# Patient Record
Sex: FEMALE | Race: WHITE | NOT HISPANIC OR LATINO | Employment: PART TIME | URBAN - METROPOLITAN AREA
[De-identification: names, ages, dates, MRNs, and addresses within clinical notes are randomized per-mention and may not be internally consistent; named-entity substitution may affect disease eponyms.]

---

## 2017-02-16 ENCOUNTER — APPOINTMENT (OUTPATIENT)
Dept: WOMENS IMAGING | Facility: HOSPITAL | Age: 57
End: 2017-02-16

## 2017-02-16 PROCEDURE — 77067 SCR MAMMO BI INCL CAD: CPT | Performed by: RADIOLOGY

## 2017-04-18 ENCOUNTER — OFFICE VISIT (OUTPATIENT)
Dept: INTERNAL MEDICINE | Facility: CLINIC | Age: 57
End: 2017-04-18

## 2017-04-18 VITALS
HEIGHT: 69 IN | BODY MASS INDEX: 25.77 KG/M2 | HEART RATE: 88 BPM | SYSTOLIC BLOOD PRESSURE: 110 MMHG | DIASTOLIC BLOOD PRESSURE: 72 MMHG | OXYGEN SATURATION: 98 % | WEIGHT: 174 LBS

## 2017-04-18 DIAGNOSIS — F90.0 ATTENTION DEFICIT HYPERACTIVITY DISORDER (ADHD), PREDOMINANTLY INATTENTIVE TYPE: Primary | ICD-10-CM

## 2017-04-18 PROCEDURE — 99213 OFFICE O/P EST LOW 20 MIN: CPT | Performed by: INTERNAL MEDICINE

## 2017-04-18 RX ORDER — METHYLPHENIDATE HYDROCHLORIDE 10 MG/1
10 TABLET ORAL 2 TIMES DAILY
Qty: 60 TABLET | Refills: 0 | Status: SHIPPED | OUTPATIENT
Start: 2017-04-18 | End: 2017-05-19 | Stop reason: SDUPTHER

## 2017-04-18 NOTE — PROGRESS NOTES
Subjective     Angely Mathias is a 56 y.o. female who presents with   Chief Complaint   Patient presents with   • ADHD       History of Present Illness     F/u of ADHD.  She states that Vyvanse is making her hungry and she is putting on weight.  She would like to stop.  She had little success with Adderall.  She is intereseted in Ritalin.  She children took that and she did well with it.     Review of Systems   Respiratory: Negative.    Cardiovascular: Negative.        The following portions of the patient's history were reviewed and updated as appropriate: allergies, current medications and problem list.    Patient Active Problem List    Diagnosis Date Noted   • Attention deficit hyperactivity disorder (ADHD), predominantly inattentive type 10/28/2016     Note Last Updated: 10/28/2016     Neuropsych testing in 10/2016 with [facility].       • Lung nodule 10/18/2016     Note Last Updated: 10/28/2016     10/2016 chest CT.  Recheck in one month.       • Cough 10/18/2016   • Atopic rhinitis 02/08/2016   • Hyperlipidemia 02/08/2016     Note Last Updated: 2/8/2016     Description: 1/2014.  10 year risk ASCVD was 1.7 %     • Chronic low back pain 02/08/2016     Note Last Updated: 2/8/2016     Description: chronic LBP.  Uses hydrocodone three times a week     • Neck pain 02/08/2016   • Persistent insomnia 02/08/2016       Current Outpatient Prescriptions on File Prior to Visit   Medication Sig Dispense Refill   • Azelastine-Fluticasone 137-50 MCG/ACT suspension into each nostril.     • Biotin 1 MG capsule Take by mouth.     • Calcium Carbonate-Vitamin D (CALCIUM + D PO) Take by mouth.     • fexofenadine (ALLEGRA) 180 MG tablet Take 180 mg by mouth daily.     • HYDROcod Polst-CPM Polst ER (TUSSIONEX PENNKINETIC ER) 10-8 MG/5ML ER suspension Take 5 mL by mouth Every 12 (Twelve) Hours As Needed for cough. 115 mL 0   • HYDROcodone-acetaminophen (NORCO)  MG per tablet Take 1 tablet by mouth every 6 (six) hours as  "needed for moderate pain (4-6). 50 tablet 0   • Ibuprofen 200 MG capsule Take by mouth nightly.     • levocetirizine (XYZAL) 5 MG tablet Take 1 tablet by mouth daily.     • mometasone-formoterol (DULERA 100) 100-5 MCG/ACT inhaler Dulera 100-5 MCG/ACT Inhalation Aerosol; Patient Sig: Dulera 100-5 MCG/ACT Inhalation Aerosol INHALE 2 PUFFS BY MOUTH TWICE DAILY; 13; 2; 24-Jul-2015; Active     • montelukast (SINGULAIR) 10 MG tablet Take 10 mg by mouth Every Night.     • Multiple Vitamin (MULTI-VITAMIN) tablet Take by mouth.     • norethindrone-ethinyl estradiol (FEMHRT 1/5) 1-5 MG-MCG tablet Take by mouth.     • omeprazole (PriLOSEC) 20 MG capsule Take 20 mg by mouth daily.     • Pseudoephedrine HCl (SUDAFED PO) Take by mouth.     • vitamin E 400 UNIT capsule Take 800 Units by mouth daily.     • [DISCONTINUED] lisdexamfetamine (VYVANSE) 50 MG capsule Take 1 capsule by mouth Every Morning. 30 capsule 0     No current facility-administered medications on file prior to visit.        Objective     /72  Pulse 88  Ht 69\" (175.3 cm)  Wt 174 lb (78.9 kg)  SpO2 98%  BMI 25.7 kg/m2    Physical Exam   Constitutional: She is oriented to person, place, and time. She appears well-developed and well-nourished.   HENT:   Head: Normocephalic and atraumatic.   Pulmonary/Chest: Effort normal.   Neurological: She is alert and oriented to person, place, and time.   Psychiatric: She has a normal mood and affect. Her behavior is normal.       Assessment/Plan   Angely was seen today for adhd.    Diagnoses and all orders for this visit:    Attention deficit hyperactivity disorder (ADHD), predominantly inattentive type    Other orders  -     methylphenidate (RITALIN) 10 MG tablet; Take 1 tablet by mouth 2 (Two) Times a Day.        Discussion  Patient presents in f/u of ADD.  She is going to stop Vyvanse.  Trial of Ritalin at a low dose. Discussed with the patient onset on action and the potential side effects including palpiations.  The " patient will let me know of any side effects from the medication.    F/u in one month.  15 minutes spent with the patient with greater than 50% of time spent counseling the following topics:, impressions           Future Appointments  Date Time Provider Department Center   5/19/2017 9:45 AM Cynthia Reid MD [facility] None

## 2017-04-30 DIAGNOSIS — IMO0001 LUNG NODULE < 6CM ON CT: Primary | ICD-10-CM

## 2017-05-17 ENCOUNTER — APPOINTMENT (OUTPATIENT)
Dept: CT IMAGING | Facility: HOSPITAL | Age: 57
End: 2017-05-17

## 2017-05-18 ENCOUNTER — HOSPITAL ENCOUNTER (OUTPATIENT)
Dept: CT IMAGING | Facility: HOSPITAL | Age: 57
Discharge: HOME | End: 2017-05-18
Admitting: INTERNAL MEDICINE

## 2017-05-18 DIAGNOSIS — IMO0001 LUNG NODULE < 6CM ON CT: ICD-10-CM

## 2017-05-18 PROCEDURE — 71250 CT THORAX DX C-: CPT

## 2017-05-19 ENCOUNTER — OFFICE VISIT (OUTPATIENT)
Dept: INTERNAL MEDICINE | Facility: CLINIC | Age: 57
End: 2017-05-19

## 2017-05-19 VITALS
OXYGEN SATURATION: 98 % | HEART RATE: 81 BPM | HEIGHT: 69 IN | BODY MASS INDEX: 25.48 KG/M2 | SYSTOLIC BLOOD PRESSURE: 102 MMHG | WEIGHT: 172 LBS | DIASTOLIC BLOOD PRESSURE: 80 MMHG

## 2017-05-19 DIAGNOSIS — F90.0 ATTENTION DEFICIT HYPERACTIVITY DISORDER (ADHD), PREDOMINANTLY INATTENTIVE TYPE: Primary | ICD-10-CM

## 2017-05-19 DIAGNOSIS — M79.644 FINGER PAIN, RIGHT: ICD-10-CM

## 2017-05-19 PROCEDURE — 99213 OFFICE O/P EST LOW 20 MIN: CPT | Performed by: INTERNAL MEDICINE

## 2017-05-19 PROCEDURE — 73130 X-RAY EXAM OF HAND: CPT | Performed by: INTERNAL MEDICINE

## 2017-05-19 RX ORDER — METHYLPHENIDATE HYDROCHLORIDE 10 MG/1
10 TABLET ORAL 2 TIMES DAILY
Qty: 60 TABLET | Refills: 0 | Status: SHIPPED | OUTPATIENT
Start: 2017-05-19 | End: 2017-06-19 | Stop reason: SDUPTHER

## 2017-06-19 RX ORDER — METHYLPHENIDATE HYDROCHLORIDE 10 MG/1
10 TABLET ORAL 2 TIMES DAILY
Qty: 60 TABLET | Refills: 0 | Status: SHIPPED | OUTPATIENT
Start: 2017-06-19 | End: 2017-07-21 | Stop reason: SDUPTHER

## 2017-07-21 RX ORDER — METHYLPHENIDATE HYDROCHLORIDE 10 MG/1
10 TABLET ORAL 2 TIMES DAILY
Qty: 60 TABLET | Refills: 0 | Status: SHIPPED | OUTPATIENT
Start: 2017-07-21 | End: 2017-08-08

## 2017-08-07 RX ORDER — HYDROCODONE BITARTRATE AND ACETAMINOPHEN 10; 325 MG/1; MG/1
1 TABLET ORAL EVERY 6 HOURS PRN
Qty: 50 TABLET | Refills: 0 | Status: SHIPPED | OUTPATIENT
Start: 2017-08-07 | End: 2018-04-23 | Stop reason: SDUPTHER

## 2017-08-08 ENCOUNTER — OFFICE VISIT (OUTPATIENT)
Dept: INTERNAL MEDICINE | Facility: CLINIC | Age: 57
End: 2017-08-08

## 2017-08-08 VITALS
DIASTOLIC BLOOD PRESSURE: 68 MMHG | SYSTOLIC BLOOD PRESSURE: 118 MMHG | BODY MASS INDEX: 25.18 KG/M2 | HEART RATE: 72 BPM | RESPIRATION RATE: 18 BRPM | HEIGHT: 69 IN | WEIGHT: 170 LBS | OXYGEN SATURATION: 100 %

## 2017-08-08 DIAGNOSIS — F90.0 ATTENTION DEFICIT HYPERACTIVITY DISORDER (ADHD), PREDOMINANTLY INATTENTIVE TYPE: ICD-10-CM

## 2017-08-08 DIAGNOSIS — M25.571 ACUTE RIGHT ANKLE PAIN: Primary | ICD-10-CM

## 2017-08-08 PROCEDURE — 99213 OFFICE O/P EST LOW 20 MIN: CPT | Performed by: INTERNAL MEDICINE

## 2017-08-08 RX ORDER — METHYLPREDNISOLONE 4 MG/1
TABLET ORAL
Qty: 21 TABLET | Refills: 0 | Status: SHIPPED | OUTPATIENT
Start: 2017-08-08 | End: 2017-09-08

## 2017-08-08 RX ORDER — DEXTROAMPHETAMINE SACCHARATE, AMPHETAMINE ASPARTATE MONOHYDRATE, DEXTROAMPHETAMINE SULFATE AND AMPHETAMINE SULFATE 5; 5; 5; 5 MG/1; MG/1; MG/1; MG/1
20 CAPSULE, EXTENDED RELEASE ORAL EVERY MORNING
Qty: 30 CAPSULE | Refills: 0 | Status: SHIPPED | OUTPATIENT
Start: 2017-08-08 | End: 2017-08-23 | Stop reason: SDUPTHER

## 2017-08-08 NOTE — PROGRESS NOTES
Subjective     Angely Mathias is a 57 y.o. female who presents with   Chief Complaint   Patient presents with   • Ankle Pain     right ankle pain, x 2 weeks, no injury       History of Present Illness     C/o ankle pain.  No specific inury.  Going on a couple weeks.  Some swelling.  Ibuprofen helpful.  Hurts to flex.      She got a different generic version with last Ritalin Rx.  She states not working as well.  She is interested in trying Adderall XR.      Review of Systems   Neurological: Negative for weakness.       The following portions of the patient's history were reviewed and updated as appropriate: allergies, current medications and problem list.    Patient Active Problem List    Diagnosis Date Noted   • Attention deficit hyperactivity disorder (ADHD), predominantly inattentive type 10/28/2016     Note Last Updated: 10/28/2016     Neuropsych testing in 10/2016 with [facility].       • Lung nodule 10/18/2016     Note Last Updated: 5/22/2017     10/2016 chest CT.  Stable 4/2017.       • Cough 10/18/2016   • Atopic rhinitis 02/08/2016   • Hyperlipidemia 02/08/2016     Note Last Updated: 2/8/2016     Description: 1/2014.  10 year risk ASCVD was 1.7 %     • Chronic low back pain 02/08/2016     Note Last Updated: 2/8/2016     Description: chronic LBP.  Uses hydrocodone three times a week     • Neck pain 02/08/2016   • Persistent insomnia 02/08/2016       Current Outpatient Prescriptions on File Prior to Visit   Medication Sig Dispense Refill   • Azelastine-Fluticasone 137-50 MCG/ACT suspension into each nostril.     • Biotin 1 MG capsule Take by mouth.     • Calcium Carbonate-Vitamin D (CALCIUM + D PO) Take by mouth.     • diclofenac (VOLTAREN) 1 % gel gel Apply 2 g topically 4 (Four) Times a Day As Needed (swelling). 100 g 0   • fexofenadine (ALLEGRA) 180 MG tablet Take 180 mg by mouth daily.     • HYDROcod Polst-CPM Polst ER (TUSSIONEX PENNKINETIC ER) 10-8 MG/5ML ER suspension Take 5 mL by mouth Every  "12 (Twelve) Hours As Needed for Cough. 115 mL 0   • HYDROcodone-acetaminophen (NORCO)  MG per tablet Take 1 tablet by mouth Every 6 (Six) Hours As Needed for Moderate Pain (4-6). 50 tablet 0   • Ibuprofen 200 MG capsule Take by mouth nightly.     • levocetirizine (XYZAL) 5 MG tablet Take 1 tablet by mouth daily.     • mometasone-formoterol (DULERA 100) 100-5 MCG/ACT inhaler Dulera 100-5 MCG/ACT Inhalation Aerosol; Patient Sig: Dulera 100-5 MCG/ACT Inhalation Aerosol INHALE 2 PUFFS BY MOUTH TWICE DAILY; 13; 2; 24-Jul-2015; Active     • montelukast (SINGULAIR) 10 MG tablet Take 10 mg by mouth Every Night.     • Multiple Vitamin (MULTI-VITAMIN) tablet Take by mouth.     • norethindrone-ethinyl estradiol (FEMHRT 1/5) 1-5 MG-MCG tablet Take by mouth.     • omeprazole (PriLOSEC) 20 MG capsule Take 20 mg by mouth daily.     • Pseudoephedrine HCl (SUDAFED PO) Take by mouth.     • vitamin E 400 UNIT capsule Take 800 Units by mouth daily.     • [DISCONTINUED] methylphenidate (RITALIN) 10 MG tablet Take 1 tablet by mouth 2 (Two) Times a Day. 60 tablet 0     No current facility-administered medications on file prior to visit.        Objective     /68  Pulse 72  Resp 18  Ht 69\" (175.3 cm)  Wt 170 lb (77.1 kg)  SpO2 100%  BMI 25.1 kg/m2    Physical Exam   Constitutional: She is oriented to person, place, and time. She appears well-developed and well-nourished.   HENT:   Head: Normocephalic and atraumatic.   Pulmonary/Chest: Effort normal.   Musculoskeletal:        Right ankle: She exhibits swelling. She exhibits normal range of motion, no ecchymosis and no deformity. No tenderness.   Neurological: She is alert and oriented to person, place, and time.   Psychiatric: She has a normal mood and affect. Her behavior is normal.     X-rays of the right ankle  performed today for following indication:   Pain .  X-ray reveal nad.  There is no available x-ray for comparison.  X-ray sent to radiology for official " interpretation and findings.        Assessment/Plan   Angely was seen today for ankle pain.    Diagnoses and all orders for this visit:    Acute right ankle pain  -     XR Ankle 3+ View Right    Attention deficit hyperactivity disorder (ADHD), predominantly inattentive type    Other orders  -     amphetamine-dextroamphetamine XR (ADDERALL XR) 20 MG 24 hr capsule; Take 1 capsule by mouth Every Morning.  -     MethylPREDNISolone (MEDROL, BOYD,) 4 MG tablet; Take as directed on package instructions.        Discussion    Patient presents with right ankle pain.  Trial of conservative management with a steroid boyd.    Let me know if not feeling better over the next several weeks or if there is any change in symptoms.  ADD.  Trial of change to Adderall XR.  F/u as planned next month.           Future Appointments  Date Time Provider Department Center   9/1/2017 10:40 AM [facility] None   9/8/2017 3:30 PM MD [facility] None

## 2017-08-23 RX ORDER — DEXTROAMPHETAMINE SACCHARATE, AMPHETAMINE ASPARTATE MONOHYDRATE, DEXTROAMPHETAMINE SULFATE AND AMPHETAMINE SULFATE 7.5; 7.5; 7.5; 7.5 MG/1; MG/1; MG/1; MG/1
30 CAPSULE, EXTENDED RELEASE ORAL EVERY MORNING
Qty: 14 CAPSULE | Refills: 0 | Status: SHIPPED | OUTPATIENT
Start: 2017-08-23 | End: 2017-09-08 | Stop reason: SDUPTHER

## 2017-08-31 DIAGNOSIS — Z00.00 HEALTH MAINTENANCE EXAMINATION: Primary | ICD-10-CM

## 2017-09-01 ENCOUNTER — RESULTS ENCOUNTER (OUTPATIENT)
Dept: INTERNAL MEDICINE | Facility: CLINIC | Age: 57
End: 2017-09-01

## 2017-09-01 DIAGNOSIS — Z00.00 HEALTH MAINTENANCE EXAMINATION: ICD-10-CM

## 2017-09-03 LAB
25(OH)D3+25(OH)D2 SERPL-MCNC: 52.7 NG/ML (ref 30–100)
ALBUMIN SERPL-MCNC: 4.7 G/DL (ref 3.5–5.2)
ALBUMIN/GLOB SERPL: 2 G/DL
ALP SERPL-CCNC: 39 U/L (ref 39–117)
ALT SERPL-CCNC: 25 U/L (ref 1–33)
APPEARANCE UR: CLEAR
AST SERPL-CCNC: 27 U/L (ref 1–32)
BACTERIA #/AREA URNS HPF: NORMAL /HPF
BACTERIA UR CULT: NO GROWTH
BACTERIA UR CULT: NORMAL
BASOPHILS # BLD AUTO: 0.02 10*3/MM3 (ref 0–0.2)
BASOPHILS NFR BLD AUTO: 0.4 % (ref 0–1.5)
BILIRUB SERPL-MCNC: 0.3 MG/DL (ref 0.1–1.2)
BILIRUB UR QL STRIP: NEGATIVE
BUN SERPL-MCNC: 19 MG/DL (ref 6–20)
BUN/CREAT SERPL: 22.1 (ref 7–25)
CALCIUM SERPL-MCNC: 10.3 MG/DL (ref 8.6–10.5)
CHLORIDE SERPL-SCNC: 104 MMOL/L (ref 98–107)
CHOLEST SERPL-MCNC: 206 MG/DL (ref 0–200)
CO2 SERPL-SCNC: 29 MMOL/L (ref 22–29)
COLOR UR: YELLOW
CREAT SERPL-MCNC: 0.86 MG/DL (ref 0.57–1)
EOSINOPHIL # BLD AUTO: 0.14 10*3/MM3 (ref 0–0.7)
EOSINOPHIL NFR BLD AUTO: 3 % (ref 0.3–6.2)
EPI CELLS #/AREA URNS HPF: NORMAL /HPF
ERYTHROCYTE [DISTWIDTH] IN BLOOD BY AUTOMATED COUNT: 12 % (ref 11.7–13)
GLOBULIN SER CALC-MCNC: 2.3 GM/DL
GLUCOSE SERPL-MCNC: 90 MG/DL (ref 65–99)
GLUCOSE UR QL: NEGATIVE
HCT VFR BLD AUTO: 42.7 % (ref 35.6–45.5)
HDLC SERPL-MCNC: 70 MG/DL (ref 40–60)
HGB BLD-MCNC: 13.9 G/DL (ref 11.9–15.5)
HGB UR QL STRIP: ABNORMAL
IMM GRANULOCYTES # BLD: 0 10*3/MM3 (ref 0–0.03)
IMM GRANULOCYTES NFR BLD: 0 % (ref 0–0.5)
KETONES UR QL STRIP: NEGATIVE
LDLC SERPL CALC-MCNC: 121 MG/DL (ref 0–100)
LEUKOCYTE ESTERASE UR QL STRIP: ABNORMAL
LYMPHOCYTES # BLD AUTO: 1.35 10*3/MM3 (ref 0.9–4.8)
LYMPHOCYTES NFR BLD AUTO: 29.2 % (ref 19.6–45.3)
MCH RBC QN AUTO: 32.3 PG (ref 26.9–32)
MCHC RBC AUTO-ENTMCNC: 32.6 G/DL (ref 32.4–36.3)
MCV RBC AUTO: 99.1 FL (ref 80.5–98.2)
MICRO URNS: ABNORMAL
MONOCYTES # BLD AUTO: 0.38 10*3/MM3 (ref 0.2–1.2)
MONOCYTES NFR BLD AUTO: 8.2 % (ref 5–12)
MUCOUS THREADS URNS QL MICRO: PRESENT /HPF
NEUTROPHILS # BLD AUTO: 2.74 10*3/MM3 (ref 1.9–8.1)
NEUTROPHILS NFR BLD AUTO: 59.2 % (ref 42.7–76)
NITRITE UR QL STRIP: NEGATIVE
PH UR STRIP: 6 [PH] (ref 5–7.5)
PLATELET # BLD AUTO: 264 10*3/MM3 (ref 140–500)
POTASSIUM SERPL-SCNC: 4.7 MMOL/L (ref 3.5–5.2)
PROT SERPL-MCNC: 7 G/DL (ref 6–8.5)
PROT UR QL STRIP: NEGATIVE
RBC # BLD AUTO: 4.31 10*6/MM3 (ref 3.9–5.2)
RBC #/AREA URNS HPF: NORMAL /HPF
SODIUM SERPL-SCNC: 144 MMOL/L (ref 136–145)
SP GR UR: 1.01 (ref 1–1.03)
TRIGL SERPL-MCNC: 77 MG/DL (ref 0–150)
TSH SERPL DL<=0.005 MIU/L-ACNC: 1.25 MIU/ML (ref 0.27–4.2)
URINALYSIS REFLEX: ABNORMAL
UROBILINOGEN UR STRIP-MCNC: 0.2 MG/DL (ref 0.2–1)
VLDLC SERPL CALC-MCNC: 15.4 MG/DL (ref 5–40)
WBC # BLD AUTO: 4.63 10*3/MM3 (ref 4.5–10.7)
WBC #/AREA URNS HPF: NORMAL /HPF

## 2017-09-08 ENCOUNTER — OFFICE VISIT (OUTPATIENT)
Dept: INTERNAL MEDICINE | Facility: CLINIC | Age: 57
End: 2017-09-08

## 2017-09-08 VITALS
OXYGEN SATURATION: 98 % | BODY MASS INDEX: 24.73 KG/M2 | HEIGHT: 69 IN | SYSTOLIC BLOOD PRESSURE: 122 MMHG | HEART RATE: 74 BPM | WEIGHT: 167 LBS | DIASTOLIC BLOOD PRESSURE: 72 MMHG

## 2017-09-08 DIAGNOSIS — Z00.00 WELL ADULT EXAM: Primary | ICD-10-CM

## 2017-09-08 PROCEDURE — 99396 PREV VISIT EST AGE 40-64: CPT | Performed by: INTERNAL MEDICINE

## 2017-09-08 PROCEDURE — 93000 ELECTROCARDIOGRAM COMPLETE: CPT | Performed by: INTERNAL MEDICINE

## 2017-09-08 RX ORDER — DEXTROAMPHETAMINE SACCHARATE, AMPHETAMINE ASPARTATE MONOHYDRATE, DEXTROAMPHETAMINE SULFATE AND AMPHETAMINE SULFATE 7.5; 7.5; 7.5; 7.5 MG/1; MG/1; MG/1; MG/1
30 CAPSULE, EXTENDED RELEASE ORAL EVERY MORNING
Qty: 30 CAPSULE | Refills: 0 | Status: SHIPPED | OUTPATIENT
Start: 2017-09-08 | End: 2017-10-09 | Stop reason: SDUPTHER

## 2017-09-08 NOTE — PROGRESS NOTES
Subjective     Angely Mathias is a 57 y.o. female who presents for a complete physical exam.      History of Present Illness     ADD.  Good with Adderall XR 30.  Chronic LBP.  Prn Norco is effective for the pain.     Review of Systems   Constitutional: Negative.    HENT: Negative.    Eyes: Negative.    Respiratory: Negative.    Cardiovascular: Negative.    Gastrointestinal: Negative.    Endocrine: Negative.    Genitourinary: Negative.    Musculoskeletal: Positive for back pain.   Skin: Negative.    Allergic/Immunologic: Negative.    Neurological: Negative.    Hematological: Negative.    Psychiatric/Behavioral: Negative.        The following portions of the patient's history were reviewed and updated as appropriate: allergies, current medications, past family history, past medical history, past social history, past surgical history and problem list.  Health maintenance tab was reviewed and updated with the patient.       Patient Active Problem List    Diagnosis Date Noted   • Attention deficit hyperactivity disorder (ADHD), predominantly inattentive type 10/28/2016     Note Last Updated: 10/28/2016     Neuropsych testing in 10/2016 with [facility].       • Lung nodule 10/18/2016     Note Last Updated: 5/22/2017     10/2016 chest CT.  Stable 4/2017.       • Cough 10/18/2016   • Atopic rhinitis 02/08/2016   • Hyperlipidemia 02/08/2016     Note Last Updated: 2/8/2016     Description: 1/2014.  10 year risk ASCVD was 1.7 %     • Chronic low back pain 02/08/2016     Note Last Updated: 2/8/2016     Description: chronic LBP.  Uses hydrocodone three times a week     • Neck pain 02/08/2016   • Persistent insomnia 02/08/2016       Past Medical History:   Diagnosis Date   • Acute bronchitis    • Acute pain    • Blood tests for routine general physical examination    • Diarrhea    • Microscopic hematuria        Past Surgical History:   Procedure Laterality Date   • ABDOMINOPLASTY     • BREAST SURGERY      Enlargement  Procedure   • CHOLECYSTECTOMY     • DIAGNOSTIC LAPAROSCOPY EXPLORATORY LAPAROTOMY     • DILATATION AND CURETTAGE     • OTHER SURGICAL HISTORY      Vaginal Sling Operation for Stress Incontinence   • WRIST SURGERY         Family History   Problem Relation Age of Onset   • Breast cancer Sister    • Bipolar disorder Brother        Social History     Social History   • Marital status:      Spouse name: N/A   • Number of children: N/A   • Years of education: N/A     Occupational History   • Not on file.     Social History Main Topics   • Smoking status: Never Smoker   • Smokeless tobacco: Not on file   • Alcohol use Yes   • Drug use: Not on file   • Sexual activity: Not on file     Other Topics Concern   • Not on file     Social History Narrative       Current Outpatient Prescriptions on File Prior to Visit   Medication Sig Dispense Refill   • Azelastine-Fluticasone 137-50 MCG/ACT suspension into each nostril.     • Biotin 1 MG capsule Take by mouth.     • Calcium Carbonate-Vitamin D (CALCIUM + D PO) Take by mouth.     • diclofenac (VOLTAREN) 1 % gel gel Apply 2 g topically 4 (Four) Times a Day As Needed (swelling). 100 g 0   • fexofenadine (ALLEGRA) 180 MG tablet Take 180 mg by mouth daily.     • HYDROcod Polst-CPM Polst ER (TUSSIONEX PENNKINETIC ER) 10-8 MG/5ML ER suspension Take 5 mL by mouth Every 12 (Twelve) Hours As Needed for Cough. 115 mL 0   • HYDROcodone-acetaminophen (NORCO)  MG per tablet Take 1 tablet by mouth Every 6 (Six) Hours As Needed for Moderate Pain (4-6). 50 tablet 0   • Ibuprofen 200 MG capsule Take by mouth nightly.     • levocetirizine (XYZAL) 5 MG tablet Take 1 tablet by mouth daily.     • mometasone-formoterol (DULERA 100) 100-5 MCG/ACT inhaler Dulera 100-5 MCG/ACT Inhalation Aerosol; Patient Sig: Dulera 100-5 MCG/ACT Inhalation Aerosol INHALE 2 PUFFS BY MOUTH TWICE DAILY; 13; 2; 24-Jul-2015; Active     • Multiple Vitamin (MULTI-VITAMIN) tablet Take by mouth.     •  "norethindrone-ethinyl estradiol (FEMHRT 1/5) 1-5 MG-MCG tablet Take by mouth.     • omeprazole (PriLOSEC) 20 MG capsule Take 20 mg by mouth daily.     • Pseudoephedrine HCl (SUDAFED PO) Take by mouth.     • vitamin E 400 UNIT capsule Take 800 Units by mouth daily.     • [DISCONTINUED] amphetamine-dextroamphetamine XR (ADDERALL XR) 30 MG 24 hr capsule Take 1 capsule by mouth Every Morning. 14 capsule 0   • [DISCONTINUED] MethylPREDNISolone (MEDROL, BOYD,) 4 MG tablet Take as directed on package instructions. 21 tablet 0   • [DISCONTINUED] montelukast (SINGULAIR) 10 MG tablet Take 10 mg by mouth Every Night.       No current facility-administered medications on file prior to visit.        Allergies   Allergen Reactions   • Contrast Dye Shortness Of Breath   • Pistachio Nut (Diagnostic) Anaphylaxis   • Codeine Itching   • Morphine And Related Itching and Rash   • Sulfa Antibiotics Itching and Rash       Immunization History   Administered Date(s) Administered   • Tdap 01/24/2014       Objective     /72  Pulse 74  Ht 69\" (175.3 cm)  Wt 167 lb (75.8 kg)  SpO2 98%  BMI 24.66 kg/m2    Physical Exam   Constitutional: She is oriented to person, place, and time. She appears well-developed and well-nourished.   HENT:   Head: Normocephalic and atraumatic.   Right Ear: Hearing, tympanic membrane and external ear normal.   Left Ear: Hearing, tympanic membrane and external ear normal.   Nose: Nose normal.   Mouth/Throat: Oropharynx is clear and moist.   Neck: Neck supple. No thyromegaly present.   Cardiovascular: Normal rate, regular rhythm and normal heart sounds.    No murmur heard.  Pulmonary/Chest: Effort normal and breath sounds normal. Right breast exhibits no mass. Left breast exhibits no mass.   Abdominal: Soft. She exhibits no distension. There is no hepatosplenomegaly. There is no tenderness.   Genitourinary: No breast tenderness.   Lymphadenopathy:     She has no cervical adenopathy.   Neurological: She is " alert and oriented to person, place, and time.   Skin: Skin is warm and dry.   Psychiatric: She has a normal mood and affect. Her speech is normal and behavior is normal. Judgment and thought content normal. Cognition and memory are normal.         ECG 12 Lead  Date/Time: 9/8/2017 4:11 PM  Performed by: BETHEL KEY  Authorized by: BETHEL KEY   Comparison: compared with previous ECG from 8/22/2016  Similar to previous ECG  Rhythm: sinus rhythm  Rate: normal  Conduction: conduction normal  ST Segments: ST segments normal  T Waves: T waves normal  QRS axis: normal  Clinical impression: normal ECG              Assessment/Plan   Angely was seen today for annual exam.    Diagnoses and all orders for this visit:    Well adult exam  -     ECG 12 Lead    Other orders  -     amphetamine-dextroamphetamine XR (ADDERALL XR) 30 MG 24 hr capsule; Take 1 capsule by mouth Every Morning.        Discussion    Patient presents today for a CPE.      Patient follows a healthy diet.   Patient follows an adequate exercise regimen. Mammogram is up to date.   Colon cancer screening is up to date.   Pap smears are performed by the patient's gynecologist.   Immunizations are up to date.   DEXA is up to date.    Add.  Continue current regimen.  Chronic LBP.  Update contract for prn [place].      Health Maintenance   Topic Date Due   • HEPATITIS C SCREENING  02/08/2016   • INFLUENZA VACCINE  08/01/2017   • LIPID PANEL  09/01/2018   • MAMMOGRAM  08/01/2019   • PAP SMEAR  08/01/2020   • COLONOSCOPY  08/03/2022   • TDAP/TD VACCINES (2 - Td) 01/24/2024            No future appointments.

## 2017-10-09 ENCOUNTER — TELEPHONE (OUTPATIENT)
Dept: INTERNAL MEDICINE | Facility: CLINIC | Age: 57
End: 2017-10-09

## 2017-10-09 RX ORDER — DEXTROAMPHETAMINE SACCHARATE, AMPHETAMINE ASPARTATE MONOHYDRATE, DEXTROAMPHETAMINE SULFATE AND AMPHETAMINE SULFATE 7.5; 7.5; 7.5; 7.5 MG/1; MG/1; MG/1; MG/1
30 CAPSULE, EXTENDED RELEASE ORAL EVERY MORNING
Qty: 30 CAPSULE | Refills: 0 | Status: SHIPPED | OUTPATIENT
Start: 2017-10-09 | End: 2017-11-17 | Stop reason: SDUPTHER

## 2017-11-16 ENCOUNTER — TELEPHONE (OUTPATIENT)
Dept: INTERNAL MEDICINE | Facility: CLINIC | Age: 57
End: 2017-11-16

## 2017-11-16 NOTE — TELEPHONE ENCOUNTER
Patient called states she needs a refill on her Adderall XR  30 mg. Oniel is running now and agreement is UTD. RJ  She would like a call when it is ready for .     Printed.  MALLORY

## 2017-11-17 RX ORDER — DEXTROAMPHETAMINE SACCHARATE, AMPHETAMINE ASPARTATE MONOHYDRATE, DEXTROAMPHETAMINE SULFATE AND AMPHETAMINE SULFATE 7.5; 7.5; 7.5; 7.5 MG/1; MG/1; MG/1; MG/1
30 CAPSULE, EXTENDED RELEASE ORAL EVERY MORNING
Qty: 30 CAPSULE | Refills: 0 | Status: SHIPPED | OUTPATIENT
Start: 2017-11-17 | End: 2018-01-02 | Stop reason: SDUPTHER

## 2017-11-29 DIAGNOSIS — R91.1 LUNG NODULE: Primary | ICD-10-CM

## 2018-01-02 RX ORDER — DEXTROAMPHETAMINE SACCHARATE, AMPHETAMINE ASPARTATE MONOHYDRATE, DEXTROAMPHETAMINE SULFATE AND AMPHETAMINE SULFATE 7.5; 7.5; 7.5; 7.5 MG/1; MG/1; MG/1; MG/1
30 CAPSULE, EXTENDED RELEASE ORAL EVERY MORNING
Qty: 30 CAPSULE | Refills: 0 | Status: SHIPPED | OUTPATIENT
Start: 2018-01-02 | End: 2018-02-02 | Stop reason: SDUPTHER

## 2018-02-02 ENCOUNTER — TELEPHONE (OUTPATIENT)
Dept: INTERNAL MEDICINE | Facility: CLINIC | Age: 58
End: 2018-02-02

## 2018-02-02 RX ORDER — DEXTROAMPHETAMINE SACCHARATE, AMPHETAMINE ASPARTATE MONOHYDRATE, DEXTROAMPHETAMINE SULFATE AND AMPHETAMINE SULFATE 7.5; 7.5; 7.5; 7.5 MG/1; MG/1; MG/1; MG/1
30 CAPSULE, EXTENDED RELEASE ORAL EVERY MORNING
Qty: 30 CAPSULE | Refills: 0 | Status: SHIPPED | OUTPATIENT
Start: 2018-02-02 | End: 2018-03-13 | Stop reason: SDUPTHER

## 2018-03-13 RX ORDER — DEXTROAMPHETAMINE SACCHARATE, AMPHETAMINE ASPARTATE MONOHYDRATE, DEXTROAMPHETAMINE SULFATE AND AMPHETAMINE SULFATE 7.5; 7.5; 7.5; 7.5 MG/1; MG/1; MG/1; MG/1
30 CAPSULE, EXTENDED RELEASE ORAL EVERY MORNING
Qty: 30 CAPSULE | Refills: 0 | Status: SHIPPED | OUTPATIENT
Start: 2018-03-13 | End: 2018-04-23 | Stop reason: SDUPTHER

## 2018-04-23 RX ORDER — HYDROCODONE BITARTRATE AND ACETAMINOPHEN 10; 325 MG/1; MG/1
1 TABLET ORAL EVERY 6 HOURS PRN
Qty: 50 TABLET | Refills: 0 | Status: SHIPPED | OUTPATIENT
Start: 2018-04-23 | End: 2019-02-07 | Stop reason: SDUPTHER

## 2018-04-23 RX ORDER — DEXTROAMPHETAMINE SACCHARATE, AMPHETAMINE ASPARTATE MONOHYDRATE, DEXTROAMPHETAMINE SULFATE AND AMPHETAMINE SULFATE 7.5; 7.5; 7.5; 7.5 MG/1; MG/1; MG/1; MG/1
30 CAPSULE, EXTENDED RELEASE ORAL EVERY MORNING
Qty: 30 CAPSULE | Refills: 0 | Status: SHIPPED | OUTPATIENT
Start: 2018-04-23 | End: 2018-05-25 | Stop reason: SDUPTHER

## 2018-05-25 RX ORDER — DEXTROAMPHETAMINE SACCHARATE, AMPHETAMINE ASPARTATE MONOHYDRATE, DEXTROAMPHETAMINE SULFATE AND AMPHETAMINE SULFATE 7.5; 7.5; 7.5; 7.5 MG/1; MG/1; MG/1; MG/1
30 CAPSULE, EXTENDED RELEASE ORAL EVERY MORNING
Qty: 30 CAPSULE | Refills: 0 | Status: SHIPPED | OUTPATIENT
Start: 2018-05-25 | End: 2018-06-28 | Stop reason: SDUPTHER

## 2018-06-28 RX ORDER — DEXTROAMPHETAMINE SACCHARATE, AMPHETAMINE ASPARTATE MONOHYDRATE, DEXTROAMPHETAMINE SULFATE AND AMPHETAMINE SULFATE 7.5; 7.5; 7.5; 7.5 MG/1; MG/1; MG/1; MG/1
30 CAPSULE, EXTENDED RELEASE ORAL EVERY MORNING
Qty: 30 CAPSULE | Refills: 0 | Status: SHIPPED | OUTPATIENT
Start: 2018-06-28 | End: 2018-06-28 | Stop reason: SDUPTHER

## 2018-06-28 RX ORDER — DEXTROAMPHETAMINE SACCHARATE, AMPHETAMINE ASPARTATE MONOHYDRATE, DEXTROAMPHETAMINE SULFATE AND AMPHETAMINE SULFATE 7.5; 7.5; 7.5; 7.5 MG/1; MG/1; MG/1; MG/1
30 CAPSULE, EXTENDED RELEASE ORAL EVERY MORNING
Qty: 30 CAPSULE | Refills: 0 | Status: SHIPPED | OUTPATIENT
Start: 2018-06-28 | End: 2018-07-25 | Stop reason: SDUPTHER

## 2018-07-25 RX ORDER — DEXTROAMPHETAMINE SACCHARATE, AMPHETAMINE ASPARTATE MONOHYDRATE, DEXTROAMPHETAMINE SULFATE AND AMPHETAMINE SULFATE 7.5; 7.5; 7.5; 7.5 MG/1; MG/1; MG/1; MG/1
30 CAPSULE, EXTENDED RELEASE ORAL EVERY MORNING
Qty: 30 CAPSULE | Refills: 0 | Status: SHIPPED | OUTPATIENT
Start: 2018-07-25 | End: 2018-08-31 | Stop reason: SDUPTHER

## 2018-08-31 RX ORDER — DEXTROAMPHETAMINE SACCHARATE, AMPHETAMINE ASPARTATE MONOHYDRATE, DEXTROAMPHETAMINE SULFATE AND AMPHETAMINE SULFATE 7.5; 7.5; 7.5; 7.5 MG/1; MG/1; MG/1; MG/1
30 CAPSULE, EXTENDED RELEASE ORAL EVERY MORNING
Qty: 30 CAPSULE | Refills: 0 | Status: SHIPPED | OUTPATIENT
Start: 2018-08-31 | End: 2018-09-28 | Stop reason: SDUPTHER

## 2018-09-28 ENCOUNTER — TELEPHONE (OUTPATIENT)
Dept: INTERNAL MEDICINE | Facility: CLINIC | Age: 58
End: 2018-09-28

## 2018-09-28 RX ORDER — DEXTROAMPHETAMINE SACCHARATE, AMPHETAMINE ASPARTATE MONOHYDRATE, DEXTROAMPHETAMINE SULFATE AND AMPHETAMINE SULFATE 7.5; 7.5; 7.5; 7.5 MG/1; MG/1; MG/1; MG/1
30 CAPSULE, EXTENDED RELEASE ORAL EVERY MORNING
Qty: 30 CAPSULE | Refills: 0 | Status: SHIPPED | OUTPATIENT
Start: 2018-09-28 | End: 2018-11-02 | Stop reason: SDUPTHER

## 2018-09-28 NOTE — TELEPHONE ENCOUNTER
Last ov 9/8/2017    rx written 8/31/18  yenny 9/9/18    Refill request for adderall.  TW    Advise patient I sent in.  She needs to schedule a CPE. MALLORY

## 2018-10-01 ENCOUNTER — APPOINTMENT (OUTPATIENT)
Dept: WOMENS IMAGING | Facility: HOSPITAL | Age: 58
End: 2018-10-01

## 2018-10-01 PROCEDURE — 77063 BREAST TOMOSYNTHESIS BI: CPT | Performed by: RADIOLOGY

## 2018-10-01 PROCEDURE — 77067 SCR MAMMO BI INCL CAD: CPT | Performed by: RADIOLOGY

## 2018-10-11 ENCOUNTER — TELEPHONE (OUTPATIENT)
Dept: INTERNAL MEDICINE | Facility: CLINIC | Age: 58
End: 2018-10-11

## 2018-10-11 DIAGNOSIS — R91.1 LUNG NODULE: Primary | ICD-10-CM

## 2018-10-11 NOTE — TELEPHONE ENCOUNTER
Pt LM saying that a year ago you ordered her to have a CT (chest)  She did not get it done and is asking do you want her to have it done now?  If so she needs a new order.  KS    Advise patient Orders are placed.  SLW

## 2018-10-16 ENCOUNTER — APPOINTMENT (OUTPATIENT)
Dept: CT IMAGING | Facility: HOSPITAL | Age: 58
End: 2018-10-16

## 2018-10-26 ENCOUNTER — HOSPITAL ENCOUNTER (OUTPATIENT)
Dept: CT IMAGING | Facility: HOSPITAL | Age: 58
Discharge: HOME | End: 2018-10-26
Admitting: INTERNAL MEDICINE

## 2018-10-26 DIAGNOSIS — R91.1 LUNG NODULE: ICD-10-CM

## 2018-10-26 PROCEDURE — 71250 CT THORAX DX C-: CPT

## 2018-11-02 RX ORDER — DEXTROAMPHETAMINE SACCHARATE, AMPHETAMINE ASPARTATE MONOHYDRATE, DEXTROAMPHETAMINE SULFATE AND AMPHETAMINE SULFATE 7.5; 7.5; 7.5; 7.5 MG/1; MG/1; MG/1; MG/1
30 CAPSULE, EXTENDED RELEASE ORAL EVERY MORNING
Qty: 30 CAPSULE | Refills: 0 | Status: SHIPPED | OUTPATIENT
Start: 2018-11-02 | End: 2018-11-28

## 2018-11-12 DIAGNOSIS — Z00.00 HEALTH CARE MAINTENANCE: Primary | ICD-10-CM

## 2018-11-17 LAB
25(OH)D3+25(OH)D2 SERPL-MCNC: 35.8 NG/ML (ref 30–100)
ALBUMIN SERPL-MCNC: 4.5 G/DL (ref 3.5–5.2)
ALBUMIN/GLOB SERPL: 1.8 G/DL
ALP SERPL-CCNC: 53 U/L (ref 39–117)
ALT SERPL-CCNC: 27 U/L (ref 1–33)
APPEARANCE UR: CLEAR
AST SERPL-CCNC: 22 U/L (ref 1–32)
BACTERIA #/AREA URNS HPF: NORMAL /HPF
BASOPHILS # BLD AUTO: 0.06 10*3/MM3 (ref 0–0.2)
BASOPHILS NFR BLD AUTO: 1.1 % (ref 0–1.5)
BILIRUB SERPL-MCNC: 0.4 MG/DL (ref 0.1–1.2)
BILIRUB UR QL STRIP: NEGATIVE
BUN SERPL-MCNC: 18 MG/DL (ref 8–23)
BUN/CREAT SERPL: 17 (ref 7–25)
CALCIUM SERPL-MCNC: 10 MG/DL (ref 8.6–10.5)
CHLORIDE SERPL-SCNC: 103 MMOL/L (ref 98–107)
CHOLEST SERPL-MCNC: 247 MG/DL (ref 0–200)
CO2 SERPL-SCNC: 25.6 MMOL/L (ref 22–29)
COLOR UR: YELLOW
CREAT SERPL-MCNC: 1.06 MG/DL (ref 0.57–1)
EOSINOPHIL # BLD AUTO: 0.19 10*3/MM3 (ref 0–0.7)
EOSINOPHIL NFR BLD AUTO: 3.5 % (ref 0.3–6.2)
EPI CELLS #/AREA URNS HPF: NORMAL /HPF
ERYTHROCYTE [DISTWIDTH] IN BLOOD BY AUTOMATED COUNT: 13 % (ref 11.7–13)
GLOBULIN SER CALC-MCNC: 2.5 GM/DL
GLUCOSE SERPL-MCNC: 93 MG/DL (ref 65–99)
GLUCOSE UR QL: NEGATIVE
HCT VFR BLD AUTO: 43.9 % (ref 35.6–45.5)
HDLC SERPL-MCNC: 86 MG/DL (ref 40–60)
HGB BLD-MCNC: 13.9 G/DL (ref 11.9–15.5)
HGB UR QL STRIP: NEGATIVE
IMM GRANULOCYTES # BLD: 0 10*3/MM3 (ref 0–0.03)
IMM GRANULOCYTES NFR BLD: 0 % (ref 0–0.5)
KETONES UR QL STRIP: NEGATIVE
LDLC SERPL CALC-MCNC: 148 MG/DL (ref 0–100)
LEUKOCYTE ESTERASE UR QL STRIP: NEGATIVE
LYMPHOCYTES # BLD AUTO: 2.14 10*3/MM3 (ref 0.9–4.8)
LYMPHOCYTES NFR BLD AUTO: 39.1 % (ref 19.6–45.3)
MCH RBC QN AUTO: 31.1 PG (ref 26.9–32)
MCHC RBC AUTO-ENTMCNC: 31.7 G/DL (ref 32.4–36.3)
MCV RBC AUTO: 98.2 FL (ref 80.5–98.2)
MICRO URNS: NORMAL
MICRO URNS: NORMAL
MONOCYTES # BLD AUTO: 0.47 10*3/MM3 (ref 0.2–1.2)
MONOCYTES NFR BLD AUTO: 8.6 % (ref 5–12)
MUCOUS THREADS URNS QL MICRO: PRESENT /HPF
NEUTROPHILS # BLD AUTO: 2.61 10*3/MM3 (ref 1.9–8.1)
NEUTROPHILS NFR BLD AUTO: 47.7 % (ref 42.7–76)
NITRITE UR QL STRIP: NEGATIVE
PH UR STRIP: 5.5 [PH] (ref 5–7.5)
PLATELET # BLD AUTO: 293 10*3/MM3 (ref 140–500)
POTASSIUM SERPL-SCNC: 5.1 MMOL/L (ref 3.5–5.2)
PROT SERPL-MCNC: 7 G/DL (ref 6–8.5)
PROT UR QL STRIP: NEGATIVE
RBC # BLD AUTO: 4.47 10*6/MM3 (ref 3.9–5.2)
RBC #/AREA URNS HPF: NORMAL /HPF
SODIUM SERPL-SCNC: 140 MMOL/L (ref 136–145)
SP GR UR: 1.02 (ref 1–1.03)
TRIGL SERPL-MCNC: 67 MG/DL (ref 0–150)
TSH SERPL DL<=0.005 MIU/L-ACNC: 1.87 MIU/ML (ref 0.27–4.2)
URINALYSIS REFLEX: NORMAL
UROBILINOGEN UR STRIP-MCNC: 0.2 MG/DL (ref 0.2–1)
VLDLC SERPL CALC-MCNC: 13.4 MG/DL (ref 5–40)
WBC # BLD AUTO: 5.47 10*3/MM3 (ref 4.5–10.7)
WBC #/AREA URNS HPF: NORMAL /HPF

## 2018-11-28 ENCOUNTER — OFFICE VISIT (OUTPATIENT)
Dept: INTERNAL MEDICINE | Facility: CLINIC | Age: 58
End: 2018-11-28

## 2018-11-28 DIAGNOSIS — J42 CHRONIC BRONCHITIS, UNSPECIFIED CHRONIC BRONCHITIS TYPE (HCC): ICD-10-CM

## 2018-11-28 DIAGNOSIS — F90.0 ATTENTION DEFICIT HYPERACTIVITY DISORDER (ADHD), PREDOMINANTLY INATTENTIVE TYPE: ICD-10-CM

## 2018-11-28 DIAGNOSIS — G89.29 CHRONIC BILATERAL LOW BACK PAIN WITH SCIATICA, SCIATICA LATERALITY UNSPECIFIED: ICD-10-CM

## 2018-11-28 DIAGNOSIS — Z11.59 NEED FOR HEPATITIS C SCREENING TEST: ICD-10-CM

## 2018-11-28 DIAGNOSIS — E78.5 HYPERLIPIDEMIA, UNSPECIFIED HYPERLIPIDEMIA TYPE: ICD-10-CM

## 2018-11-28 DIAGNOSIS — M54.40 CHRONIC BILATERAL LOW BACK PAIN WITH SCIATICA, SCIATICA LATERALITY UNSPECIFIED: ICD-10-CM

## 2018-11-28 DIAGNOSIS — Z00.00 WELL ADULT EXAM: Primary | ICD-10-CM

## 2018-11-28 PROCEDURE — 99396 PREV VISIT EST AGE 40-64: CPT | Performed by: INTERNAL MEDICINE

## 2018-11-28 PROCEDURE — 93000 ELECTROCARDIOGRAM COMPLETE: CPT | Performed by: INTERNAL MEDICINE

## 2018-11-28 RX ORDER — DEXTROAMPHETAMINE SACCHARATE, AMPHETAMINE ASPARTATE MONOHYDRATE, DEXTROAMPHETAMINE SULFATE AND AMPHETAMINE SULFATE 7.5; 7.5; 7.5; 7.5 MG/1; MG/1; MG/1; MG/1
30 CAPSULE, EXTENDED RELEASE ORAL EVERY MORNING
Qty: 30 CAPSULE | Refills: 0 | Status: SHIPPED | OUTPATIENT
Start: 2018-11-28 | End: 2019-01-03 | Stop reason: SDUPTHER

## 2018-11-28 NOTE — PROGRESS NOTES
Subjective     Angely Mathias is a 58 y.o. female who presents for a complete physical exam.      History of Present Illness     HLD.  Cholesterol has increased.  Not as good with diet recently.    ADD.  Good with Adderall XR 30.  Chronic LBP.  Prn Norco is effective for the pain.   Chronic bronchitis.  She is maintained on Dulera and is overall doing well.     Review of Systems   Constitutional: Negative.    HENT: Negative.    Eyes: Negative.    Respiratory: Negative.    Cardiovascular: Negative.    Gastrointestinal: Negative.    Endocrine: Negative.    Genitourinary: Negative.    Musculoskeletal: Positive for back pain.   Skin: Negative.    Allergic/Immunologic: Negative.    Neurological: Negative.    Hematological: Negative.    Psychiatric/Behavioral: Negative.        The following portions of the patient's history were reviewed and updated as appropriate: allergies, current medications, past family history, past medical history, past social history, past surgical history and problem list.  Health maintenance tab was reviewed and updated with the patient.       Patient Active Problem List    Diagnosis Date Noted   • Chronic bronchitis (CMS/HCC) 11/28/2018   • Attention deficit hyperactivity disorder (ADHD), predominantly inattentive type 10/28/2016     Note Last Updated: 10/28/2016     Neuropsych testing in 10/2016 with [facility].       • Lung nodule 10/18/2016     Note Last Updated: 11/28/2018     Stable 10/16-10/18     • Atopic rhinitis 02/08/2016   • Hyperlipidemia 02/08/2016     Note Last Updated: 11/28/2018     Description: 1/2014.  10 year risk ASCVD was 1.7 %; 2.1% 10 year risk.       • Chronic low back pain 02/08/2016     Note Last Updated: 2/8/2016     Description: chronic LBP.  Uses hydrocodone three times a week     • Neck pain 02/08/2016   • Persistent insomnia 02/08/2016       Past Medical History:   Diagnosis Date   • Acute bronchitis    • Acute pain    • Blood tests for routine general  physical examination    • Diarrhea    • Microscopic hematuria        Past Surgical History:   Procedure Laterality Date   • ABDOMINOPLASTY     • BREAST SURGERY      Enlargement Procedure   • CHOLECYSTECTOMY     • DIAGNOSTIC LAPAROSCOPY EXPLORATORY LAPAROTOMY     • DILATATION AND CURETTAGE     • OTHER SURGICAL HISTORY      Vaginal Sling Operation for Stress Incontinence   • WRIST SURGERY         Family History   Problem Relation Age of Onset   • Breast cancer Sister    • Bipolar disorder Brother    • Diabetes Brother        Social History     Socioeconomic History   • Marital status:      Spouse name: Not on file   • Number of children: Not on file   • Years of education: Not on file   • Highest education level: Not on file   Social Needs   • Financial resource strain: Not on file   • Food insecurity - worry: Not on file   • Food insecurity - inability: Not on file   • Transportation needs - medical: Not on file   • Transportation needs - non-medical: Not on file   Occupational History   • Not on file   Tobacco Use   • Smoking status: Never Smoker   Substance and Sexual Activity   • Alcohol use: Yes   • Drug use: Not on file   • Sexual activity: Not on file   Other Topics Concern   • Not on file   Social History Narrative   • Not on file       Current Outpatient Medications on File Prior to Visit   Medication Sig Dispense Refill   • HYDROcodone-acetaminophen (NORCO)  MG per tablet Take 1 tablet by mouth Every 6 (Six) Hours As Needed for Moderate Pain . 50 tablet 0   • Ibuprofen 200 MG capsule Take by mouth nightly.     • mometasone-formoterol (DULERA 100) 100-5 MCG/ACT inhaler Dulera 100-5 MCG/ACT Inhalation Aerosol; Patient Sig: Dulera 100-5 MCG/ACT Inhalation Aerosol INHALE 2 PUFFS BY MOUTH TWICE DAILY; 13; 2; 24-Jul-2015; Active     • Multiple Vitamin (MULTI-VITAMIN) tablet Take by mouth.     • norethindrone-ethinyl estradiol (FEMHRT 1/5) 1-5 MG-MCG tablet Take by mouth.     • Pseudoephedrine HCl  "(SUDAFED PO) Take by mouth.     • [DISCONTINUED] amphetamine-dextroamphetamine XR (ADDERALL XR) 30 MG 24 hr capsule Take 1 capsule by mouth Every Morning 30 capsule 0   • [DISCONTINUED] Azelastine-Fluticasone 137-50 MCG/ACT suspension into each nostril.     • [DISCONTINUED] omeprazole (PriLOSEC) 20 MG capsule Take 20 mg by mouth daily.     • Biotin 1 MG capsule Take by mouth.     • Calcium Carbonate-Vitamin D (CALCIUM + D PO) Take by mouth.     • diclofenac (VOLTAREN) 1 % gel gel Apply 2 g topically 4 (Four) Times a Day As Needed (swelling). 100 g 0   • [DISCONTINUED] fexofenadine (ALLEGRA) 180 MG tablet Take 180 mg by mouth daily.     • [DISCONTINUED] hepatitis A (HAVRIX) 1440 EL U/ML vaccine Inject  into the shoulder, thigh, or buttocks. 1 mL 0   • [DISCONTINUED] hepatitis A (HAVRIX) 1440 EL U/ML vaccine Inject  into the appropriate muscle as directed by prescriber. 1 mL 0   • [DISCONTINUED] HYDROcod Polst-CPM Polst ER (TUSSIONEX PENNKINETIC ER) 10-8 MG/5ML ER suspension Take 5 mL by mouth Every 12 (Twelve) Hours As Needed for Cough. 115 mL 0   • [DISCONTINUED] levocetirizine (XYZAL) 5 MG tablet Take 1 tablet by mouth daily.     • [DISCONTINUED] vitamin E 400 UNIT capsule Take 800 Units by mouth daily.       No current facility-administered medications on file prior to visit.        Allergies   Allergen Reactions   • Contrast Dye Shortness Of Breath   • Pistachio Nut (Diagnostic) Anaphylaxis   • Codeine Itching   • Morphine And Related Itching and Rash   • Sulfa Antibiotics Itching and Rash       Immunization History   Administered Date(s) Administered   • Flu Vaccine Intradermal Quad 18-64YR 10/11/2018   • Hepatitis A 04/30/2018, 11/15/2018   • Tdap 01/24/2014       Objective     /80   Pulse 90   Ht 175.3 cm (69.02\")   Wt 78 kg (172 lb)   SpO2 98%   BMI 25.39 kg/m²       Physical Exam   Constitutional: She is oriented to person, place, and time. She appears well-developed and well-nourished.   HENT: "   Head: Normocephalic and atraumatic.   Right Ear: Hearing, tympanic membrane and external ear normal.   Left Ear: Hearing, tympanic membrane and external ear normal.   Nose: Nose normal.   Mouth/Throat: Oropharynx is clear and moist.   Neck: Neck supple. No thyromegaly present.   Cardiovascular: Normal rate, regular rhythm and normal heart sounds.   No murmur heard.  Pulmonary/Chest: Effort normal and breath sounds normal. Right breast exhibits no mass. Left breast exhibits no mass.   Abdominal: Soft. She exhibits no distension. There is no hepatosplenomegaly. There is no tenderness.   Genitourinary: No breast tenderness.   Lymphadenopathy:     She has no cervical adenopathy.   Neurological: She is alert and oriented to person, place, and time.   Skin: Skin is warm and dry.   Psychiatric: She has a normal mood and affect. Her speech is normal and behavior is normal. Judgment and thought content normal. Cognition and memory are normal.       ECG 12 Lead  Date/Time: 11/28/2018 8:56 AM  Performed by: Cynthia Reid MD  Authorized by: Cynthia Reid MD   Comparison: compared with previous ECG from 9/8/2017  Similar to previous ECG  Rhythm: sinus rhythm  Rate: normal  Conduction: conduction normal  ST Segments: ST segments normal  T Waves: T waves normal  QRS axis: normal  Clinical impression: normal ECG            Assessment/Plan   Angely was seen today for annual exam.    Diagnoses and all orders for this visit:    Well adult exam    Need for hepatitis C screening test  -     Hepatitis C Antibody    Hyperlipidemia, unspecified hyperlipidemia type    Chronic bilateral low back pain with sciatica, sciatica laterality unspecified    Attention deficit hyperactivity disorder (ADHD), predominantly inattentive type    Chronic bronchitis, unspecified chronic bronchitis type (CMS/HCC)    Other orders  -     amphetamine-dextroamphetamine XR (ADDERALL XR) 30 MG 24 hr capsule; Take 1 capsule by mouth Every Morning  -     ECG  12 Lead        Discussion    Patient presents today for a CPE.      Patient follows a healthy diet.   Mammogram is up to date.   Colon cancer screening is up to date.   Pap smears are performed by the patient's gynecologist.   Immunizations are up to date.    ADD.  Continue current regimen.  Chronic LBP.  Update contract for prn Norco.    HLD.   I recommend a diet high in fruits, vegetables, whole grains, lean meats, nuts and beans.  I recommend limiting red meat, full fat dairy, eggs and processed white carbohydrates.  I recommend aerobic exercise at least 3 days per week. I also recommend to watch salt intake.    Chronic bronchitis.  Continue current regimen.  I have recommended that the patient get the following immunizations:  Shingrix.      Health Maintenance   Topic Date Due   • ZOSTER VACCINE (1 of 2) 05/02/2008   • HEPATITIS C SCREENING  02/08/2016   • ANNUAL PHYSICAL  09/09/2018   • LIPID PANEL  11/16/2019   • MAMMOGRAM  09/01/2020   • PAP SMEAR  09/01/2021   • COLONOSCOPY  08/03/2022   • TDAP/TD VACCINES (2 - Td) 01/24/2024   • INFLUENZA VACCINE  Completed            No future appointments.

## 2018-11-29 LAB — HCV AB S/CO SERPL IA: <0.1 S/CO RATIO (ref 0–0.9)

## 2019-01-03 RX ORDER — DEXTROAMPHETAMINE SACCHARATE, AMPHETAMINE ASPARTATE MONOHYDRATE, DEXTROAMPHETAMINE SULFATE AND AMPHETAMINE SULFATE 7.5; 7.5; 7.5; 7.5 MG/1; MG/1; MG/1; MG/1
30 CAPSULE, EXTENDED RELEASE ORAL EVERY MORNING
Qty: 30 CAPSULE | Refills: 0 | Status: SHIPPED | OUTPATIENT
Start: 2019-01-03 | End: 2019-02-07 | Stop reason: SDUPTHER

## 2019-01-07 VITALS
DIASTOLIC BLOOD PRESSURE: 80 MMHG | SYSTOLIC BLOOD PRESSURE: 110 MMHG | WEIGHT: 172 LBS | BODY MASS INDEX: 25.48 KG/M2 | HEART RATE: 90 BPM | HEIGHT: 69 IN | OXYGEN SATURATION: 98 %

## 2019-01-08 DIAGNOSIS — E78.5 HYPERLIPIDEMIA, UNSPECIFIED HYPERLIPIDEMIA TYPE: Primary | ICD-10-CM

## 2019-01-16 RX ORDER — SCOLOPAMINE TRANSDERMAL SYSTEM 1 MG/1
1 PATCH, EXTENDED RELEASE TRANSDERMAL
Qty: 4 PATCH | Refills: 0 | Status: SHIPPED | OUTPATIENT
Start: 2019-01-16 | End: 2019-02-13

## 2019-02-07 RX ORDER — DEXTROAMPHETAMINE SACCHARATE, AMPHETAMINE ASPARTATE MONOHYDRATE, DEXTROAMPHETAMINE SULFATE AND AMPHETAMINE SULFATE 7.5; 7.5; 7.5; 7.5 MG/1; MG/1; MG/1; MG/1
30 CAPSULE, EXTENDED RELEASE ORAL EVERY MORNING
Qty: 30 CAPSULE | Refills: 0 | Status: SHIPPED | OUTPATIENT
Start: 2019-02-07 | End: 2019-03-07 | Stop reason: SDUPTHER

## 2019-02-07 RX ORDER — HYDROCODONE BITARTRATE AND ACETAMINOPHEN 10; 325 MG/1; MG/1
1 TABLET ORAL EVERY 6 HOURS PRN
Qty: 50 TABLET | Refills: 0 | Status: SHIPPED | OUTPATIENT
Start: 2019-02-07 | End: 2019-09-04 | Stop reason: SDUPTHER

## 2019-02-13 ENCOUNTER — HOSPITAL ENCOUNTER (OUTPATIENT)
Dept: GENERAL RADIOLOGY | Facility: HOSPITAL | Age: 59
Discharge: HOME | End: 2019-02-13

## 2019-02-13 ENCOUNTER — OFFICE VISIT (OUTPATIENT)
Dept: INTERNAL MEDICINE | Facility: CLINIC | Age: 59
End: 2019-02-13

## 2019-02-13 ENCOUNTER — HOSPITAL ENCOUNTER (OUTPATIENT)
Dept: GENERAL RADIOLOGY | Facility: HOSPITAL | Age: 59
Discharge: HOME | End: 2019-02-13
Admitting: INTERNAL MEDICINE

## 2019-02-13 VITALS
HEART RATE: 86 BPM | OXYGEN SATURATION: 98 % | WEIGHT: 178 LBS | BODY MASS INDEX: 26.27 KG/M2 | DIASTOLIC BLOOD PRESSURE: 76 MMHG | SYSTOLIC BLOOD PRESSURE: 120 MMHG

## 2019-02-13 DIAGNOSIS — M54.50 LOW BACK PAIN WITHOUT SCIATICA, UNSPECIFIED BACK PAIN LATERALITY, UNSPECIFIED CHRONICITY: Primary | ICD-10-CM

## 2019-02-13 DIAGNOSIS — M54.50 LOW BACK PAIN WITHOUT SCIATICA, UNSPECIFIED BACK PAIN LATERALITY, UNSPECIFIED CHRONICITY: ICD-10-CM

## 2019-02-13 DIAGNOSIS — M54.2 NECK PAIN: ICD-10-CM

## 2019-02-13 PROCEDURE — 72110 X-RAY EXAM L-2 SPINE 4/>VWS: CPT

## 2019-02-13 PROCEDURE — 72050 X-RAY EXAM NECK SPINE 4/5VWS: CPT

## 2019-02-13 PROCEDURE — 99213 OFFICE O/P EST LOW 20 MIN: CPT | Performed by: INTERNAL MEDICINE

## 2019-02-13 RX ORDER — CYCLOBENZAPRINE HCL 10 MG
10 TABLET ORAL NIGHTLY
Qty: 30 TABLET | Refills: 0 | Status: SHIPPED | OUTPATIENT
Start: 2019-02-13 | End: 2019-03-19 | Stop reason: SDUPTHER

## 2019-02-13 RX ORDER — NAPROXEN 500 MG/1
500 TABLET ORAL 2 TIMES DAILY WITH MEALS
Qty: 60 TABLET | Refills: 0 | Status: SHIPPED | OUTPATIENT
Start: 2019-02-13 | End: 2019-04-04 | Stop reason: SDUPTHER

## 2019-02-13 NOTE — PROGRESS NOTES
Subjective     Angely Mathias is a 58 y.o. female who presents with   Chief Complaint   Patient presents with   • Neck Pain       History of Present Illness     C/o neck pain since Christmas.  Unable to move it.  She states it feels like whiplash.  Pain has waxed and waned since.  No radiation of the pain.  She takes advil and norco for the pain.      C/o LBP.  Low right sided.  Started after twisting in the shower on Janurary 26th.  It continue to bother but it is getting better.      Review of Systems   Neurological: Negative for weakness and numbness.       The following portions of the patient's history were reviewed and updated as appropriate: allergies, current medications and problem list.    Patient Active Problem List    Diagnosis Date Noted   • Chronic bronchitis (CMS/HCC) 11/28/2018   • Attention deficit hyperactivity disorder (ADHD), predominantly inattentive type 10/28/2016     Note Last Updated: 10/28/2016     Neuropsych testing in 10/2016 with [facility].       • Lung nodule 10/18/2016     Note Last Updated: 11/28/2018     Stable 10/16-10/18     • Atopic rhinitis 02/08/2016   • Hyperlipidemia 02/08/2016     Note Last Updated: 11/28/2018     Description: 1/2014.  10 year risk ASCVD was 1.7 %; 2.1% 10 year risk.       • Chronic low back pain 02/08/2016     Note Last Updated: 2/8/2016     Description: chronic LBP.  Uses hydrocodone three times a week     • Neck pain 02/08/2016   • Persistent insomnia 02/08/2016       Current Outpatient Medications on File Prior to Visit   Medication Sig Dispense Refill   • amphetamine-dextroamphetamine XR (ADDERALL XR) 30 MG 24 hr capsule Take 1 capsule by mouth Every Morning 30 capsule 0   • bimatoprost (LUMIGAN) 0.01 % ophthalmic drops 1 drop Every Night.     • Biotin 1 MG capsule Take by mouth.     • Calcium Carbonate-Vitamin D (CALCIUM + D PO) Take by mouth.     • diclofenac (VOLTAREN) 1 % gel gel Apply 2 g topically 4 (Four) Times a Day As Needed  (swelling). 100 g 0   • HYDROcodone-acetaminophen (NORCO)  MG per tablet Take 1 tablet by mouth Every 6 (Six) Hours As Needed for Moderate Pain . 50 tablet 0   • mometasone-formoterol (DULERA 100) 100-5 MCG/ACT inhaler Dulera 100-5 MCG/ACT Inhalation Aerosol; Patient Sig: Dulera 100-5 MCG/ACT Inhalation Aerosol INHALE 2 PUFFS BY MOUTH TWICE DAILY; 13; 2; 24-Jul-2015; Active     • Multiple Vitamin (MULTI-VITAMIN) tablet Take by mouth.     • norethindrone-ethinyl estradiol (FEMHRT 1/5) 1-5 MG-MCG tablet Take by mouth.     • Pseudoephedrine HCl (SUDAFED PO) Take by mouth.       No current facility-administered medications on file prior to visit.        Objective     /76   Pulse 86   Wt 80.7 kg (178 lb)   SpO2 98%   BMI 26.27 kg/m²     Physical Exam   Constitutional: She is oriented to person, place, and time. She appears well-developed and well-nourished.   HENT:   Head: Normocephalic and atraumatic.   Pulmonary/Chest: Effort normal.   Neurological: She is alert and oriented to person, place, and time. She has normal strength. No sensory deficit.   Reflex Scores:       Bicep reflexes are 1+ on the right side and 1+ on the left side.       Brachioradialis reflexes are 1+ on the right side and 1+ on the left side.       Patellar reflexes are 1+ on the right side and 1+ on the left side.       Achilles reflexes are 1+ on the right side and 1+ on the left side.  Psychiatric: She has a normal mood and affect. Her behavior is normal.       Assessment/Plan   Angely was seen today for neck pain.    Diagnoses and all orders for this visit:    Low back pain without sciatica, unspecified back pain laterality, unspecified chronicity  -     XR Spine Lumbar 4+ View; Future  -     Ambulatory Referral to Physical Therapy    Neck pain  -     XR Spine Cervical Complete 4 or 5 View; Future  -     Ambulatory Referral to Physical Therapy    Other orders  -     naproxen (NAPROSYN) 500 MG tablet; Take 1 tablet by mouth 2 (Two)  Times a Day With Meals.  -     cyclobenzaprine (FLEXERIL) 10 MG tablet; Take 1 tablet by mouth Every Night.        Discussion  Patient presents with neck and back pain.  Check xrays at the hospital.  I discussed with the patient a trial of conservative management with:   NSAIDs, physical therapy and muscle relaxer.  Let me know if not feeling better over the next several weeks or if there is any change in symptoms.         Future Appointments   Date Time Provider Department Center   5/31/2019  9:10 AM [facility] None   12/2/2019  9:10 AM [facility] None   12/6/2019 11:15 AM Cynthia Reid MD [facility] None

## 2019-03-01 ENCOUNTER — APPOINTMENT (OUTPATIENT)
Dept: PHYSICAL THERAPY | Facility: HOSPITAL | Age: 59
End: 2019-03-01

## 2019-03-07 ENCOUNTER — TELEPHONE (OUTPATIENT)
Dept: INTERNAL MEDICINE | Facility: CLINIC | Age: 59
End: 2019-03-07

## 2019-03-07 RX ORDER — DEXTROAMPHETAMINE SACCHARATE, AMPHETAMINE ASPARTATE MONOHYDRATE, DEXTROAMPHETAMINE SULFATE AND AMPHETAMINE SULFATE 7.5; 7.5; 7.5; 7.5 MG/1; MG/1; MG/1; MG/1
30 CAPSULE, EXTENDED RELEASE ORAL EVERY MORNING
Qty: 30 CAPSULE | Refills: 0 | Status: SHIPPED | OUTPATIENT
Start: 2019-03-07 | End: 2019-04-10 | Stop reason: SDUPTHER

## 2019-03-19 RX ORDER — CYCLOBENZAPRINE HCL 10 MG
TABLET ORAL
Qty: 30 TABLET | Refills: 0 | Status: SHIPPED | OUTPATIENT
Start: 2019-03-19 | End: 2019-04-19

## 2019-04-04 RX ORDER — NAPROXEN 500 MG/1
500 TABLET ORAL 2 TIMES DAILY WITH MEALS
Qty: 60 TABLET | Refills: 0 | Status: SHIPPED | OUTPATIENT
Start: 2019-04-04 | End: 2019-05-28 | Stop reason: SDUPTHER

## 2019-04-10 RX ORDER — DEXTROAMPHETAMINE SACCHARATE, AMPHETAMINE ASPARTATE MONOHYDRATE, DEXTROAMPHETAMINE SULFATE AND AMPHETAMINE SULFATE 7.5; 7.5; 7.5; 7.5 MG/1; MG/1; MG/1; MG/1
30 CAPSULE, EXTENDED RELEASE ORAL EVERY MORNING
Qty: 30 CAPSULE | Refills: 0 | Status: SHIPPED | OUTPATIENT
Start: 2019-04-10 | End: 2019-05-15 | Stop reason: SDUPTHER

## 2019-04-18 ENCOUNTER — TELEPHONE (OUTPATIENT)
Dept: INTERNAL MEDICINE | Facility: CLINIC | Age: 59
End: 2019-04-18

## 2019-04-18 NOTE — TELEPHONE ENCOUNTER
She wants to know if there is a muscle relaxer you can give her for during the day that won't make her tired. CLC    Really all of the muscle relaxers could cause fatigue.  SLW    Can she try tizanidine? That is what her  takes and it doesn't make him tired. CLC    It has a drug interaction with the hormones she takes.  It could increase the levels of muscle relaxer.  SLW    She said she is willing to try anything other then the flexeril. CLC

## 2019-04-19 RX ORDER — BACLOFEN 10 MG/1
10 TABLET ORAL 3 TIMES DAILY PRN
Qty: 30 TABLET | Refills: 0 | Status: SHIPPED | OUTPATIENT
Start: 2019-04-19 | End: 2019-10-18

## 2019-05-15 RX ORDER — DEXTROAMPHETAMINE SACCHARATE, AMPHETAMINE ASPARTATE MONOHYDRATE, DEXTROAMPHETAMINE SULFATE AND AMPHETAMINE SULFATE 7.5; 7.5; 7.5; 7.5 MG/1; MG/1; MG/1; MG/1
30 CAPSULE, EXTENDED RELEASE ORAL EVERY MORNING
Qty: 30 CAPSULE | Refills: 0 | Status: SHIPPED | OUTPATIENT
Start: 2019-05-15 | End: 2019-06-11 | Stop reason: SDUPTHER

## 2019-05-29 RX ORDER — NAPROXEN 500 MG/1
TABLET ORAL
Qty: 60 TABLET | Refills: 0 | Status: SHIPPED | OUTPATIENT
Start: 2019-05-29 | End: 2021-03-29

## 2019-05-30 ENCOUNTER — RESULTS ENCOUNTER (OUTPATIENT)
Dept: INTERNAL MEDICINE | Facility: CLINIC | Age: 59
End: 2019-05-30

## 2019-05-30 DIAGNOSIS — E78.5 HYPERLIPIDEMIA, UNSPECIFIED HYPERLIPIDEMIA TYPE: ICD-10-CM

## 2019-06-05 ENCOUNTER — HOSPITAL ENCOUNTER (OUTPATIENT)
Dept: PHYSICAL THERAPY | Facility: HOSPITAL | Age: 59
Setting detail: THERAPIES SERIES
Discharge: HOME | End: 2019-06-05

## 2019-06-05 DIAGNOSIS — M54.2 NECK PAIN: Primary | ICD-10-CM

## 2019-06-05 DIAGNOSIS — M54.50 CHRONIC BILATERAL LOW BACK PAIN WITHOUT SCIATICA: ICD-10-CM

## 2019-06-05 DIAGNOSIS — G89.29 CHRONIC BILATERAL LOW BACK PAIN WITHOUT SCIATICA: ICD-10-CM

## 2019-06-05 PROCEDURE — 97110 THERAPEUTIC EXERCISES: CPT | Performed by: PHYSICAL THERAPIST

## 2019-06-05 PROCEDURE — 97161 PT EVAL LOW COMPLEX 20 MIN: CPT | Performed by: PHYSICAL THERAPIST
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
## 2019-06-11 ENCOUNTER — OFFICE VISIT (OUTPATIENT)
Dept: INTERNAL MEDICINE | Facility: CLINIC | Age: 59
End: 2019-06-11

## 2019-06-11 VITALS
SYSTOLIC BLOOD PRESSURE: 110 MMHG | OXYGEN SATURATION: 98 % | HEIGHT: 69 IN | HEART RATE: 99 BPM | BODY MASS INDEX: 27.4 KG/M2 | DIASTOLIC BLOOD PRESSURE: 80 MMHG | WEIGHT: 185 LBS

## 2019-06-11 DIAGNOSIS — M54.41 ACUTE RIGHT-SIDED LOW BACK PAIN WITH RIGHT-SIDED SCIATICA: Primary | ICD-10-CM

## 2019-06-11 PROBLEM — H40.89 OTHER SPECIFIED GLAUCOMA: Status: ACTIVE | Noted: 2019-06-11

## 2019-06-11 PROCEDURE — 99214 OFFICE O/P EST MOD 30 MIN: CPT | Performed by: INTERNAL MEDICINE

## 2019-06-11 PROCEDURE — 72110 X-RAY EXAM L-2 SPINE 4/>VWS: CPT | Performed by: INTERNAL MEDICINE

## 2019-06-11 RX ORDER — METHYLPREDNISOLONE 4 MG/1
TABLET ORAL
Qty: 21 TABLET | Refills: 0 | Status: SHIPPED | OUTPATIENT
Start: 2019-06-11 | End: 2019-06-18 | Stop reason: SDUPTHER

## 2019-06-11 RX ORDER — DEXTROAMPHETAMINE SACCHARATE, AMPHETAMINE ASPARTATE MONOHYDRATE, DEXTROAMPHETAMINE SULFATE AND AMPHETAMINE SULFATE 7.5; 7.5; 7.5; 7.5 MG/1; MG/1; MG/1; MG/1
30 CAPSULE, EXTENDED RELEASE ORAL EVERY MORNING
Qty: 30 CAPSULE | Refills: 0 | Status: SHIPPED | OUTPATIENT
Start: 2019-06-11 | End: 2019-08-23 | Stop reason: SDUPTHER

## 2019-06-11 NOTE — PROGRESS NOTES
Subjective     Angely Mathias is a 59 y.o. female who presents with   Chief Complaint   Patient presents with   • Back Pain       History of Present Illness     C/o back pain. Sunday morning she awakened with severe back pain.  Night before some lifting.   Right sided LBP.  Radiates around thigh to the knee only but not below her knee.  No weakness.  No tingling is associated.  She took NSAID and hydrocodone.  Little relief with that. Bad at night.  10/10 in the morning.  No incontinence.      Review of Systems   Respiratory: Negative.    Cardiovascular: Negative.    Gastrointestinal: Negative.        The following portions of the patient's history were reviewed and updated as appropriate: allergies, current medications and problem list.    Patient Active Problem List    Diagnosis Date Noted   • Chronic bronchitis (CMS/HCC) 11/28/2018   • Attention deficit hyperactivity disorder (ADHD), predominantly inattentive type 10/28/2016     Note Last Updated: 10/28/2016     Neuropsych testing in 10/2016 with [facility].       • Lung nodule 10/18/2016     Note Last Updated: 11/28/2018     Stable 10/16-10/18     • Atopic rhinitis 02/08/2016   • Hyperlipidemia 02/08/2016     Note Last Updated: 11/28/2018     Description: 1/2014.  10 year risk ASCVD was 1.7 %; 2.1% 10 year risk.       • Chronic low back pain 02/08/2016     Note Last Updated: 2/8/2016     Description: chronic LBP.  Uses hydrocodone three times a week     • Neck pain 02/08/2016   • Persistent insomnia 02/08/2016       Current Outpatient Medications on File Prior to Visit   Medication Sig Dispense Refill   • amphetamine-dextroamphetamine XR (ADDERALL XR) 30 MG 24 hr capsule Take 1 capsule by mouth Every Morning 30 capsule 0   • baclofen (LIORESAL) 10 MG tablet Take 1 tablet by mouth 3 (Three) Times a Day As Needed for Muscle Spasms. 30 tablet 0   • bimatoprost (LUMIGAN) 0.01 % ophthalmic drops 1 drop Every Night.     • Biotin 1 MG capsule Take by mouth.    "  • Calcium Carbonate-Vitamin D (CALCIUM + D PO) Take by mouth.     • diclofenac (VOLTAREN) 1 % gel gel Apply 2 g topically 4 (Four) Times a Day As Needed (swelling). 100 g 0   • HYDROcodone-acetaminophen (NORCO)  MG per tablet Take 1 tablet by mouth Every 6 (Six) Hours As Needed for Moderate Pain . 50 tablet 0   • mometasone-formoterol (DULERA 100) 100-5 MCG/ACT inhaler Dulera 100-5 MCG/ACT Inhalation Aerosol; Patient Sig: Dulera 100-5 MCG/ACT Inhalation Aerosol INHALE 2 PUFFS BY MOUTH TWICE DAILY; 13; 2; 24-Jul-2015; Active     • Multiple Vitamin (MULTI-VITAMIN) tablet Take by mouth.     • naproxen (NAPROSYN) 500 MG tablet TAKE ONE TABLET BY MOUTH TWICE A DAY WITH MEALS 60 tablet 0   • norethindrone-ethinyl estradiol (FEMHRT 1/5) 1-5 MG-MCG tablet Take by mouth.     • Pseudoephedrine HCl (SUDAFED PO) Take by mouth.       No current facility-administered medications on file prior to visit.        Objective     /80   Pulse 99   Ht 175.3 cm (69.02\")   Wt 83.9 kg (185 lb)   SpO2 98%   BMI 27.31 kg/m²     Physical Exam   Constitutional: She is oriented to person, place, and time. She appears well-developed and well-nourished.   HENT:   Head: Normocephalic and atraumatic.   Pulmonary/Chest: Effort normal.   Neurological: She is alert and oriented to person, place, and time. She has normal strength. No sensory deficit.   Reflex Scores:       Patellar reflexes are 2+ on the right side and 2+ on the left side.       Achilles reflexes are 1+ on the right side and 1+ on the left side.  Psychiatric: She has a normal mood and affect. Her behavior is normal.     X-rays of the l/s spine  performed today for following indication:   pain.  X-ray reveal DDD.  No change from 2/14/2019.  X-ray sent to radiology for official interpretation and findings.        Assessment/Plan   Angely was seen today for back pain.    Diagnoses and all orders for this visit:    Acute right-sided low back pain with right-sided sciatica  - "     XR Spine Lumbar 4+ View    Other orders  -     methylPREDNISolone (MEDROL, BOYD,) 4 MG tablet; Take as directed on package instructions.  -     amphetamine-dextroamphetamine XR (ADDERALL XR) 30 MG 24 hr capsule; Take 1 capsule by mouth Every Morning        Discussion    Patient presents with severe right sided LBP with sciatica.  I discussed with the patient a trial of conservative management with:   rest, muscle relaxer and medrol dose boyd.  Let me know if not feeling better over the next several weeks or if there is any change in symptoms.         Future Appointments   Date Time Provider Department Center   6/12/2019  4:00 PM Flaco Betancur, PT [facility]   6/14/2019 10:15 AM Flaco Betancur, PT  [facility]   6/18/2019  4:45 PM Bailee Leon, PT [facility]   6/21/2019  2:45 PM Bailee Leon, PT [facility]   6/25/2019  4:45 PM Bailee Leon, PT [facility]   7/1/2019  4:45 PM Flaco Betancur, PT [facility]   12/2/2019  9:10 AM [facility] None   12/6/2019 11:15 AM Cynthia Reid MD [facility] None

## 2019-06-12 ENCOUNTER — APPOINTMENT (OUTPATIENT)
Dept: PHYSICAL THERAPY | Facility: HOSPITAL | Age: 59
End: 2019-06-12

## 2019-06-14 ENCOUNTER — HOSPITAL ENCOUNTER (OUTPATIENT)
Dept: PHYSICAL THERAPY | Facility: HOSPITAL | Age: 59
Setting detail: THERAPIES SERIES
Discharge: HOME | End: 2019-06-14

## 2019-06-14 DIAGNOSIS — M54.2 NECK PAIN: Primary | ICD-10-CM

## 2019-06-14 DIAGNOSIS — G89.29 CHRONIC BILATERAL LOW BACK PAIN WITHOUT SCIATICA: ICD-10-CM

## 2019-06-14 DIAGNOSIS — M54.50 CHRONIC BILATERAL LOW BACK PAIN WITHOUT SCIATICA: ICD-10-CM

## 2019-06-14 PROCEDURE — 97012 MECHANICAL TRACTION THERAPY: CPT | Performed by: PHYSICAL THERAPIST

## 2019-06-14 PROCEDURE — 97110 THERAPEUTIC EXERCISES: CPT | Performed by: PHYSICAL THERAPIST

## 2019-06-14 NOTE — THERAPY TREATMENT NOTE
Outpatient Physical Therapy Ortho Treatment Note  [facility]     Patient Name: Angely Mathias  : 1960  MRN: [id number]  Today's Date: 2019      Visit Date: 2019    Visit Dx:    ICD-10-CM ICD-9-CM   1. Neck pain M54.2 723.1   2. Chronic bilateral low back pain without sciatica M54.5 724.2    G89.29 338.29       Patient Active Problem List   Diagnosis   • Atopic rhinitis   • Hyperlipidemia   • Chronic low back pain   • Neck pain   • Persistent insomnia   • Lung nodule   • Attention deficit hyperactivity disorder (ADHD), predominantly inattentive type   • Chronic bronchitis (CMS/HCC)   • Other specified glaucoma        Past Medical History:   Diagnosis Date   • Acute bronchitis    • Acute pain    • Blood tests for routine general physical examination    • Diarrhea    • Microscopic hematuria         Past Surgical History:   Procedure Laterality Date   • ABDOMINOPLASTY     • BREAST SURGERY      Enlargement Procedure   • CHOLECYSTECTOMY     • DIAGNOSTIC LAPAROSCOPY EXPLORATORY LAPAROTOMY     • DILATATION AND CURETTAGE     • OTHER SURGICAL HISTORY      Vaginal Sling Operation for Stress Incontinence   • WRIST SURGERY                         PT Assessment/Plan     Row Name 19 1059          PT Assessment    Assessment Comments  Patient returns for first follow up demonstrating bilateral rotatoin >80 degrees bilaterally in sitting. Her pain is well controlled. She did c/o mild suboccipital irritation with trial of mechanical traction; encourage to monitor carryover to assist in adjusting for next visit.  -GR        PT Plan    PT Plan Comments  Assess response to traction - if beneficial increase time parameter and consider adjusting wedges.  -GR       User Key  (r) = Recorded By, (t) = Taken By, (c) = Cosigned By    Initials Name Provider Type    GR Flaco Betancur, PT Physical Therapist          Modalities     Row Name 19 1000             Ice    Ice Applied  Yes  -GR      Location   cervical during traction  -GR      Rx Minutes  12 mins  -GR         Traction 47441    Traction Type  Cervical  -GR      Rx Minutes  12  -GR      Duration  Intermittent  -GR      Position  Other 90/90  -GR      Weight  16 /8  -GR      Hold  45  -GR      Relax  15  -GR        User Key  (r) = Recorded By, (t) = Taken By, (c) = Cosigned By    Initials Name Provider Type    GR Flaco Betancur, PT Physical Therapist        Exercises     Row Name 06/14/19 1000             Subjective Comments    Subjective Comments  Had to  cx last week because her back went out; put on steroid and now doing much better.  Her neck is moving easier and can drive better.  -GR         Subjective Pain    Able to rate subjective pain?  yes  -GR      Pre-Treatment Pain Level  2  -GR      Subjective Pain Comment  FACES  -GR         Total Minutes    16292 - PT Therapeutic Exercise Minutes  28  -GR         Exercise 1    Exercise Name 1  c/spine retraction  -GR      Cueing 1  Demo  -GR      Sets 1  1  -GR      Reps 1  15  -GR      Time 1  5 sec  -GR      Additional Comments  seated and supine  -GR         Exercise 2    Exercise Name 2  scapular retraction  -GR      Cueing 2  Demo  -GR      Sets 2  1  -GR      Reps 2  15  -GR         Exercise 3    Exercise Name 3  reverse shoulder rolls  -GR      Cueing 3  Demo  -GR      Sets 3  1  -GR      Reps 3  15  -GR         Exercise 4    Exercise Name 4  UT stretch  -GR      Cueing 4  Demo  -GR      Sets 4  1  -GR      Reps 4  3  -GR      Time 4  20 seconds  -GR      Additional Comments  each with gentle OP  -GR         Exercise 5    Exercise Name 5  UBE  -GR      Time 5  4 min  -GR      Additional Comments  L1  -GR         Exercise 6    Exercise Name 6  Rows  -GR      Cueing 6  Demo  -GR      Sets 6  2  -GR      Reps 6  10  -GR      Additional Comments  RTB  -GR         Exercise 7    Exercise Name 7  Doorway stretch  -GR      Cueing 7  Demo  -GR      Sets 7  1  -GR      Reps 7  3  -GR      Time 7  20 sec   -GR         Exercise 8    Exercise Name 8  Supine B horizontal abduction  -GR      Cueing 8  Demo  -GR      Sets 8  1  -GR      Reps 8  15  -GR      Additional Comments  RTB  -GR        User Key  (r) = Recorded By, (t) = Taken By, (c) = Cosigned By    Initials Name Provider Type    Flaco Dsouza, PT Physical Therapist                       PT OP Goals     Row Name 06/14/19 1000          PT Short Term Goals    STG Date to Achieve  06/20/19  -GR     STG 1  Patient will be independent with initial HEP.  -GR     STG 1 Progress  Ongoing  -GR     STG 2  Patient will demonstrate = cervical rotation B to 75 degrees or better to assist in ease with driving.  -GR     STG 2 Progress  Ongoing  -GR        Long Term Goals    LTG Date to Achieve  07/20/19  -GR     LTG 1  Patient will be independent with progressive HEP for long term management of current condition.  -GR     LTG 1 Progress  Ongoing  -GR     LTG 2  Patient will report improved sleep by 75% or greater.  -GR     LTG 2 Progress  Ongoing  -GR     LTG 3  Patient will report optimal desk set up for symptom management.  -GR     LTG 3 Progress  Ongoing  -GR     LTG 4  Patient will score </=30% disability on the NDI to indicate improved perceived ADL performance.  -GR     LTG 4 Progress  Ongoing  -GR       User Key  (r) = Recorded By, (t) = Taken By, (c) = Cosigned By    Initials Name Provider Type    Flaco Dsouza, PT Physical Therapist          Therapy Education  Education Details: continue current HEP              Time Calculation:   Start Time: 1015  Stop Time: 1055  Time Calculation (min): 40 min  Total Timed Code Minutes- PT: 28 minute(s)  Therapy Charges for Today     Code Description Service Date Service Provider Modifiers Qty    30645686918  PT THER PROC EA 15 MIN 6/14/2019 Flaco Betancur, PT GP 2    [id number]  PT-TRACTION MECHANICAL 6/14/2019 Flaco Betancur, PT  1    [id number]  PT HOT OR COLD PACK TREAT MCARE 6/14/2019 Flaco Betancur  T, PT GP 1                    Flaco Betancur, PT  6/14/2019

## 2019-06-18 ENCOUNTER — APPOINTMENT (OUTPATIENT)
Dept: PHYSICAL THERAPY | Facility: HOSPITAL | Age: 59
End: 2019-06-18

## 2019-06-18 RX ORDER — METHYLPREDNISOLONE 4 MG/1
TABLET ORAL
Qty: 21 TABLET | Refills: 0 | Status: SHIPPED | OUTPATIENT
Start: 2019-06-18 | End: 2019-06-26

## 2019-06-19 ENCOUNTER — HOSPITAL ENCOUNTER (OUTPATIENT)
Dept: PHYSICAL THERAPY | Facility: HOSPITAL | Age: 59
Setting detail: THERAPIES SERIES
Discharge: HOME | End: 2019-06-19

## 2019-06-19 DIAGNOSIS — G89.29 CHRONIC BILATERAL LOW BACK PAIN WITHOUT SCIATICA: ICD-10-CM

## 2019-06-19 DIAGNOSIS — M54.50 CHRONIC BILATERAL LOW BACK PAIN WITHOUT SCIATICA: ICD-10-CM

## 2019-06-19 DIAGNOSIS — M54.2 NECK PAIN: Primary | ICD-10-CM

## 2019-06-19 PROCEDURE — 97140 MANUAL THERAPY 1/> REGIONS: CPT

## 2019-06-19 PROCEDURE — 97110 THERAPEUTIC EXERCISES: CPT

## 2019-06-19 NOTE — THERAPY TREATMENT NOTE
Outpatient Physical Therapy Ortho Treatment Note  [facility]     Patient Name: Angely Mathias  : 1960  MRN: [id number]  Today's Date: 2019      Visit Date: 2019    Visit Dx:    ICD-10-CM ICD-9-CM   1. Neck pain M54.2 723.1   2. Chronic bilateral low back pain without sciatica M54.5 724.2    G89.29 338.29       Patient Active Problem List   Diagnosis   • Atopic rhinitis   • Hyperlipidemia   • Chronic low back pain   • Neck pain   • Persistent insomnia   • Lung nodule   • Attention deficit hyperactivity disorder (ADHD), predominantly inattentive type   • Chronic bronchitis (CMS/HCC)   • Other specified glaucoma        Past Medical History:   Diagnosis Date   • Acute bronchitis    • Acute pain    • Blood tests for routine general physical examination    • Diarrhea    • Microscopic hematuria         Past Surgical History:   Procedure Laterality Date   • ABDOMINOPLASTY     • BREAST SURGERY      Enlargement Procedure   • CHOLECYSTECTOMY     • DIAGNOSTIC LAPAROSCOPY EXPLORATORY LAPAROTOMY     • DILATATION AND CURETTAGE     • OTHER SURGICAL HISTORY      Vaginal Sling Operation for Stress Incontinence   • WRIST SURGERY                         PT Assessment/Plan     Row Name 19 1833          PT Assessment    Assessment Comments  Pt reporting soreness with mechanical traction so returned to postural exercises and gentle manual distraction/release. Cervical pain seems to be improving overall. Pt complaining of worsening R sided LBP radiating anteriorly RLE to knee. Added several hip flexibility and TA stability exercises to address. She reports she is getting 2nd round of steroids today as well.   -LB        PT Plan    PT Plan Comments  Assess response to last session. Continue focus on reducing compensation, scapular strengthening, TA stabilization.   -LB       User Key  (r) = Recorded By, (t) = Taken By, (c) = Cosigned By    Initials Name Provider Type    Bailee Tovar, PT Physical Therapist             Exercises     Row Name 06/19/19 1700 06/19/19 1600          Subjective Comments    Subjective Comments  --  I am doing ok. My neck is ok. My  back is bothering me the most. I was pretty sore after the pressure from the traction.  -LB        Subjective Pain    Able to rate subjective pain?  --  yes  -LB     Pre-Treatment Pain Level  --  3  -LB        Total Minutes    18394 - PT Therapeutic Exercise Minutes  --  30  -LB     76328 - PT Manual Therapy Minutes  10  -LB  --        Exercise 1    Exercise Name 1  --  c/spine retraction  -LB     Cueing 1  --  Demo  -LB     Sets 1  --  1  -LB     Reps 1  --  15  -LB     Time 1  --  5 sec  -LB     Additional Comments  --  supine   -LB        Exercise 2    Exercise Name 2  --  scapular retraction  -LB     Cueing 2  --  Demo  -LB     Sets 2  --  1  -LB     Reps 2  --  15  -LB     Additional Comments  --  RTB; max cues for retraction  -LB        Exercise 3    Exercise Name 3  --  reverse shoulder rolls  -LB     Cueing 3  --  Demo  -LB     Sets 3  --  1  -LB     Reps 3  --  15  -LB        Exercise 4    Exercise Name 4  --  UT stretch  -LB     Cueing 4  --  Demo  -LB     Sets 4  --  1  -LB     Reps 4  --  3  -LB     Time 4  --  20 seconds  -LB        Exercise 5    Exercise Name 5  --  UBE  -LB     Time 5  --  4 min  -LB     Additional Comments  --  L1  -LB        Exercise 6    Exercise Name 6  --  Rows  -LB     Cueing 6  --  Demo  -LB     Sets 6  --  2  -LB     Reps 6  --  10  -LB     Additional Comments  --  RTB  -LB        Exercise 7    Exercise Name 7  --  Doorway stretch  -LB     Cueing 7  --  Demo  -LB     Sets 7  --  1  -LB     Reps 7  --  3  -LB     Time 7  --  20 sec  -LB        Exercise 8    Exercise Name 8  --  Supine B horizontal abduction  -LB     Cueing 8  --  Demo  -LB     Sets 8  --  1  -LB     Reps 8  --  15  -LB     Additional Comments  --  RTB  -LB        Exercise 9    Exercise Name 9  --  lateral doorway to L only   -LB     Reps 9  --  3  -LB     Time 9   --  20  -LB     Additional Comments  --  for QL  -LB        Exercise 10    Exercise Name 10  --  supine piriformis stretch  -LB     Reps 10  --  3  -LB     Time 10  --  20  -LB        Exercise 11    Exercise Name 11  --  glute set   -LB     Reps 11  --  15  -LB     Time 11  --  5  -LB        Exercise 12    Exercise Name 12  --  iso adduction + PPT  -LB     Reps 12  --  15  -LB     Time 12  --  5  -LB     Additional Comments  --  cuing for TA   -LB        Exercise 13    Exercise Name 13  --  LTR  -LB     Reps 13  --  20  -LB       User Key  (r) = Recorded By, (t) = Taken By, (c) = Cosigned By    Initials Name Provider Type    LB Bailee Leon, PT Physical Therapist                      Manual Rx (last 36 hours)      Manual Treatments     Row Name 06/19/19 1700             Total Minutes    20904 - PT Manual Therapy Minutes  10  -LB         Manual Rx 1    Manual Rx 1 Location  cervical spine  -LB      Manual Rx 1 Type  gentle distraction, suboocipital release, rotational mobilizations  -LB      Manual Rx 1 Duration  10  -LB        User Key  (r) = Recorded By, (t) = Taken By, (c) = Cosigned By    Initials Name Provider Type    LB Bailee Leon, PT Physical Therapist          PT OP Goals     Row Name 06/19/19 1800          PT Short Term Goals    STG Date to Achieve  06/20/19  -LB     STG 1  Patient will be independent with initial HEP.  -LB     STG 1 Progress  Met  -LB     STG 2  Patient will demonstrate = cervical rotation B to 75 degrees or better to assist in ease with driving.  -LB     STG 2 Progress  Ongoing  -LB        Long Term Goals    LTG Date to Achieve  07/20/19  -LB     LTG 1  Patient will be independent with progressive HEP for long term management of current condition.  -LB     LTG 1 Progress  Ongoing  -LB     LTG 2  Patient will report improved sleep by 75% or greater.  -LB     LTG 2 Progress  Ongoing  -LB     LTG 3  Patient will report optimal desk set up for symptom management.  -LB     LTG 3 Progress   Ongoing  -LB     LTG 4  Patient will score </=30% disability on the NDI to indicate improved perceived ADL performance.  -LB     LTG 4 Progress  Ongoing  -LB       User Key  (r) = Recorded By, (t) = Taken By, (c) = Cosigned By    Initials Name Provider Type    Bailee Tovar PT Physical Therapist          Therapy Education  Education Details: progressed HEP for LBP, discussed focus on downtraining upper trap and reducing cervical guarding with all activities  Given: Symptoms/condition management, Posture/body mechanics, HEP  Program: Progressed  How Provided: Verbal, Demonstration, Written  Provided to: Patient  Level of Understanding: Teach back education performed, Verbalized              Time Calculation:   Start Time: 1615  Stop Time: 1700  Time Calculation (min): 45 min  Total Timed Code Minutes- PT: 44 minute(s)  Therapy Charges for Today     Code Description Service Date Service Provider Modifiers Qty    82475150023  PT THER PROC EA 15 MIN 6/19/2019 Bailee Leon, PT GP 2    [id number]  PT MANUAL THERAPY EA 15 MIN 6/19/2019 Bailee Leon, PT GP 1                    Bailee Leon PT  6/19/2019

## 2019-06-21 ENCOUNTER — HOSPITAL ENCOUNTER (OUTPATIENT)
Dept: PHYSICAL THERAPY | Facility: HOSPITAL | Age: 59
Setting detail: THERAPIES SERIES
Discharge: HOME | End: 2019-06-21

## 2019-06-21 DIAGNOSIS — M54.2 NECK PAIN: Primary | ICD-10-CM

## 2019-06-21 DIAGNOSIS — G89.29 CHRONIC BILATERAL LOW BACK PAIN WITHOUT SCIATICA: ICD-10-CM

## 2019-06-21 DIAGNOSIS — M54.50 CHRONIC BILATERAL LOW BACK PAIN WITHOUT SCIATICA: ICD-10-CM

## 2019-06-21 PROCEDURE — 97140 MANUAL THERAPY 1/> REGIONS: CPT | Performed by: PHYSICAL THERAPIST

## 2019-06-21 PROCEDURE — 97110 THERAPEUTIC EXERCISES: CPT | Performed by: PHYSICAL THERAPIST

## 2019-06-21 NOTE — THERAPY TREATMENT NOTE
Outpatient Physical Therapy Ortho Treatment Note  [facility]     Patient Name: Angely Mathias  : 1960  MRN: [id number]  Today's Date: 2019      Visit Date: 2019    Visit Dx:    ICD-10-CM ICD-9-CM   1. Neck pain M54.2 723.1   2. Chronic bilateral low back pain without sciatica M54.5 724.2    G89.29 338.29       Patient Active Problem List   Diagnosis   • Atopic rhinitis   • Hyperlipidemia   • Chronic low back pain   • Neck pain   • Persistent insomnia   • Lung nodule   • Attention deficit hyperactivity disorder (ADHD), predominantly inattentive type   • Chronic bronchitis (CMS/HCC)   • Other specified glaucoma        Past Medical History:   Diagnosis Date   • Acute bronchitis    • Acute pain    • Blood tests for routine general physical examination    • Diarrhea    • Microscopic hematuria         Past Surgical History:   Procedure Laterality Date   • ABDOMINOPLASTY     • BREAST SURGERY      Enlargement Procedure   • CHOLECYSTECTOMY     • DIAGNOSTIC LAPAROSCOPY EXPLORATORY LAPAROTOMY     • DILATATION AND CURETTAGE     • OTHER SURGICAL HISTORY      Vaginal Sling Operation for Stress Incontinence   • WRIST SURGERY                         PT Assessment/Plan     Row Name 19 1549          PT Assessment    Assessment Comments  Overactivation of UT noted continuously; held UT stretch and added levator stretch for comfort this visit. Briefly discussed option of dry needling for patient to consider.  -GR        PT Plan    PT Plan Comments  Patient to consider DDN.  -GR       User Key  (r) = Recorded By, (t) = Taken By, (c) = Cosigned By    Initials Name Provider Type    Flaco Dsouza, PT Physical Therapist          Modalities     Row Name 19 1400             Moist Heat    MH Applied  Yes  -GR      Location  thoracic and lumbar  -GR      Rx Minutes  10 mins  -GR        User Key  (r) = Recorded By, (t) = Taken By, (c) = Cosigned By    Initials Name Provider Type    Flaco Dsouza,  PT Physical Therapist        Exercises     Row Name 06/21/19 1500 06/21/19 1400          Subjective Comments    Subjective Comments  --  States her whole body was sore after last time but then better the next day.  -GR        Subjective Pain    Able to rate subjective pain?  --  yes  -GR     Pre-Treatment Pain Level  --  4  -GR        Total Minutes    89173 - PT Therapeutic Exercise Minutes  --  35  -GR     37804 - PT Manual Therapy Minutes  10  -GR  --        Exercise 1    Exercise Name 1  --  c/spine retraction  -GR     Cueing 1  --  Demo  -GR     Sets 1  --  1  -GR     Reps 1  --  15  -GR     Time 1  --  5 sec  -GR     Additional Comments  --  supine  -GR        Exercise 2    Exercise Name 2  --  B shoulder extension  -GR     Cueing 2  --  Demo  -GR     Sets 2  --  2  -GR     Reps 2  --  10  -GR     Additional Comments  --  RTB  -GR        Exercise 4    Exercise Name 4  --  substituted: Levator stretch seated  with gentle OP  -GR     Cueing 4  --  Demo  -GR     Sets 4  --  1  -GR     Reps 4  --  3  -GR     Time 4  --  20 seconds  -GR        Exercise 5    Exercise Name 5  --  UBE  -GR     Time 5  --  4 min  -GR     Additional Comments  --  L1  -GR        Exercise 6    Exercise Name 6  --  Rows  -GR     Cueing 6  --  Demo  -GR     Sets 6  --  2  -GR     Reps 6  --  10  -GR     Additional Comments  --  RTB  -GR        Exercise 7    Exercise Name 7  --  Doorway stretch  -GR     Cueing 7  --  Demo  -GR     Sets 7  --  1  -GR     Reps 7  --  3  -GR     Time 7  --  20 sec  -GR        Exercise 8    Exercise Name 8  --  Supine B horizontal abduction  -GR     Cueing 8  --  Demo  -GR     Sets 8  --  2  -GR     Reps 8  --  10  -GR     Additional Comments  --  RTB  -GR        Exercise 9    Exercise Name 9  --  lateral doorway to L only   -GR     Reps 9  --  3  -GR     Time 9  --  20  -GR     Additional Comments  --  QL  -GR        Exercise 10    Exercise Name 10  --  supine piriformis stretch  -GR     Reps 10  --  3  -GR      Time 10  --  20  -GR        Exercise 14    Exercise Name 14  --  rhomboid stretch  -GR     Cueing 14  --  Demo  -GR     Sets 14  --  1  -GR     Reps 14  --  3  -GR     Time 14  --  20 seconds  -GR     Additional Comments  --  with BUE flex hands clasped  -GR        Exercise 15    Exercise Name 15  --  seated thoracic extension over foam roller  -GR     Cueing 15  --  Demo  -GR     Sets 15  --  1  -GR     Reps 15  --  10  -GR     Time 15  --  5 sec  -GR        Exercise 16    Exercise Name 16  --  SNAG rotation  -GR     Cueing 16  --  Demo  -GR     Sets 16  --  1  -GR     Reps 16  --  3  -GR     Time 16  --  10 seconds each direction  -GR       User Key  (r) = Recorded By, (t) = Taken By, (c) = Cosigned By    Initials Name Provider Type    Flaco Dsouza, PT Physical Therapist                      Manual Rx (last 36 hours)      Manual Treatments     Row Name 06/21/19 1500             Total Minutes    42830 - PT Manual Therapy Minutes  10  -GR         Manual Rx 1    Manual Rx 1 Location  cervical spine  -GR      Manual Rx 1 Type  gentle distraction, suboocipital release, rotational mobilizations  -GR         Manual Rx 2    Manual Rx 2 Location  UT/levator  -GR      Manual Rx 2 Type  STM  -GR        User Key  (r) = Recorded By, (t) = Taken By, (c) = Cosigned By    Initials Name Provider Type    Flaco Dsouza, PT Physical Therapist          PT OP Goals     Row Name 06/21/19 1500          PT Short Term Goals    STG Date to Achieve  06/20/19  -GR     STG 1  Patient will be independent with initial HEP.  -GR     STG 1 Progress  Met  -GR     STG 2  Patient will demonstrate = cervical rotation B to 75 degrees or better to assist in ease with driving.  -GR     STG 2 Progress  Ongoing  -GR        Long Term Goals    LTG Date to Achieve  07/20/19  -GR     LTG 1  Patient will be independent with progressive HEP for long term management of current condition.  -GR     LTG 1 Progress  Ongoing  -GR     LTG 2  Patient will  report improved sleep by 75% or greater.  -GR     LTG 2 Progress  Ongoing  -GR     LTG 3  Patient will report optimal desk set up for symptom management.  -GR     LTG 3 Progress  Ongoing  -GR     LTG 4  Patient will score </=30% disability on the NDI to indicate improved perceived ADL performance.  -GR     LTG 4 Progress  Ongoing  -GR       User Key  (r) = Recorded By, (t) = Taken By, (c) = Cosigned By    Initials Name Provider Type    Flaco Dsouza, PT Physical Therapist          Therapy Education  Education Details: udpated handout to trial levator stretch              Time Calculation:   Start Time: 1445  Stop Time: 1530  Time Calculation (min): 45 min  Total Timed Code Minutes- PT: 45 minute(s)  Therapy Charges for Today     Code Description Service Date Service Provider Modifiers Qty    48982277222  PT THER PROC EA 15 MIN 6/21/2019 Flaco Betancur, PT GP 2    [id number]  PT MANUAL THERAPY EA 15 MIN 6/21/2019 Flaco Betancur, PT GP 1    [id number]  PT HOT OR COLD PACK TREAT MCARE 6/21/2019 Flaco Betancur, PT GP 1                    Flaco WEINBERG. MALCOLM Betancur  6/21/2019

## 2019-06-25 ENCOUNTER — APPOINTMENT (OUTPATIENT)
Dept: PHYSICAL THERAPY | Facility: HOSPITAL | Age: 59
End: 2019-06-25

## 2019-06-26 ENCOUNTER — OFFICE VISIT (OUTPATIENT)
Dept: INTERNAL MEDICINE | Facility: CLINIC | Age: 59
End: 2019-06-26

## 2019-06-26 VITALS
SYSTOLIC BLOOD PRESSURE: 120 MMHG | BODY MASS INDEX: 27.4 KG/M2 | DIASTOLIC BLOOD PRESSURE: 84 MMHG | WEIGHT: 185 LBS | HEART RATE: 85 BPM | HEIGHT: 69 IN | OXYGEN SATURATION: 97 %

## 2019-06-26 DIAGNOSIS — E66.3 OVERWEIGHT: Primary | ICD-10-CM

## 2019-06-26 PROCEDURE — 99213 OFFICE O/P EST LOW 20 MIN: CPT | Performed by: INTERNAL MEDICINE

## 2019-06-26 RX ORDER — PHENTERMINE HYDROCHLORIDE 30 MG/1
30 CAPSULE ORAL EVERY MORNING
Qty: 30 CAPSULE | Refills: 2 | Status: SHIPPED | OUTPATIENT
Start: 2019-06-26 | End: 2019-08-23

## 2019-06-26 NOTE — PROGRESS NOTES
Subjective     Angely Mathias is a 59 y.o. female who presents with   Chief Complaint   Patient presents with   • Weight Check       History of Present Illness     Overweight.  She has been on phentermine in the past.  She is requesting to return to it.  She has no issue taking it in the past.  Discussed stopping phentermine since it is a stimulant as well.    Review of Systems   Respiratory: Negative.    Cardiovascular: Negative.        The following portions of the patient's history were reviewed and updated as appropriate: allergies, current medications and problem list.    Patient Active Problem List    Diagnosis Date Noted   • Other specified glaucoma 06/11/2019   • Chronic bronchitis (CMS/HCC) 11/28/2018   • Attention deficit hyperactivity disorder (ADHD), predominantly inattentive type 10/28/2016     Note Last Updated: 10/28/2016     Neuropsych testing in 10/2016 with [facility].       • Lung nodule 10/18/2016     Note Last Updated: 11/28/2018     Stable 10/16-10/18     • Atopic rhinitis 02/08/2016   • Hyperlipidemia 02/08/2016     Note Last Updated: 11/28/2018     Description: 1/2014.  10 year risk ASCVD was 1.7 %; 2.1% 10 year risk.       • Chronic low back pain 02/08/2016     Note Last Updated: 2/8/2016     Description: chronic LBP.  Uses hydrocodone three times a week     • Neck pain 02/08/2016   • Persistent insomnia 02/08/2016   • Overweight 02/08/2016       Current Outpatient Medications on File Prior to Visit   Medication Sig Dispense Refill   • amphetamine-dextroamphetamine XR (ADDERALL XR) 30 MG 24 hr capsule Take 1 capsule by mouth Every Morning 30 capsule 0   • baclofen (LIORESAL) 10 MG tablet Take 1 tablet by mouth 3 (Three) Times a Day As Needed for Muscle Spasms. 30 tablet 0   • bimatoprost (LUMIGAN) 0.01 % ophthalmic drops 1 drop Every Night.     • Calcium Carbonate-Vitamin D (CALCIUM + D PO) Take by mouth.     • diclofenac (VOLTAREN) 1 % gel gel Apply 2 g topically 4 (Four) Times a  "Day As Needed (swelling). 100 g 0   • HYDROcodone-acetaminophen (NORCO)  MG per tablet Take 1 tablet by mouth Every 6 (Six) Hours As Needed for Moderate Pain . 50 tablet 0   • mometasone-formoterol (DULERA 100) 100-5 MCG/ACT inhaler Dulera 100-5 MCG/ACT Inhalation Aerosol; Patient Sig: Dulera 100-5 MCG/ACT Inhalation Aerosol INHALE 2 PUFFS BY MOUTH TWICE DAILY; 13; 2; 24-Jul-2015; Active     • Multiple Vitamin (MULTI-VITAMIN) tablet Take by mouth.     • naproxen (NAPROSYN) 500 MG tablet TAKE ONE TABLET BY MOUTH TWICE A DAY WITH MEALS 60 tablet 0   • norethindrone-ethinyl estradiol (FEMHRT 1/5) 1-5 MG-MCG tablet Take by mouth.     • [DISCONTINUED] Biotin 1 MG capsule Take by mouth.     • [DISCONTINUED] methylPREDNISolone (MEDROL, BOYD,) 4 MG tablet Take as directed on package instructions. 21 tablet 0   • [DISCONTINUED] Pseudoephedrine HCl (SUDAFED PO) Take by mouth.       No current facility-administered medications on file prior to visit.        Objective     /84   Pulse 85   Ht 175.3 cm (69.02\")   Wt 83.9 kg (185 lb)   SpO2 97%   BMI 27.31 kg/m²     Physical Exam   Constitutional: She is oriented to person, place, and time. She appears well-developed and well-nourished.   HENT:   Head: Normocephalic and atraumatic.   Pulmonary/Chest: Effort normal.   Neurological: She is alert and oriented to person, place, and time.   Psychiatric: She has a normal mood and affect. Her behavior is normal.       Assessment/Plan   Angely was seen today for weight check.    Diagnoses and all orders for this visit:    Overweight    Other orders  -     phentermine 30 MG capsule; Take 1 capsule by mouth Every Morning.        Discussion   Patient presents in f/u of being overweight.  She requests to go back to phentermine.  Discussed with the patient onset on action and the potential side effects including palpitations and hypertension.  The patient will let me know of any side effects from the medication.    She is going " to stop Adderall.           Future Appointments   Date Time Provider Department Center   7/1/2019  4:45 PM Flaco Betancur PT [facility]   12/2/2019  9:10 AM [facility] None   12/6/2019 11:15 AM Cynthia Ried MD [facility] None          disoriented to place/disoriented to time/situation

## 2019-07-01 ENCOUNTER — HOSPITAL ENCOUNTER (OUTPATIENT)
Dept: PHYSICAL THERAPY | Facility: HOSPITAL | Age: 59
Setting detail: THERAPIES SERIES
Discharge: HOME | End: 2019-07-01

## 2019-07-01 DIAGNOSIS — M54.2 NECK PAIN: Primary | ICD-10-CM

## 2019-07-01 DIAGNOSIS — G89.29 CHRONIC BILATERAL LOW BACK PAIN WITHOUT SCIATICA: ICD-10-CM

## 2019-07-01 DIAGNOSIS — M54.50 CHRONIC BILATERAL LOW BACK PAIN WITHOUT SCIATICA: ICD-10-CM

## 2019-07-01 PROCEDURE — 97140 MANUAL THERAPY 1/> REGIONS: CPT | Performed by: PHYSICAL THERAPIST

## 2019-07-01 PROCEDURE — 97110 THERAPEUTIC EXERCISES: CPT | Performed by: PHYSICAL THERAPIST

## 2019-07-01 NOTE — THERAPY RE-EVALUATION
Outpatient Physical Therapy Ortho Re-Evaluation  [facility]     Patient Name: Angely Mathias  : 1960  MRN: [id number]  Today's Date: 2019      Visit Date: 2019    Patient Active Problem List   Diagnosis   • Atopic rhinitis   • Hyperlipidemia   • Chronic low back pain   • Neck pain   • Persistent insomnia   • Overweight   • Lung nodule   • Attention deficit hyperactivity disorder (ADHD), predominantly inattentive type   • Chronic bronchitis (CMS/HCC)   • Other specified glaucoma        Past Medical History:   Diagnosis Date   • Acute bronchitis    • Acute pain    • Blood tests for routine general physical examination    • Diarrhea    • Microscopic hematuria         Past Surgical History:   Procedure Laterality Date   • ABDOMINOPLASTY     • BREAST SURGERY      Enlargement Procedure   • CHOLECYSTECTOMY     • DIAGNOSTIC LAPAROSCOPY EXPLORATORY LAPAROTOMY     • DILATATION AND CURETTAGE     • OTHER SURGICAL HISTORY      Vaginal Sling Operation for Stress Incontinence   • WRIST SURGERY         Visit Dx:     ICD-10-CM ICD-9-CM   1. Neck pain M54.2 723.1   2. Chronic bilateral low back pain without sciatica M54.5 724.2    G89.29 338.29                                   PT OP Goals     Row Name 19 1600          PT Short Term Goals    STG Date to Achieve  19  -GR     STG 1  Patient will be independent with initial HEP.  -GR     STG 1 Progress  Met  -GR     STG 2  Patient will demonstrate = cervical rotation B to 75 degrees or better to assist in ease with driving.  -GR     STG 2 Progress  Met  -GR     STG 2 Progress Comments  80 B  -GR        Long Term Goals    LTG Date to Achieve  19  -GR     LTG 1  Patient will be independent with progressive HEP for long term management of current condition.  -GR     LTG 1 Progress  Ongoing  -GR     LTG 2  Patient will report improved sleep by 75% or greater.  -GR     LTG 2 Progress  Ongoing  -GR     LTG 2 Progress Comments  sleep is the same  -GR      LTG 3  Patient will report optimal desk set up for symptom management.  -GR     LTG 3 Progress  Ongoing  -GR     LTG 3 Progress Comments  states has not changed much but will try to shift chair when able  -GR     LTG 4  Patient will score </=30% disability on the NDI to indicate improved perceived ADL performance.  -GR     LTG 4 Progress  Ongoing  -GR       User Key  (r) = Recorded By, (t) = Taken By, (c) = Cosigned By    Initials Name Provider Type    Flaco Dsouza, PT Physical Therapist          PT Assessment/Plan     Row Name 07/01/19 1724          PT Assessment    Assessment Comments  Patient has attended 4 sessions of skilled PT for primarily neck but also lumbar pain.  She demonstraets >80 degrees of cervical rotation and is progressing with an independent HEP.  Patient states per her pain control she would like to trial the HEP on her own and return as needed.  Will proceed as per her request.  -GR        PT Plan    PT Plan Comments  Resume pending response to I HEP.  -GR       User Key  (r) = Recorded By, (t) = Taken By, (c) = Cosigned By    Initials Name Provider Type    Flaco Dsouza, PT Physical Therapist            Exercises     Row Name 07/01/19 1700 07/01/19 1600          Subjective Comments    Subjective Comments  --  Woke this morning without pain and that hasn't happened since December,  -GR        Subjective Pain    Able to rate subjective pain?  --  yes  -GR     Pre-Treatment Pain Level  --  1  -GR     Subjective Pain Comment  --  lumbar  -GR        Total Minutes    06138 - PT Therapeutic Exercise Minutes  --  30  -GR     64522 - PT Manual Therapy Minutes  15  -GR  --        Exercise 1    Exercise Name 1  --  c/spine retraction  -GR     Cueing 1  --  Demo  -GR     Sets 1  --  1  -GR     Reps 1  --  15  -GR     Time 1  --  5 sec  -GR        Exercise 2    Exercise Name 2  --  B shoulder extension  -GR     Cueing 2  --  Demo  -GR     Sets 2  --  2  -GR     Reps 2  --  10  -GR      Additional Comments  --  RTB  -GR        Exercise 4    Exercise Name 4  --  Levator stretch seated  with gentle OP  -GR     Cueing 4  --  Demo  -GR     Sets 4  --  1  -GR     Reps 4  --  3  -GR     Time 4  --  20 seconds  -GR        Exercise 5    Exercise Name 5  --  UBE  -GR     Time 5  --  4 min  -GR     Additional Comments  --  L1  -GR        Exercise 6    Exercise Name 6  --  Rows  -GR     Cueing 6  --  Demo  -GR     Sets 6  --  2  -GR     Reps 6  --  10  -GR     Additional Comments  --  RTB  -GR        Exercise 7    Exercise Name 7  --  Doorway stretch  -GR     Cueing 7  --  Demo  -GR     Sets 7  --  1  -GR     Reps 7  --  3  -GR     Time 7  --  20 sec  -GR        Exercise 8    Exercise Name 8  --  Supine B horizontal abduction  -GR     Cueing 8  --  Demo  -GR     Sets 8  --  2  -GR     Reps 8  --  10  -GR     Additional Comments  --  RTB  -GR        Exercise 9    Exercise Name 9  --  --  -GR     Reps 9  --  --  -GR     Time 9  --  --  -GR     Additional Comments  --  --  -GR        Exercise 15    Exercise Name 15  --  seated thoracic extension over foam roller  -GR     Cueing 15  --  Demo  -GR     Sets 15  --  1  -GR     Reps 15  --  10  -GR     Time 15  --  5 sec  -GR        Exercise 16    Exercise Name 16  --  SNAG rotation  -GR     Cueing 16  --  Demo  -GR     Sets 16  --  1  -GR     Reps 16  --  3  -GR     Time 16  --  10 seconds each direction  -GR       User Key  (r) = Recorded By, (t) = Taken By, (c) = Cosigned By    Initials Name Provider Type    GR Flaco Betancur, PT Physical Therapist           Manual Rx (last 36 hours)      Manual Treatments     Row Name 07/01/19 1700             Total Minutes    35369 - PT Manual Therapy Minutes  15  -GR         Manual Rx 1    Manual Rx 1 Location  cervical spine  -GR      Manual Rx 1 Type  gentle distraction, suboocipital release, rotational mobilizations  -GR         Manual Rx 2    Manual Rx 2 Location  UT/levator  -GR      Manual Rx 2 Type  STM  -GR         User Key  (r) = Recorded By, (t) = Taken By, (c) = Cosigned By    Initials Name Provider Type    GR Flaco Betancur, PT Physical Therapist                                Time Calculation:     Start Time: 1635  Stop Time: 1722  Time Calculation (min): 47 min  Total Timed Code Minutes- PT: 45 minute(s)     Therapy Charges for Today     Code Description Service Date Service Provider Modifiers Qty    [id number]  PT THER PROC EA 15 MIN 7/1/2019 Flaco Betancur, PT GP 2    [id number]  PT MANUAL THERAPY EA 15 MIN 7/1/2019 Flaco Betancur, PT GP 1                    Flaco Betancur, PT  7/1/2019

## 2019-08-23 RX ORDER — DEXTROAMPHETAMINE SACCHARATE, AMPHETAMINE ASPARTATE MONOHYDRATE, DEXTROAMPHETAMINE SULFATE AND AMPHETAMINE SULFATE 7.5; 7.5; 7.5; 7.5 MG/1; MG/1; MG/1; MG/1
30 CAPSULE, EXTENDED RELEASE ORAL EVERY MORNING
Qty: 30 CAPSULE | Refills: 0 | Status: SHIPPED | OUTPATIENT
Start: 2019-08-23 | End: 2019-09-24 | Stop reason: SDUPTHER

## 2019-09-04 RX ORDER — HYDROCODONE BITARTRATE AND ACETAMINOPHEN 10; 325 MG/1; MG/1
1 TABLET ORAL EVERY 6 HOURS PRN
Qty: 50 TABLET | Refills: 0 | Status: SHIPPED | OUTPATIENT
Start: 2019-09-04 | End: 2020-01-14 | Stop reason: SDUPTHER

## 2019-09-24 RX ORDER — DEXTROAMPHETAMINE SACCHARATE, AMPHETAMINE ASPARTATE MONOHYDRATE, DEXTROAMPHETAMINE SULFATE AND AMPHETAMINE SULFATE 7.5; 7.5; 7.5; 7.5 MG/1; MG/1; MG/1; MG/1
30 CAPSULE, EXTENDED RELEASE ORAL EVERY MORNING
Qty: 30 CAPSULE | Refills: 0 | Status: SHIPPED | OUTPATIENT
Start: 2019-09-24 | End: 2019-10-21 | Stop reason: SDUPTHER

## 2019-10-18 ENCOUNTER — OFFICE VISIT (OUTPATIENT)
Dept: INTERNAL MEDICINE | Facility: CLINIC | Age: 59
End: 2019-10-18

## 2019-10-18 VITALS
HEIGHT: 69 IN | DIASTOLIC BLOOD PRESSURE: 74 MMHG | SYSTOLIC BLOOD PRESSURE: 116 MMHG | HEART RATE: 96 BPM | WEIGHT: 187 LBS | BODY MASS INDEX: 27.7 KG/M2 | TEMPERATURE: 98.4 F | OXYGEN SATURATION: 98 %

## 2019-10-18 DIAGNOSIS — J20.8 ACUTE BRONCHITIS DUE TO OTHER SPECIFIED ORGANISMS: Primary | ICD-10-CM

## 2019-10-18 DIAGNOSIS — J01.90 ACUTE SINUSITIS, RECURRENCE NOT SPECIFIED, UNSPECIFIED LOCATION: ICD-10-CM

## 2019-10-18 PROCEDURE — 99213 OFFICE O/P EST LOW 20 MIN: CPT | Performed by: INTERNAL MEDICINE

## 2019-10-18 RX ORDER — AZELASTINE HYDROCHLORIDE AND FLUTICASONE PROPIONATE 137; 50 UG/1; UG/1
SPRAY, METERED NASAL
COMMUNITY
Start: 2019-09-23 | End: 2022-04-01

## 2019-10-18 RX ORDER — AZELASTINE 1 MG/ML
SPRAY, METERED NASAL
COMMUNITY
Start: 2019-08-23 | End: 2021-03-29

## 2019-10-18 RX ORDER — IPRATROPIUM/ALBUTEROL SULFATE 20-100 MCG
MIST INHALER (GRAM) INHALATION
COMMUNITY
Start: 2019-10-17 | End: 2021-03-29

## 2019-10-18 RX ORDER — DOXYCYCLINE 100 MG/1
100 CAPSULE ORAL EVERY 12 HOURS SCHEDULED
Qty: 20 CAPSULE | Refills: 0 | Status: SHIPPED | OUTPATIENT
Start: 2019-10-18 | End: 2019-12-06

## 2019-10-18 RX ORDER — MOMETASONE FUROATE AND FORMOTEROL FUMARATE DIHYDRATE 200; 5 UG/1; UG/1
AEROSOL RESPIRATORY (INHALATION)
COMMUNITY
Start: 2019-10-16 | End: 2021-03-29

## 2019-10-18 RX ORDER — METHYLPREDNISOLONE 4 MG/1
TABLET ORAL
Qty: 21 TABLET | Refills: 0 | Status: SHIPPED | OUTPATIENT
Start: 2019-10-18 | End: 2019-12-06

## 2019-10-18 NOTE — PROGRESS NOTES
Subjective     Angely Mathias is a 59 y.o. female who presents with   Chief Complaint   Patient presents with   • Cough   • Sore Throat       History of Present Illness     Sick for one week.  Cough with production.  SOA is associated.  No fever.  Sinus and teeth pain.  Sore throat.  Dymista, Mucinex DM  and Stahist little help.      Review of Systems   Constitutional: Negative for chills and fever.   HENT: Positive for ear pain, postnasal drip, rhinorrhea and sore throat.    Respiratory: Positive for cough and shortness of breath. Negative for wheezing.    Cardiovascular: Negative for chest pain.   Musculoskeletal: Negative for myalgias.   Skin: Negative for rash.   Neurological: Positive for headaches.       The following portions of the patient's history were reviewed and updated as appropriate: allergies, current medications and problem list.    Patient Active Problem List    Diagnosis Date Noted   • Other specified glaucoma 06/11/2019   • Chronic bronchitis (CMS/[facility]) 11/28/2018   • Attention deficit hyperactivity disorder (ADHD), predominantly inattentive type 10/28/2016     Note Last Updated: 10/28/2016     Neuropsych testing in 10/2016 with [facility].       • Lung nodule 10/18/2016     Note Last Updated: 11/28/2018     Stable 10/16-10/18     • Atopic rhinitis 02/08/2016   • Hyperlipidemia 02/08/2016     Note Last Updated: 11/28/2018     Description: 1/2014.  10 year risk ASCVD was 1.7 %; 2.1% 10 year risk.       • Chronic low back pain 02/08/2016     Note Last Updated: 2/8/2016     Description: chronic LBP.  Uses hydrocodone three times a week     • Neck pain 02/08/2016   • Persistent insomnia 02/08/2016   • Overweight 02/08/2016       Current Outpatient Medications on File Prior to Visit   Medication Sig Dispense Refill   • amphetamine-dextroamphetamine XR (ADDERALL XR) 30 MG 24 hr capsule Take 1 capsule by mouth Every Morning 30 capsule 0   • azelastine (ASTELIN) 0.1 % nasal spray      •  "bimatoprost (LUMIGAN) 0.01 % ophthalmic drops 1 drop Every Night.     • Calcium Carbonate-Vitamin D (CALCIUM + D PO) Take by mouth.     • COMBIVENT RESPIMAT  MCG/ACT inhaler      • DULERA 200-5 MCG/ACT inhaler      • DYMISTA 137-50 MCG/ACT suspension      • HYDROcodone-acetaminophen (NORCO)  MG per tablet Take 1 tablet by mouth Every 6 (Six) Hours As Needed for Moderate Pain . 50 tablet 0   • naproxen (NAPROSYN) 500 MG tablet TAKE ONE TABLET BY MOUTH TWICE A DAY WITH MEALS 60 tablet 0   • norethindrone-ethinyl estradiol (FEMHRT 1/5) 1-5 MG-MCG tablet Take by mouth.     • [DISCONTINUED] baclofen (LIORESAL) 10 MG tablet Take 1 tablet by mouth 3 (Three) Times a Day As Needed for Muscle Spasms. 30 tablet 0   • [DISCONTINUED] diclofenac (VOLTAREN) 1 % gel gel Apply 2 g topically 4 (Four) Times a Day As Needed (swelling). 100 g 0   • [DISCONTINUED] mometasone-formoterol (DULERA 100) 100-5 MCG/ACT inhaler Dulera 100-5 MCG/ACT Inhalation Aerosol; Patient Sig: Dulera 100-5 MCG/ACT Inhalation Aerosol INHALE 2 PUFFS BY MOUTH TWICE DAILY; 13; 2; 24-Jul-2015; Active     • [DISCONTINUED] Multiple Vitamin (MULTI-VITAMIN) tablet Take by mouth.       No current facility-administered medications on file prior to visit.        Objective     /74   Pulse 96   Temp 98.4 °F (36.9 °C)   Ht 175.3 cm (69.02\")   Wt 84.8 kg (187 lb)   SpO2 98%   BMI 27.60 kg/m²     Physical Exam   Constitutional: She is oriented to person, place, and time. She appears well-developed and well-nourished.   HENT:   Head: Normocephalic and atraumatic.   Right Ear: Hearing and tympanic membrane normal.   Left Ear: Hearing and tympanic membrane normal.   Mouth/Throat: No oropharyngeal exudate or posterior oropharyngeal erythema.   Cardiovascular: Normal rate, regular rhythm and normal heart sounds.   Pulmonary/Chest: Effort normal and breath sounds normal.   Neurological: She is alert and oriented to person, place, and time.   Skin: Skin is " warm and dry.   Psychiatric: She has a normal mood and affect. Her behavior is normal.       Assessment/Plan   Angely was seen today for cough and sore throat.    Diagnoses and all orders for this visit:    Acute bronchitis due to other specified organisms    Acute sinusitis, recurrence not specified, unspecified location    Other orders  -     doxycycline (MONODOX) 100 MG capsule; Take 1 capsule by mouth Every 12 (Twelve) Hours.  -     HYDROcod Polst-CPM Polst ER (TUSSIONEX PENNKINETIC ER) 10-8 MG/5ML ER suspension; Take 5 mL by mouth Every 12 (Twelve) Hours As Needed for Cough.  -     methylPREDNISolone (MEDROL, BOYD,) 4 MG tablet; Take as directed on package instructions.        Discussion    Patient presents with episode of acute bronchitis and sinusitis.  A prescription for antibiotics is provided today.  The patient is instructed to take along with Tussionex at night and a steroid boyd.  Let me know they are not feeling better over the next 3 days or if there is any change in symptoms.             Future Appointments   Date Time Provider Department Center   12/2/2019  9:10 AM [facility] None   12/6/2019 11:15 AM Cynthia Reid MD [facility] None

## 2019-10-21 RX ORDER — DEXTROAMPHETAMINE SACCHARATE, AMPHETAMINE ASPARTATE MONOHYDRATE, DEXTROAMPHETAMINE SULFATE AND AMPHETAMINE SULFATE 7.5; 7.5; 7.5; 7.5 MG/1; MG/1; MG/1; MG/1
30 CAPSULE, EXTENDED RELEASE ORAL EVERY MORNING
Qty: 30 CAPSULE | Refills: 0 | Status: SHIPPED | OUTPATIENT
Start: 2019-10-21 | End: 2019-10-22 | Stop reason: SDUPTHER

## 2019-10-22 RX ORDER — DEXTROAMPHETAMINE SACCHARATE, AMPHETAMINE ASPARTATE MONOHYDRATE, DEXTROAMPHETAMINE SULFATE AND AMPHETAMINE SULFATE 7.5; 7.5; 7.5; 7.5 MG/1; MG/1; MG/1; MG/1
30 CAPSULE, EXTENDED RELEASE ORAL EVERY MORNING
Qty: 30 CAPSULE | Refills: 0 | Status: SHIPPED | OUTPATIENT
Start: 2019-10-22 | End: 2019-11-19 | Stop reason: SDUPTHER

## 2019-11-14 ENCOUNTER — APPOINTMENT (OUTPATIENT)
Dept: WOMENS IMAGING | Facility: HOSPITAL | Age: 59
End: 2019-11-14

## 2019-11-14 PROCEDURE — 77067 SCR MAMMO BI INCL CAD: CPT | Performed by: RADIOLOGY

## 2019-11-19 RX ORDER — DEXTROAMPHETAMINE SACCHARATE, AMPHETAMINE ASPARTATE MONOHYDRATE, DEXTROAMPHETAMINE SULFATE AND AMPHETAMINE SULFATE 7.5; 7.5; 7.5; 7.5 MG/1; MG/1; MG/1; MG/1
30 CAPSULE, EXTENDED RELEASE ORAL EVERY MORNING
Qty: 30 CAPSULE | Refills: 0 | Status: SHIPPED | OUTPATIENT
Start: 2019-11-19 | End: 2019-12-26 | Stop reason: SDUPTHER

## 2019-12-02 DIAGNOSIS — Z00.00 HEALTH CARE MAINTENANCE: Primary | ICD-10-CM

## 2019-12-03 LAB
ALBUMIN SERPL-MCNC: 4.7 G/DL (ref 3.5–5.2)
ALBUMIN/GLOB SERPL: 2.2 G/DL
ALP SERPL-CCNC: 55 U/L (ref 39–117)
ALT SERPL-CCNC: 24 U/L (ref 1–33)
APPEARANCE UR: CLEAR
AST SERPL-CCNC: 25 U/L (ref 1–32)
BACTERIA #/AREA URNS HPF: NORMAL /HPF
BASOPHILS # BLD AUTO: 0.05 10*3/MM3 (ref 0–0.2)
BASOPHILS NFR BLD AUTO: 0.9 % (ref 0–1.5)
BILIRUB SERPL-MCNC: 0.5 MG/DL (ref 0.2–1.2)
BILIRUB UR QL STRIP: NEGATIVE
BUN SERPL-MCNC: 20 MG/DL (ref 8–23)
BUN/CREAT SERPL: 18.7 (ref 7–25)
CALCIUM SERPL-MCNC: 9.6 MG/DL (ref 8.6–10.5)
CHLORIDE SERPL-SCNC: 102 MMOL/L (ref 98–107)
CHOLEST SERPL-MCNC: 273 MG/DL (ref 0–200)
CO2 SERPL-SCNC: 27.9 MMOL/L (ref 22–29)
COLOR UR: YELLOW
CREAT SERPL-MCNC: 1.07 MG/DL (ref 0.57–1)
EOSINOPHIL # BLD AUTO: 0.24 10*3/MM3 (ref 0–0.4)
EOSINOPHIL NFR BLD AUTO: 4.3 % (ref 0.3–6.2)
EPI CELLS #/AREA URNS HPF: NORMAL /HPF
ERYTHROCYTE [DISTWIDTH] IN BLOOD BY AUTOMATED COUNT: 12.2 % (ref 12.3–15.4)
GLOBULIN SER CALC-MCNC: 2.1 GM/DL
GLUCOSE SERPL-MCNC: 90 MG/DL (ref 65–99)
GLUCOSE UR QL: NEGATIVE
HCT VFR BLD AUTO: 40.3 % (ref 34–46.6)
HDLC SERPL-MCNC: 68 MG/DL (ref 40–60)
HGB BLD-MCNC: 13.3 G/DL (ref 12–15.9)
HGB UR QL STRIP: ABNORMAL
IMM GRANULOCYTES # BLD AUTO: 0.02 10*3/MM3 (ref 0–0.05)
IMM GRANULOCYTES NFR BLD AUTO: 0.4 % (ref 0–0.5)
KETONES UR QL STRIP: NEGATIVE
LDLC SERPL CALC-MCNC: 174 MG/DL (ref 0–100)
LEUKOCYTE ESTERASE UR QL STRIP: NEGATIVE
LYMPHOCYTES # BLD AUTO: 1.59 10*3/MM3 (ref 0.7–3.1)
LYMPHOCYTES NFR BLD AUTO: 28.2 % (ref 19.6–45.3)
MCH RBC QN AUTO: 31.2 PG (ref 26.6–33)
MCHC RBC AUTO-ENTMCNC: 33 G/DL (ref 31.5–35.7)
MCV RBC AUTO: 94.6 FL (ref 79–97)
MICRO URNS: ABNORMAL
MONOCYTES # BLD AUTO: 0.49 10*3/MM3 (ref 0.1–0.9)
MONOCYTES NFR BLD AUTO: 8.7 % (ref 5–12)
MUCOUS THREADS URNS QL MICRO: PRESENT /HPF
NEUTROPHILS # BLD AUTO: 3.24 10*3/MM3 (ref 1.7–7)
NEUTROPHILS NFR BLD AUTO: 57.5 % (ref 42.7–76)
NITRITE UR QL STRIP: NEGATIVE
NRBC BLD AUTO-RTO: 0 /100 WBC (ref 0–0.2)
PH UR STRIP: 7 [PH] (ref 5–7.5)
PLATELET # BLD AUTO: 296 10*3/MM3 (ref 140–450)
POTASSIUM SERPL-SCNC: 4.4 MMOL/L (ref 3.5–5.2)
PROT SERPL-MCNC: 6.8 G/DL (ref 6–8.5)
PROT UR QL STRIP: NEGATIVE
RBC # BLD AUTO: 4.26 10*6/MM3 (ref 3.77–5.28)
RBC #/AREA URNS HPF: NORMAL /HPF
SODIUM SERPL-SCNC: 141 MMOL/L (ref 136–145)
SP GR UR: 1.02 (ref 1–1.03)
TRIGL SERPL-MCNC: 154 MG/DL (ref 0–150)
TSH SERPL DL<=0.005 MIU/L-ACNC: 3 UIU/ML (ref 0.27–4.2)
URINALYSIS REFLEX: ABNORMAL
UROBILINOGEN UR STRIP-MCNC: 0.2 MG/DL (ref 0.2–1)
VLDLC SERPL CALC-MCNC: 30.8 MG/DL
WBC # BLD AUTO: 5.63 10*3/MM3 (ref 3.4–10.8)
WBC #/AREA URNS HPF: NORMAL /HPF

## 2019-12-06 ENCOUNTER — OFFICE VISIT (OUTPATIENT)
Dept: INTERNAL MEDICINE | Facility: CLINIC | Age: 59
End: 2019-12-06

## 2019-12-06 VITALS
SYSTOLIC BLOOD PRESSURE: 114 MMHG | WEIGHT: 190 LBS | DIASTOLIC BLOOD PRESSURE: 72 MMHG | OXYGEN SATURATION: 98 % | BODY MASS INDEX: 28.14 KG/M2 | HEIGHT: 69 IN | HEART RATE: 83 BPM

## 2019-12-06 DIAGNOSIS — G89.29 CHRONIC BILATERAL LOW BACK PAIN WITH SCIATICA, SCIATICA LATERALITY UNSPECIFIED: ICD-10-CM

## 2019-12-06 DIAGNOSIS — Z00.00 WELL ADULT EXAM: Primary | ICD-10-CM

## 2019-12-06 DIAGNOSIS — M54.40 CHRONIC BILATERAL LOW BACK PAIN WITH SCIATICA, SCIATICA LATERALITY UNSPECIFIED: ICD-10-CM

## 2019-12-06 DIAGNOSIS — F90.0 ATTENTION DEFICIT HYPERACTIVITY DISORDER (ADHD), PREDOMINANTLY INATTENTIVE TYPE: ICD-10-CM

## 2019-12-06 DIAGNOSIS — E78.5 HYPERLIPIDEMIA, UNSPECIFIED HYPERLIPIDEMIA TYPE: ICD-10-CM

## 2019-12-06 DIAGNOSIS — J42 CHRONIC BRONCHITIS, UNSPECIFIED CHRONIC BRONCHITIS TYPE (HCC): ICD-10-CM

## 2019-12-06 PROCEDURE — 99396 PREV VISIT EST AGE 40-64: CPT | Performed by: INTERNAL MEDICINE

## 2019-12-06 RX ORDER — BIMATOPROST 0.3 MG/ML
SOLUTION/ DROPS OPHTHALMIC
COMMUNITY
Start: 2019-11-14 | End: 2019-12-06

## 2019-12-06 RX ORDER — ESTRADIOL 10 UG/1
INSERT VAGINAL
COMMUNITY
End: 2021-03-29

## 2019-12-06 NOTE — PROGRESS NOTES
Subjective     Angely Mathias is a 59 y.o. female who presents for a complete physical exam.      History of Present Illness     HLD.  Cholesterol has increased.  Not as good with diet recently.    ADD.  Good with Adderall XR.  Chronic LBP.  Prn Norco is effective for the pain.   Chronic bronchitis.  She is maintained on Dulera and is overall doing well.     Review of Systems   Constitutional: Negative.    HENT: Negative.    Eyes: Negative.    Respiratory: Negative.    Cardiovascular: Negative.    Gastrointestinal: Negative.    Endocrine: Negative.    Genitourinary: Negative.    Musculoskeletal: Negative.    Skin: Negative.    Allergic/Immunologic: Negative.    Neurological: Negative.    Hematological: Negative.    Psychiatric/Behavioral: Negative.        The following portions of the patient's history were reviewed and updated as appropriate: allergies, current medications, past family history, past medical history, past social history, past surgical history and problem list.  Health maintenance tab was reviewed and updated with the patient.       Patient Active Problem List    Diagnosis Date Noted   • Other specified glaucoma 06/11/2019   • Chronic bronchitis (CMS/HCC) 11/28/2018   • Attention deficit hyperactivity disorder (ADHD), predominantly inattentive type 10/28/2016     Note Last Updated: 10/28/2016     Neuropsych testing in 10/2016 with [facility].       • Lung nodule 10/18/2016     Note Last Updated: 11/28/2018     Stable 10/16-10/18     • Atopic rhinitis 02/08/2016   • Hyperlipidemia 02/08/2016     Note Last Updated: 11/28/2018     Description: 1/2014.  10 year risk ASCVD was 1.7 %; 2.1% 10 year risk.       • Chronic low back pain 02/08/2016     Note Last Updated: 2/8/2016     Description: chronic LBP.  Uses hydrocodone three times a week     • Neck pain 02/08/2016   • Persistent insomnia 02/08/2016   • Overweight 02/08/2016       Past Medical History:   Diagnosis Date   • Acute bronchitis    •  Acute pain    • ADHD (attention deficit hyperactivity disorder) 1988    years ago. worse w/ job type change   • Allergic     since i was little   • Blood tests for routine general physical examination    • Cholelithiasis     resolved with GB removal   • Diarrhea    • Glaucoma     treated w/ eye drops   • Headache     ocular and allergy induced   • Hyperlipidemia 2016    noted on past lab work   • Low back pain     occasional chronic   • Microscopic hematuria    • Obesity     at times   • Visual impairment 1965    wear contacts/glasses       Past Surgical History:   Procedure Laterality Date   • ABDOMINOPLASTY     • BREAST SURGERY      Enlargement Procedure   • CHOLECYSTECTOMY     • COLONOSCOPY     • COSMETIC SURGERY      breast aug 1989 and    • DIAGNOSTIC LAPAROSCOPY EXPLORATORY LAPAROTOMY     • DILATATION AND CURETTAGE     • EYE SURGERY      RK    • OTHER SURGICAL HISTORY      Vaginal Sling Operation for Stress Incontinence   • TUBAL ABDOMINAL LIGATION     • WRIST SURGERY         Family History   Problem Relation Age of Onset   • Breast cancer Sister    • Bipolar disorder Brother    • Diabetes Brother         type 2 DM   • Diabetes Brother    • Alcohol abuse Father         D.    • Cancer Sister         Breast ca    • Diabetes Maternal Grandfather         DX in his 80's   • Early death Brother          in sleep at 48-   • Hearing loss Mother         L ear   • Miscarriages / Stillbirths Mother         child number 4   • Vision loss Mother         cataracts replaced       Social History     Socioeconomic History   • Marital status:      Spouse name: Not on file   • Number of children: Not on file   • Years of education: Not on file   • Highest education level: Not on file   Tobacco Use   • Smoking status: Never Smoker   • Smokeless tobacco: Never Used   • Tobacco comment: none   Substance and Sexual Activity   • Alcohol use: Yes     Types: 2 Glasses of  wine, 2 Standard drinks or equivalent per week   • Drug use: No   • Sexual activity: Yes     Partners: Male     Birth control/protection: Post-menopausal, Surgical     Comment: tubal ligation at 29yo., natural menopause at 38       Current Outpatient Medications on File Prior to Visit   Medication Sig Dispense Refill   • amphetamine-dextroamphetamine XR (ADDERALL XR) 30 MG 24 hr capsule Take 1 capsule by mouth Every Morning 30 capsule 0   • azelastine (ASTELIN) 0.1 % nasal spray      • bimatoprost (LUMIGAN) 0.01 % ophthalmic drops 1 drop Every Night.     • Calcium Carbonate-Vitamin D (CALCIUM + D PO) Take by mouth.     • COMBIVENT RESPIMAT  MCG/ACT inhaler      • DULERA 200-5 MCG/ACT inhaler      • DYMISTA 137-50 MCG/ACT suspension      • estradiol (VAGIFEM) 10 MCG tablet vaginal tablet      • HYDROcod Polst-CPM Polst ER (TUSSIONEX PENNKINETIC ER) 10-8 MG/5ML ER suspension Take 5 mL by mouth Every 12 (Twelve) Hours As Needed for Cough. 115 mL 0   • HYDROcodone-acetaminophen (NORCO)  MG per tablet Take 1 tablet by mouth Every 6 (Six) Hours As Needed for Moderate Pain . 50 tablet 0   • naproxen (NAPROSYN) 500 MG tablet TAKE ONE TABLET BY MOUTH TWICE A DAY WITH MEALS 60 tablet 0   • norethindrone-ethinyl estradiol (FEMHRT 1/5) 1-5 MG-MCG tablet Take by mouth.     • [DISCONTINUED] bimatoprost (LUMIGAN) 0.03 % ophthalmic drops      • [DISCONTINUED] doxycycline (MONODOX) 100 MG capsule Take 1 capsule by mouth Every 12 (Twelve) Hours. 20 capsule 0   • [DISCONTINUED] methylPREDNISolone (MEDROL, BOYD,) 4 MG tablet Take as directed on package instructions. 21 tablet 0     No current facility-administered medications on file prior to visit.        Allergies   Allergen Reactions   • Contrast Dye Shortness Of Breath   • Pistachio Nut (Diagnostic) Anaphylaxis   • Codeine Itching   • Morphine And Related Itching and Rash   • Sulfa Antibiotics Itching and Rash       Immunization History   Administered Date(s)  "Administered   • Flu Vaccine Intradermal Quad 18-64YR 10/11/2018   • Hepatitis A 04/30/2018, 11/15/2018   • Tdap 01/24/2014       Objective     /72   Pulse 83   Ht 175.3 cm (69.02\")   Wt 86.2 kg (190 lb)   SpO2 98%   BMI 28.05 kg/m²     Physical Exam   Constitutional: She is oriented to person, place, and time. She appears well-developed and well-nourished.   HENT:   Head: Normocephalic and atraumatic.   Right Ear: Hearing, tympanic membrane and external ear normal.   Left Ear: Hearing, tympanic membrane and external ear normal.   Nose: Nose normal.   Mouth/Throat: Oropharynx is clear and moist.   Neck: Neck supple. No thyromegaly present.   Cardiovascular: Normal rate, regular rhythm and normal heart sounds.   No murmur heard.  Pulmonary/Chest: Effort normal and breath sounds normal. Right breast exhibits no mass. Left breast exhibits no mass.   Abdominal: Soft. She exhibits no distension. There is no hepatosplenomegaly. There is no tenderness.   Genitourinary: No breast tenderness.   Lymphadenopathy:     She has no cervical adenopathy.   Neurological: She is alert and oriented to person, place, and time.   Skin: Skin is warm and dry.   Psychiatric: She has a normal mood and affect. Her speech is normal and behavior is normal. Judgment and thought content normal. Cognition and memory are normal.       Assessment/Plan   Angely was seen today for annual exam.    Diagnoses and all orders for this visit:    Well adult exam    Chronic bronchitis, unspecified chronic bronchitis type (CMS/HCC)    Hyperlipidemia, unspecified hyperlipidemia type    Attention deficit hyperactivity disorder (ADHD), predominantly inattentive type    Chronic bilateral low back pain with sciatica, sciatica laterality unspecified        Discussion    Patient presents today for a CPE.      Patient follows a healthy diet.   Patient follows an adequate exercise regimen. Mammogram is up to date.   Colon cancer screening is up to date.   Pap " smears are performed by the patient's gynecologist.    HLD.  Increase attention to diet and recheck three months.   ADD.  The patient will continue current regimen.    Update contract.   Chronic LBP.   Update contract for prn hydrocodone.    I have recommended that the patient get the following immunizations:  Shingrix.      Health Maintenance   Topic Date Due   • ZOSTER VACCINE (1 of 2) 05/02/2008   • ANNUAL PHYSICAL  11/29/2019   • MAMMOGRAM  09/01/2020   • LIPID PANEL  12/02/2020   • PAP SMEAR  09/01/2021   • COLONOSCOPY  08/03/2022   • TDAP/TD VACCINES (2 - Td) 01/24/2024   • HEPATITIS C SCREENING  Completed   • INFLUENZA VACCINE  Completed            No future appointments.

## 2019-12-26 DIAGNOSIS — F90.0 ATTENTION DEFICIT HYPERACTIVITY DISORDER (ADHD), PREDOMINANTLY INATTENTIVE TYPE: Primary | ICD-10-CM

## 2019-12-26 RX ORDER — DEXTROAMPHETAMINE SACCHARATE, AMPHETAMINE ASPARTATE MONOHYDRATE, DEXTROAMPHETAMINE SULFATE AND AMPHETAMINE SULFATE 7.5; 7.5; 7.5; 7.5 MG/1; MG/1; MG/1; MG/1
30 CAPSULE, EXTENDED RELEASE ORAL EVERY MORNING
Qty: 30 CAPSULE | Refills: 0 | Status: SHIPPED | OUTPATIENT
Start: 2019-12-26 | End: 2020-01-24 | Stop reason: SDUPTHER

## 2020-01-14 DIAGNOSIS — M54.50 LOW BACK PAIN, UNSPECIFIED BACK PAIN LATERALITY, UNSPECIFIED CHRONICITY, UNSPECIFIED WHETHER SCIATICA PRESENT: Primary | ICD-10-CM

## 2020-01-14 RX ORDER — HYDROCODONE BITARTRATE AND ACETAMINOPHEN 10; 325 MG/1; MG/1
1 TABLET ORAL EVERY 6 HOURS PRN
Qty: 50 TABLET | Refills: 0 | Status: SHIPPED | OUTPATIENT
Start: 2020-01-14 | End: 2020-03-24 | Stop reason: SDUPTHER

## 2020-01-17 ENCOUNTER — OFFICE VISIT (OUTPATIENT)
Dept: INTERNAL MEDICINE | Facility: CLINIC | Age: 60
End: 2020-01-17

## 2020-01-17 VITALS
BODY MASS INDEX: 28.58 KG/M2 | HEIGHT: 69 IN | OXYGEN SATURATION: 98 % | HEART RATE: 76 BPM | DIASTOLIC BLOOD PRESSURE: 86 MMHG | WEIGHT: 193 LBS | SYSTOLIC BLOOD PRESSURE: 128 MMHG

## 2020-01-17 DIAGNOSIS — M54.41 ACUTE RIGHT-SIDED LOW BACK PAIN WITH RIGHT-SIDED SCIATICA: Primary | ICD-10-CM

## 2020-01-17 PROCEDURE — 99213 OFFICE O/P EST LOW 20 MIN: CPT | Performed by: INTERNAL MEDICINE

## 2020-01-17 RX ORDER — BIMATOPROST 0.3 MG/ML
SOLUTION/ DROPS OPHTHALMIC
COMMUNITY
Start: 2019-12-17 | End: 2021-03-05

## 2020-01-17 RX ORDER — METHYLPREDNISOLONE 4 MG/1
TABLET ORAL
Qty: 21 TABLET | Refills: 0 | Status: SHIPPED | OUTPATIENT
Start: 2020-01-17 | End: 2020-07-02 | Stop reason: SDUPTHER

## 2020-01-17 NOTE — PROGRESS NOTES
Subjective     Angely Mahtias is a 59 y.o. female who presents with   Chief Complaint   Patient presents with   • Back Pain       History of Present Illness     C/o back pain.  Started around Thanksgiving.  Right sided Low back and radiates to the right knee.  Naprosyn no help.  Norco is helpful.  Daily bother.  Standing makes worse.  Acute on LBP.  Known DDD.  No weakness.  No tingling.  I also saw her for LBP in June of 2019.      Review of Systems   Constitutional: Negative for fever.   Cardiovascular: Negative for chest pain.   Gastrointestinal: Negative for abdominal pain.   Genitourinary: Negative for dysuria and pelvic pain.   Musculoskeletal: Positive for back pain.   Neurological: Negative for weakness, numbness and headaches.       The following portions of the patient's history were reviewed and updated as appropriate: allergies, current medications and problem list.    Patient Active Problem List    Diagnosis Date Noted   • Other specified glaucoma 06/11/2019   • Chronic bronchitis (CMS/HCC) 11/28/2018   • Attention deficit hyperactivity disorder (ADHD), predominantly inattentive type 10/28/2016     Note Last Updated: 10/28/2016     Neuropsych testing in 10/2016 with [facility].       • Lung nodule 10/18/2016     Note Last Updated: 11/28/2018     Stable 10/16-10/18     • Atopic rhinitis 02/08/2016   • Hyperlipidemia 02/08/2016     Note Last Updated: 11/28/2018     Description: 1/2014.  10 year risk ASCVD was 1.7 %; 2.1% 10 year risk.       • Chronic low back pain 02/08/2016     Note Last Updated: 2/8/2016     Description: chronic LBP.  Uses hydrocodone three times a week     • Neck pain 02/08/2016   • Persistent insomnia 02/08/2016   • Overweight 02/08/2016       Current Outpatient Medications on File Prior to Visit   Medication Sig Dispense Refill   • amphetamine-dextroamphetamine XR (ADDERALL XR) 30 MG 24 hr capsule Take 1 capsule by mouth Every Morning 30 capsule 0   • azelastine (ASTELIN)  "0.1 % nasal spray      • bimatoprost (LUMIGAN) 0.01 % ophthalmic drops 1 drop Every Night.     • bimatoprost (LUMIGAN) 0.03 % ophthalmic drops      • Calcium Carbonate-Vitamin D (CALCIUM + D PO) Take by mouth.     • COMBIVENT RESPIMAT  MCG/ACT inhaler      • DULERA 200-5 MCG/ACT inhaler      • DYMISTA 137-50 MCG/ACT suspension      • estradiol (VAGIFEM) 10 MCG tablet vaginal tablet      • HYDROcod Polst-CPM Polst ER (TUSSIONEX PENNKINETIC ER) 10-8 MG/5ML ER suspension Take 5 mL by mouth Every 12 (Twelve) Hours As Needed for Cough. 115 mL 0   • HYDROcodone-acetaminophen (NORCO)  MG per tablet Take 1 tablet by mouth Every 6 (Six) Hours As Needed for Moderate Pain . 50 tablet 0   • naproxen (NAPROSYN) 500 MG tablet TAKE ONE TABLET BY MOUTH TWICE A DAY WITH MEALS 60 tablet 0   • norethindrone-ethinyl estradiol (FEMHRT 1/5) 1-5 MG-MCG tablet Take by mouth.       No current facility-administered medications on file prior to visit.        Objective     /86   Pulse 76   Ht 175.3 cm (69.02\")   Wt 87.5 kg (193 lb)   SpO2 98%   BMI 28.49 kg/m²     Physical Exam   Constitutional: She is oriented to person, place, and time. She appears well-developed and well-nourished.   HENT:   Head: Normocephalic and atraumatic.   Pulmonary/Chest: Effort normal.   Neurological: She is alert and oriented to person, place, and time. She has normal strength. No sensory deficit.   Reflex Scores:       Patellar reflexes are 2+ on the right side and 2+ on the left side.       Achilles reflexes are 1+ on the right side and 1+ on the left side.  Psychiatric: She has a normal mood and affect. Her behavior is normal.       Assessment/Plan   Angely was seen today for back pain.    Diagnoses and all orders for this visit:    Acute right-sided low back pain with right-sided sciatica  -     MRI Lumbar Spine Without Contrast; Future  -     Ambulatory Referral to Physical Therapy Evaluate and treat    Other orders  -     " methylPREDNISolone (MEDROL, BOYD,) 4 MG tablet; Take as directed on package instructions.        Discussion    Patient presents with acute on chronic LBP. I discussed with the patient a trial of conservative management with:   physical therapy and medrol dose boyd.  Let me know if not feeling better over the next several weeks or if there is any change in symptoms.  Because her LBP is a chronic ongoing issue, I have also ordered a f/u MRI.               No future appointments.

## 2020-01-23 ENCOUNTER — HOSPITAL ENCOUNTER (OUTPATIENT)
Dept: MRI IMAGING | Facility: HOSPITAL | Age: 60
Discharge: HOME | End: 2020-01-23
Admitting: INTERNAL MEDICINE

## 2020-01-23 DIAGNOSIS — M54.41 ACUTE RIGHT-SIDED LOW BACK PAIN WITH RIGHT-SIDED SCIATICA: ICD-10-CM

## 2020-01-23 PROCEDURE — 72148 MRI LUMBAR SPINE W/O DYE: CPT

## 2020-01-24 DIAGNOSIS — F90.0 ATTENTION DEFICIT HYPERACTIVITY DISORDER (ADHD), PREDOMINANTLY INATTENTIVE TYPE: ICD-10-CM

## 2020-01-24 RX ORDER — DEXTROAMPHETAMINE SACCHARATE, AMPHETAMINE ASPARTATE MONOHYDRATE, DEXTROAMPHETAMINE SULFATE AND AMPHETAMINE SULFATE 7.5; 7.5; 7.5; 7.5 MG/1; MG/1; MG/1; MG/1
30 CAPSULE, EXTENDED RELEASE ORAL EVERY MORNING
Qty: 30 CAPSULE | Refills: 0 | Status: SHIPPED | OUTPATIENT
Start: 2020-01-24 | End: 2020-02-25 | Stop reason: SDUPTHER

## 2020-01-28 DIAGNOSIS — M48.061 SPINAL STENOSIS OF LUMBAR REGION, UNSPECIFIED WHETHER NEUROGENIC CLAUDICATION PRESENT: ICD-10-CM

## 2020-01-28 DIAGNOSIS — M54.41 ACUTE RIGHT-SIDED LOW BACK PAIN WITH RIGHT-SIDED SCIATICA: Primary | ICD-10-CM

## 2020-01-28 DIAGNOSIS — M51.26 LUMBAR DISC HERNIATION: ICD-10-CM

## 2020-01-31 ENCOUNTER — HOSPITAL ENCOUNTER (OUTPATIENT)
Dept: PHYSICAL THERAPY | Facility: HOSPITAL | Age: 60
Setting detail: THERAPIES SERIES
Discharge: HOME | End: 2020-01-31

## 2020-01-31 DIAGNOSIS — M79.604 PAIN OF RIGHT LOWER EXTREMITY: ICD-10-CM

## 2020-01-31 DIAGNOSIS — R52 PAIN AGGRAVATED BY STANDING: ICD-10-CM

## 2020-01-31 DIAGNOSIS — R52 PAIN AGGRAVATED BY WALKING: ICD-10-CM

## 2020-01-31 DIAGNOSIS — M54.41 ACUTE RIGHT-SIDED LOW BACK PAIN WITH RIGHT-SIDED SCIATICA: Primary | ICD-10-CM

## 2020-01-31 PROCEDURE — 97161 PT EVAL LOW COMPLEX 20 MIN: CPT | Performed by: PHYSICAL THERAPIST

## 2020-01-31 PROCEDURE — 97110 THERAPEUTIC EXERCISES: CPT | Performed by: PHYSICAL THERAPIST

## 2020-02-01 ENCOUNTER — APPOINTMENT (OUTPATIENT)
Dept: MRI IMAGING | Facility: HOSPITAL | Age: 60
End: 2020-02-01

## 2020-02-14 ENCOUNTER — HOSPITAL ENCOUNTER (OUTPATIENT)
Dept: PHYSICAL THERAPY | Facility: HOSPITAL | Age: 60
Setting detail: THERAPIES SERIES
Discharge: HOME | End: 2020-02-14

## 2020-02-14 DIAGNOSIS — R52 PAIN AGGRAVATED BY WALKING: ICD-10-CM

## 2020-02-14 DIAGNOSIS — M54.41 ACUTE RIGHT-SIDED LOW BACK PAIN WITH RIGHT-SIDED SCIATICA: Primary | ICD-10-CM

## 2020-02-14 DIAGNOSIS — M79.604 PAIN OF RIGHT LOWER EXTREMITY: ICD-10-CM

## 2020-02-14 PROCEDURE — 97140 MANUAL THERAPY 1/> REGIONS: CPT

## 2020-02-14 PROCEDURE — 97110 THERAPEUTIC EXERCISES: CPT

## 2020-02-14 NOTE — THERAPY TREATMENT NOTE
Outpatient Physical Therapy Ortho Treatment Note  [facility]     Patient Name: Angely Mathias  : 1960  MRN: [id number]  Today's Date: 2020      Visit Date: 2020    Visit Dx:    ICD-10-CM ICD-9-CM   1. Acute right-sided low back pain with right-sided sciatica M54.41 724.2     724.3   2. Pain of right lower extremity M79.604 729.5   3. Pain aggravated by walking R52 780.96       Patient Active Problem List   Diagnosis   • Atopic rhinitis   • Hyperlipidemia   • Chronic low back pain   • Neck pain   • Persistent insomnia   • Overweight   • Lung nodule   • Attention deficit hyperactivity disorder (ADHD), predominantly inattentive type   • Chronic bronchitis (CMS/HCC)   • Other specified glaucoma        Past Medical History:   Diagnosis Date   • Acute bronchitis    • Acute pain    • ADHD (attention deficit hyperactivity disorder)     years ago. worse w/ job type change   • Allergic     since i was little   • Blood tests for routine general physical examination    • Cholelithiasis     resolved with GB removal   • Diarrhea    • Glaucoma     treated w/ eye drops   • Headache     ocular and allergy induced   • Hyperlipidemia     noted on past lab work   • Low back pain     occasional chronic   • Microscopic hematuria    • Obesity     at times   • Visual impairment 1965    wear contacts/glasses        Past Surgical History:   Procedure Laterality Date   • ABDOMINOPLASTY     • BREAST SURGERY      Enlargement Procedure   • CHOLECYSTECTOMY     • COLONOSCOPY     • COSMETIC SURGERY      breast aug 1989 and    • DIAGNOSTIC LAPAROSCOPY EXPLORATORY LAPAROTOMY     • DILATATION AND CURETTAGE     • EYE SURGERY  1996   • OTHER SURGICAL HISTORY      Vaginal Sling Operation for Stress Incontinence   • TUBAL ABDOMINAL LIGATION     • WRIST SURGERY                         PT Assessment/Plan     Row Name 20 1200          PT Assessment    Assessment Comments  Ms.  Mey reports good tolerance for initial HEP, continues to have pain into RLE, more proximal as she has not been standing long today. Performed manual lumbar traction, R LAD, and STM R hip ER with good tolerance. DIscussed the potential for mechanical lumbar traction next session if good tolerance to manual therapy this date.   -RS        PT Plan    PT Plan Comments  Consider mechanical traction, update HEP.  -RS       User Key  (r) = Recorded By, (t) = Taken By, (c) = Cosigned By    Initials Name Provider Type    RS Allie Hu, PT Physical Therapist            OP Exercises     Row Name 02/14/20 1100 02/14/20 1000          Subjective Comments    Subjective Comments  --  Hasn't felt different since eval, not been standing much today so only hurting in the hip not ads much down the leg.  -RS        Subjective Pain    Able to rate subjective pain?  --  yes  -RS     Pre-Treatment Pain Level  --  4  -RS        Total Minutes    84092 - PT Therapeutic Exercise Minutes  --  30  -RS     41744 - PT Manual Therapy Minutes  12  -RS  --        Exercise 1    Exercise Name 1  --  TrA isometrics  -RS     Cueing 1  --  Verbal;Demo  -RS     Reps 1  --  10  -RS     Time 1  --  10 sec  -RS     Additional Comments  --  HL  -RS        Exercise 2    Exercise Name 2  --  SKTC  -RS     Cueing 2  --  Verbal;Demo  -RS     Reps 2  --  10  -RS     Time 2  --  5 sec  -RS        Exercise 3    Exercise Name 3  --  Lower trunk rotation  -RS     Cueing 3  --  Verbal;Demo  -RS     Reps 3  --  10  -RS     Additional Comments  --  within painfree range  -RS        Exercise 4    Exercise Name 4  --  sidelying clamshell  -RS     Cueing 4  --  Verbal;Demo  -RS     Reps 4  --  15  -RS        Exercise 5    Exercise Name 5  --  Bridges  -RS     Cueing 5  --  Verbal;Demo  -RS     Reps 5  --  10  -RS     Time 5  --  3s  -RS     Additional Comments  --  within painfree range  -RS        Exercise 6    Exercise Name 6  --  Piriformis stretch supine/sitting   -RS     Cueing 6  --  Verbal;Demo  -RS     Reps 6  --  2  -RS     Time 6  --  20 sec  -RS        Exercise 7    Exercise Name 7  --  Nustep  -RS     Cueing 7  --  Verbal  -RS     Time 7  --  5 min  -RS        Exercise 8    Exercise Name 8  --  PPT with march  -RS     Cueing 8  --  Verbal  -RS     Reps 8  --  10  -RS       User Key  (r) = Recorded By, (t) = Taken By, (c) = Cosigned By    Initials Name Provider Type    RS Allie Hu PT Physical Therapist                      Manual Rx (last 36 hours)      Manual Treatments     Row Name 02/14/20 1100             Total Minutes    93716 - PT Manual Therapy Minutes  12  -RS         Manual Rx 1    Manual Rx 1 Location  lumbar traction with belt in HL  -RS         Manual Rx 2    Manual Rx 2 Location  R LAD  -RS         Manual Rx 3    Manual Rx 3 Location  STM R hip ER  -RS        User Key  (r) = Recorded By, (t) = Taken By, (c) = Cosigned By    Initials Name Provider Type    RS Allie Hu PT Physical Therapist                             Time Calculation:   Start Time: 1045  Stop Time: 1130  Time Calculation (min): 45 min  Therapy Charges for Today     Code Description Service Date Service Provider Modifiers Qty    38396997951  PT THER PROC EA 15 MIN 2/14/2020 Allie Hu, PT GP 2    [id number] HC PT MANUAL THERAPY EA 15 MIN 2/14/2020 Allie Hu, PT GP 1                    Allie Hu PT  2/14/2020

## 2020-02-19 ENCOUNTER — OFFICE VISIT (OUTPATIENT)
Dept: NEUROSURGERY | Facility: CLINIC | Age: 60
End: 2020-02-19

## 2020-02-19 VITALS
HEART RATE: 103 BPM | HEIGHT: 69 IN | DIASTOLIC BLOOD PRESSURE: 78 MMHG | SYSTOLIC BLOOD PRESSURE: 132 MMHG | BODY MASS INDEX: 29.36 KG/M2 | WEIGHT: 198.2 LBS

## 2020-02-19 DIAGNOSIS — M51.16 INTERVERTEBRAL DISC DISORDER WITH RADICULOPATHY OF LUMBAR REGION: Primary | ICD-10-CM

## 2020-02-19 PROCEDURE — 99203 OFFICE O/P NEW LOW 30 MIN: CPT | Performed by: NURSE PRACTITIONER

## 2020-02-19 NOTE — PROGRESS NOTES
Subjective   Patient ID: Angely Mathias is a 59 y.o. female is being seen for consultation today at the request of Cynthia Reid MD for back and R leg. She denies any recent cause or injury. She states she did have a fracture T12 in 1993. She tried a MDP which helped only while taking it. She is currently going to PT with no relief. She takes Naproxen 500 mg, Norco 5/325 prn for pain.     Ms. Mathias is being seen today for neurosurgical opinion at the request of her primary care provider Dr. Jane Reid.  Notes from today's visit will be forwarded upon completion.  This is a very pleasant 59-year-old female.  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  She began having worsening pain in her low back with radiation into the right lateral hip, right lateral thigh, and right lateral knee.  The symptoms came on after she had traveled to [place].  Standing and walking worsens the pain.  She finds that when her symptoms start she has to find a chair rather quickly or else it will take longer for the pain symptoms to resolve.  She denies any bowel or bladder incontinence issues.  She has taken a Medrol Dosepak with only mild relief in symptoms.  A while after finishing the Dosepak the symptoms returned.  She is also been trying physical therapy with no lasting improvement.  The patient denies any weakness in her legs or bowel/bladder incontinence.  She denies any recent injury or gait disturbance.  She has had no recent fall or trauma.    Back Pain   This is a chronic problem. The problem occurs intermittently. The problem has been gradually worsening since onset. The pain is present in the lumbar spine. The quality of the pain is described as stabbing, shooting and burning. The pain radiates to the right thigh and right knee. The pain is at a severity of 3/10. The pain is mild. The pain is the same all the time. The symptoms are aggravated by standing and  position. Associated symptoms include leg pain. Pertinent negatives include no bladder incontinence or bowel incontinence. Treatments tried: PT, MDP  The treatment provided no relief.   Leg Pain    There was no injury mechanism. The pain is present in the right thigh, right hip and right knee. The quality of the pain is described as aching. The pain is at a severity of 3/10. The pain is mild. Treatments tried: PT, MDP  The treatment provided no relief.       The following portions of the patient's history were reviewed and updated as appropriate: allergies, current medications, past family history, past medical history, past social history, past surgical history and problem list.    Review of Systems   Gastrointestinal: Negative for bowel incontinence.   Genitourinary: Negative for bladder incontinence.   Musculoskeletal: Positive for arthralgias and back pain (R thigh/ knee).   All other systems reviewed and are negative.      Objective   Physical Exam   Constitutional: She is oriented to person, place, and time. She appears well-developed and well-nourished. She is cooperative. No distress.   HENT:   Head: Normocephalic and atraumatic.   Eyes: Conjunctivae are normal. Right eye exhibits no discharge. Left eye exhibits no discharge.   Neck: Normal range of motion. Neck supple. No tracheal deviation present.   Cardiovascular: Normal rate and intact distal pulses.   Pulmonary/Chest: Effort normal. No respiratory distress.   Abdominal: Soft. She exhibits no distension. There is no tenderness.   Musculoskeletal: Normal range of motion. She exhibits no edema or tenderness.   Neurological: She is alert and oriented to person, place, and time. She has normal reflexes. She displays normal reflexes. No sensory deficit. She exhibits normal muscle tone. Coordination normal. GCS eye subscore is 4. GCS verbal subscore is 5. GCS motor subscore is 6.   No motor or sensory deficits. DTR's normal. Negative clonus. SLR mildly  positive on the right at 30 degrees.   Able to bear weight on heels and toes bilaterally.     Skin: Skin is warm and dry. She is not diaphoretic. No erythema.   Psychiatric: She has a normal mood and affect. Thought content normal.   Vitals reviewed.    Neurologic Exam     Mental Status   Oriented to person, place, and time.       Assessment/Plan   Independent Review of Radiographic Studies:      I have independently reviewed MRI images of the lumbar spine dated January 23, 2020 from [facility] today in the office.  The MRI shows multilevel degenerative changes.  Disc height loss noted at L3-4 and L4-5.  All neural foraminal narrowing noted bilaterally at L3-4.  A broad-based disc osteophyte complex noted at L4-5 greatest to the right with an associated broad-based disc protrusion.  Moderate central canal narrowing and severe lateral recess narrowing on the right secondary to this disc protrusion.  There is also degenerative disc desiccation at L5-S1 with mild neural foraminal narrowing bilaterally secondary to extension of the disc protrusion.    Medical Decision Making:      Ms. Mathias was seen in the office for the above complaints.  I have reviewed her MRI images.  There is enough there to explain her symptoms but yet it is possible that she may improve with further conservative management such as lumbar epidural steroid injection.  The patient also desires to remain conservative if possible.  She will try the lumbar epidural injection and continue with her PT exercises.  If her pain symptoms worsen she will call the office otherwise I will see her back in the office in 1 month.    Angely was seen today for back pain and leg pain.    Diagnoses and all orders for this visit:    Intervertebral disc disorder with radiculopathy of lumbar region  -     Epidural Block      Return in about 1 month (around 3/19/2020) for Dariana Whitt.                 Answers for HPI/ROS submitted by the patient on 2/12/2020    What is the primary reason for your visit?: Other  Please describe your symptoms.: back pain radiating down right lat thigh to ant. lat knee  Have you had these symptoms before?: No  How long have you been having these symptoms?: 1-4 weeks ago  Please list any medications you are currently taking for this condition.: Naprosyn bid, Norco 5mg q4-6 prn  Please describe any probable cause for these symptoms. : no idea

## 2020-02-20 ENCOUNTER — DOCUMENTATION (OUTPATIENT)
Dept: PHYSICAL THERAPY | Facility: HOSPITAL | Age: 60
End: 2020-02-20

## 2020-02-20 NOTE — THERAPY DISCHARGE NOTE
Outpatient Physical Therapy Ortho Discharge Summary       Patient Name: Angely Mathias  : 1960  MRN: [id number]  Today's Date: 2020      Visit Date: 2020      OP PT Discharge Summary  Date of Discharge: 20  Reason for Discharge: Patient/Caregiver request  Outcomes Achieved: Patient able to partially acheive established goals  Discharge Destination: Home with home program  Discharge Instructions/Additional Comments: PT episode for neck discharged due to no return after last visit.  She partially achieved her goals and was working on a home program. Of note, this patient is now in PT for lumbar pain.      Flaco Betancur, PT  2020

## 2020-02-21 ENCOUNTER — HOSPITAL ENCOUNTER (OUTPATIENT)
Dept: PHYSICAL THERAPY | Facility: HOSPITAL | Age: 60
Setting detail: THERAPIES SERIES
Discharge: HOME | End: 2020-02-21

## 2020-02-21 DIAGNOSIS — R52 PAIN AGGRAVATED BY STANDING: ICD-10-CM

## 2020-02-21 DIAGNOSIS — M79.604 PAIN OF RIGHT LOWER EXTREMITY: ICD-10-CM

## 2020-02-21 DIAGNOSIS — M54.41 ACUTE RIGHT-SIDED LOW BACK PAIN WITH RIGHT-SIDED SCIATICA: Primary | ICD-10-CM

## 2020-02-21 DIAGNOSIS — R52 PAIN AGGRAVATED BY WALKING: ICD-10-CM

## 2020-02-21 PROCEDURE — 97012 MECHANICAL TRACTION THERAPY: CPT | Performed by: PHYSICAL THERAPIST

## 2020-02-21 PROCEDURE — 97110 THERAPEUTIC EXERCISES: CPT | Performed by: PHYSICAL THERAPIST

## 2020-02-21 NOTE — THERAPY TREATMENT NOTE
Outpatient Physical Therapy Ortho Treatment Note  [facility]     Patient Name: Angely Mathias  : 1960  MRN: [id number]  Today's Date: 2020      Visit Date: 2020    Visit Dx:    ICD-10-CM ICD-9-CM   1. Acute right-sided low back pain with right-sided sciatica M54.41 724.2     724.3   2. Pain of right lower extremity M79.604 729.5   3. Pain aggravated by walking R52 780.96   4. Pain aggravated by standing R52 780.96       Patient Active Problem List   Diagnosis   • Atopic rhinitis   • Hyperlipidemia   • Chronic low back pain   • Neck pain   • Persistent insomnia   • Overweight   • Lung nodule   • Attention deficit hyperactivity disorder (ADHD), predominantly inattentive type   • Chronic bronchitis (CMS/HCC)   • Other specified glaucoma   • Intervertebral disc disorder with radiculopathy of lumbar region        Past Medical History:   Diagnosis Date   • Acute bronchitis    • Acute pain    • ADHD (attention deficit hyperactivity disorder)     years ago. worse w/ job type change   • Allergic     since i was little   • Blood tests for routine general physical examination    • Cholelithiasis     resolved with GB removal   • Diarrhea    • Glaucoma     treated w/ eye drops   • Headache 1968    ocular and allergy induced   • Hyperlipidemia     noted on past lab work   • Low back pain     occasional chronic   • Microscopic hematuria    • Obesity     at times   • Visual impairment 1965    wear contacts/glasses        Past Surgical History:   Procedure Laterality Date   • ABDOMINOPLASTY     • BREAST SURGERY      Enlargement Procedure   • CHOLECYSTECTOMY     • COLONOSCOPY     • COSMETIC SURGERY      breast aug 1989 and    • DIAGNOSTIC LAPAROSCOPY EXPLORATORY LAPAROTOMY     • DILATATION AND CURETTAGE     • EYE SURGERY  1996   • OTHER SURGICAL HISTORY      Vaginal Sling Operation for Stress Incontinence   • TUBAL ABDOMINAL LIGATION     • WRIST SURGERY                          PT Assessment/Plan     Row Name 02/21/20 0954          PT Assessment    Assessment Comments  Patient able to centralize symptoms with mechanical traction and prone positional relief.  Discussed option of trialing prone figure 4 for lateral symptoms which she was receptive to. Will continue with core strengthening in addition to positional relief and hold on SKTC for now focusing on extension bias.  -GR        PT Plan    PT Plan Comments  Trial extension bias.  -GR       User Key  (r) = Recorded By, (t) = Taken By, (c) = Cosigned By    Initials Name Provider Type    Flaco Dsouza, PT Physical Therapist          Modalities     Row Name 02/21/20 0900             Subjective Comments    Subjective Comments  Reports no worse after manual therapy last time and would like to try traction.  Pain remains R sided traveling to the knee although back hurst too.  -GR         Subjective Pain    Able to rate subjective pain?  yes  -GR      Pre-Treatment Pain Level  3  -GR         Traction 65809    Traction Type  Lumbar  -GR      Rx Minutes  10  -GR      Duration  Intermittent  -GR      Position  Other 90/90  -GR      Weight  75 /35  -GR      Hold  45  -GR      Relax  15  -GR        User Key  (r) = Recorded By, (t) = Taken By, (c) = Cosigned By    Initials Name Provider Type    Flaco Dsouza, PT Physical Therapist        OP Exercises     Row Name 02/21/20 0900             Subjective Comments    Subjective Comments  Reports no worse after manual therapy last time and would like to try traction.  Pain remains R sided traveling to the knee although back hurst too.  -GR         Subjective Pain    Able to rate subjective pain?  yes  -GR      Pre-Treatment Pain Level  3  -GR         Total Minutes    11756 - PT Therapeutic Exercise Minutes  25  -GR         Exercise 1    Exercise Name 1  Reviewed current HEP  -GR      Time 1  10 min  -GR         Exercise 2    Exercise Name 2  Prone over single pillow  -GR       Time 2  10 min  -GR      Additional Comments  with review of spine model and explanation of disc herniation and resolution  -GR         Exercise 7    Exercise Name 7  Nustep  -GR      Cueing 7  Verbal  -GR      Time 7  5 min  -GR        User Key  (r) = Recorded By, (t) = Taken By, (c) = Cosigned By    Initials Name Provider Type    Flaco Dsouza, PT Physical Therapist                       PT OP Goals     Row Name 02/21/20 0900          PT Short Term Goals    STG Date to Achieve  02/14/20  -GR     STG 1  Patient will be independent with initial HEP without exacerbation of pain.  -GR     STG 1 Progress  Met  -GR     STG 2  Patient will improve postural awareness in sitting and standing, including use of TrA stabilization, to reduce strain on low back during ADLs.  -GR     STG 2 Progress  Ongoing  -GR     STG 3  Patient will report reduction in LBP, R LE from 9/10 at worst with activity, to 5/10 or less.  -GR     STG 3 Progress  Ongoing  -GR        Long Term Goals    LTG Date to Achieve  03/06/20  -GR     LTG 1  Patient will be independent with advanced strength/stabilization, flexibility program without cueing to allow continued symptom management independently.  -GR     LTG 1 Progress  Ongoing  -GR     LTG 2  Patient will report decreased perceived disability on the Oswestry from 46 % to 30% or less  -GR     LTG 2 Progress  Ongoing  -GR     LTG 3  Patient will demonstrate proper postural awareness and body mechanics with performance of ADLs, work activities to reduce risk of further injury.  -GR     LTG 3 Progress  Ongoing  -GR     LTG 4  Patient will report improved tolerance to standing/walking from 5min to 20 min or greater, demonstrating proper core stabilization without R LE pain to allow improved tolerance to functional mobility and ADLs  -GR     LTG 4 Progress  Ongoing  -GR       User Key  (r) = Recorded By, (t) = Taken By, (c) = Cosigned By    Initials Name Provider Type    Flaco Dsouza,  PT Physical Therapist          Therapy Education  Education Details: review of HEP, update to HEP, use of positional relief, explanation of disc protrusion and resolution, risk/benefit to traction              Time Calculation:   Start Time: 0845  Stop Time: 0925  Time Calculation (min): 40 min  Total Timed Code Minutes- PT: 25 minute(s)  Therapy Charges for Today     Code Description Service Date Service Provider Modifiers Qty    76604061561  PT THER PROC EA 15 MIN 2/21/2020 Flaco Betancur, PT GP 2    [id number]  PT-TRACTION MECHANICAL 2/21/2020 Flaco Betancur, PT  1                    Flaco Betancur, PT  2/21/2020

## 2020-02-25 DIAGNOSIS — F90.0 ATTENTION DEFICIT HYPERACTIVITY DISORDER (ADHD), PREDOMINANTLY INATTENTIVE TYPE: ICD-10-CM

## 2020-02-25 RX ORDER — DEXTROAMPHETAMINE SACCHARATE, AMPHETAMINE ASPARTATE MONOHYDRATE, DEXTROAMPHETAMINE SULFATE AND AMPHETAMINE SULFATE 7.5; 7.5; 7.5; 7.5 MG/1; MG/1; MG/1; MG/1
30 CAPSULE, EXTENDED RELEASE ORAL EVERY MORNING
Qty: 30 CAPSULE | Refills: 0 | Status: SHIPPED | OUTPATIENT
Start: 2020-02-25 | End: 2020-03-24 | Stop reason: SDUPTHER

## 2020-02-28 ENCOUNTER — HOSPITAL ENCOUNTER (OUTPATIENT)
Dept: PHYSICAL THERAPY | Facility: HOSPITAL | Age: 60
Setting detail: THERAPIES SERIES
Discharge: HOME | End: 2020-02-28

## 2020-02-28 PROCEDURE — 97012 MECHANICAL TRACTION THERAPY: CPT | Performed by: PHYSICAL THERAPIST

## 2020-02-28 PROCEDURE — 97110 THERAPEUTIC EXERCISES: CPT | Performed by: PHYSICAL THERAPIST

## 2020-02-28 NOTE — THERAPY TREATMENT NOTE
Outpatient Physical Therapy Ortho Treatment Note  [facility]     Patient Name: Angely Mathias  : 1960  MRN: [id number]  Today's Date: 2020      Visit Date: 2020    Visit Dx:  No diagnosis found.    Patient Active Problem List   Diagnosis   • Atopic rhinitis   • Hyperlipidemia   • Chronic low back pain   • Neck pain   • Persistent insomnia   • Overweight   • Lung nodule   • Attention deficit hyperactivity disorder (ADHD), predominantly inattentive type   • Chronic bronchitis (CMS/HCC)   • Other specified glaucoma   • Intervertebral disc disorder with radiculopathy of lumbar region        Past Medical History:   Diagnosis Date   • Acute bronchitis    • Acute pain    • ADHD (attention deficit hyperactivity disorder)     years ago. worse w/ job type change   • Allergic     since i was little   • Blood tests for routine general physical examination    • Cholelithiasis     resolved with GB removal   • Diarrhea    • Glaucoma     treated w/ eye drops   • Headache     ocular and allergy induced   • Hyperlipidemia     noted on past lab work   • Low back pain 1993    occasional chronic   • Microscopic hematuria    • Obesity     at times   • Visual impairment 1965    wear contacts/glasses        Past Surgical History:   Procedure Laterality Date   • ABDOMINOPLASTY     • BREAST SURGERY      Enlargement Procedure   • CHOLECYSTECTOMY     • COLONOSCOPY     • COSMETIC SURGERY      breast aug 1989 and    • DIAGNOSTIC LAPAROSCOPY EXPLORATORY LAPAROTOMY     • DILATATION AND CURETTAGE     • EYE SURGERY      RK    • OTHER SURGICAL HISTORY      Vaginal Sling Operation for Stress Incontinence   • TUBAL ABDOMINAL LIGATION     • WRIST SURGERY                         PT Assessment/Plan     Row Name 20 1247          PT Assessment    Assessment Comments  Symptoms centralizing overall.  Increased focus on lateral component and added press up.    -GR        PT Plan    PT  Plan Comments  Continue with extension bias.  -GR       User Key  (r) = Recorded By, (t) = Taken By, (c) = Cosigned By    Initials Name Provider Type    Flaco Dsouza, PT Physical Therapist          Modalities     Row Name 02/28/20 1100             Subjective Comments    Subjective Comments  Although she has had a few flare-ups she is mostly centralized.  -GR         Subjective Pain    Able to rate subjective pain?  yes  -GR      Pre-Treatment Pain Level  2  -GR         Traction 55297    Traction Type  Lumbar  -GR      Rx Minutes  12  -GR      Duration  Intermittent  -GR      Position  Other 90/90  -GR      Weight  75 /35  -GR      Hold  45  -GR      Relax  15  -GR        User Key  (r) = Recorded By, (t) = Taken By, (c) = Cosigned By    Initials Name Provider Type    Flaco Dsouza, PT Physical Therapist        OP Exercises     Row Name 02/28/20 1100             Subjective Comments    Subjective Comments  Although she has had a few flare-ups she is mostly centralized.  -GR         Subjective Pain    Able to rate subjective pain?  yes  -GR      Pre-Treatment Pain Level  2  -GR         Total Minutes    53878 - PT Therapeutic Exercise Minutes  15  -GR         Exercise 1    Exercise Name 1  Prone on elbows  -GR      Time 1  2 min  -GR         Exercise 2    Exercise Name 2  Prone   -GR      Time 2  1 min  -GR         Exercise 3    Exercise Name 3  Prone figure 4 R  -GR      Reps 3  5 min  -GR      Additional Comments  with intermittent PA mobilization to L4-5  -GR         Exercise 4    Exercise Name 4  Prone figure 4 R with press up  -GR      Reps 4  10  -GR        User Key  (r) = Recorded By, (t) = Taken By, (c) = Cosigned By    Initials Name Provider Type    Flaco Dsouza, PT Physical Therapist                       PT OP Goals     Row Name 02/28/20 1200          PT Short Term Goals    STG Date to Achieve  02/14/20  -GR     STG 1  Patient will be independent with initial HEP without exacerbation of  pain.  -GR     STG 1 Progress  Met  -GR     STG 2  Patient will improve postural awareness in sitting and standing, including use of TrA stabilization, to reduce strain on low back during ADLs.  -GR     STG 2 Progress  Ongoing;Progressing  -GR     STG 3  Patient will report reduction in LBP, R LE from 9/10 at worst with activity, to 5/10 or less.  -GR     STG 3 Progress  Met  -GR        Long Term Goals    LTG Date to Achieve  03/06/20  -GR     LTG 1  Patient will be independent with advanced strength/stabilization, flexibility program without cueing to allow continued symptom management independently.  -GR     LTG 1 Progress  Ongoing  -GR     LTG 2  Patient will report decreased perceived disability on the Oswestry from 46 % to 30% or less  -GR     LTG 2 Progress  Ongoing  -GR     LTG 3  Patient will demonstrate proper postural awareness and body mechanics with performance of ADLs, work activities to reduce risk of further injury.  -GR     LTG 3 Progress  Ongoing  -GR     LTG 4  Patient will report improved tolerance to standing/walking from 5min to 20 min or greater, demonstrating proper core stabilization without R LE pain to allow improved tolerance to functional mobility and ADLs  -     LTG 4 Progress  Ongoing  -GR       User Key  (r) = Recorded By, (t) = Taken By, (c) = Cosigned By    Initials Name Provider Type     Flaco Betancur, PT Physical Therapist                         Time Calculation:   Start Time: 1145  Stop Time: 1215  Time Calculation (min): 30 min  Total Timed Code Minutes- PT: 15 minute(s)  Therapy Charges for Today     Code Description Service Date Service Provider Modifiers Qty    15756293703  PT THER PROC EA 15 MIN 2/28/2020 Flaco Betancur, PT GP 1    [id number]  PT-TRACTION MECHANICAL 2/28/2020 Flaco Betancur, PT  1                    Flaco WEINBERG. Gypsy, PT  2/28/2020

## 2020-03-03 ENCOUNTER — APPOINTMENT (OUTPATIENT)
Dept: PAIN MEDICINE | Facility: HOSPITAL | Age: 60
End: 2020-03-03

## 2020-03-06 ENCOUNTER — APPOINTMENT (OUTPATIENT)
Dept: PHYSICAL THERAPY | Facility: HOSPITAL | Age: 60
End: 2020-03-06

## 2020-03-11 ENCOUNTER — ANESTHESIA (OUTPATIENT)
Dept: PAIN MEDICINE | Facility: HOSPITAL | Age: 60
End: 2020-03-11

## 2020-03-11 ENCOUNTER — HOSPITAL ENCOUNTER (OUTPATIENT)
Dept: GENERAL RADIOLOGY | Facility: HOSPITAL | Age: 60
Discharge: HOME | End: 2020-03-11

## 2020-03-11 ENCOUNTER — HOSPITAL ENCOUNTER (OUTPATIENT)
Dept: PAIN MEDICINE | Facility: HOSPITAL | Age: 60
Discharge: HOME | End: 2020-03-11
Admitting: ANESTHESIOLOGY

## 2020-03-11 ENCOUNTER — ANESTHESIA EVENT (OUTPATIENT)
Dept: PAIN MEDICINE | Facility: HOSPITAL | Age: 60
End: 2020-03-11

## 2020-03-11 VITALS
HEIGHT: 69 IN | SYSTOLIC BLOOD PRESSURE: 122 MMHG | WEIGHT: 190 LBS | OXYGEN SATURATION: 97 % | RESPIRATION RATE: 16 BRPM | DIASTOLIC BLOOD PRESSURE: 46 MMHG | BODY MASS INDEX: 28.14 KG/M2 | HEART RATE: 90 BPM | TEMPERATURE: 97.9 F

## 2020-03-11 DIAGNOSIS — M51.36 DDD (DEGENERATIVE DISC DISEASE), LUMBAR: Primary | ICD-10-CM

## 2020-03-11 DIAGNOSIS — R52 PAIN: ICD-10-CM

## 2020-03-11 PROCEDURE — 25010000002 METHYLPREDNISOLONE PER 80 MG: Performed by: ANESTHESIOLOGY

## 2020-03-11 PROCEDURE — C1755 CATHETER, INTRASPINAL: HCPCS

## 2020-03-11 PROCEDURE — 77003 FLUOROGUIDE FOR SPINE INJECT: CPT

## 2020-03-11 RX ORDER — MIDAZOLAM HYDROCHLORIDE 1 MG/ML
1 INJECTION INTRAMUSCULAR; INTRAVENOUS
Status: DISCONTINUED | OUTPATIENT
Start: 2020-03-11 | End: 2020-03-12 | Stop reason: HOSPADM

## 2020-03-11 RX ORDER — METHYLPREDNISOLONE ACETATE 80 MG/ML
80 INJECTION, SUSPENSION INTRA-ARTICULAR; INTRALESIONAL; INTRAMUSCULAR; SOFT TISSUE ONCE
Status: COMPLETED | OUTPATIENT
Start: 2020-03-11 | End: 2020-03-11

## 2020-03-11 RX ORDER — FENTANYL CITRATE 50 UG/ML
50 INJECTION, SOLUTION INTRAMUSCULAR; INTRAVENOUS AS NEEDED
Status: DISCONTINUED | OUTPATIENT
Start: 2020-03-11 | End: 2020-03-12 | Stop reason: HOSPADM

## 2020-03-11 RX ORDER — LIDOCAINE HYDROCHLORIDE 10 MG/ML
1 INJECTION, SOLUTION INFILTRATION; PERINEURAL ONCE
Status: DISCONTINUED | OUTPATIENT
Start: 2020-03-11 | End: 2020-03-12 | Stop reason: HOSPADM

## 2020-03-11 RX ADMIN — METHYLPREDNISOLONE ACETATE 80 MG: 80 INJECTION, SUSPENSION INTRA-ARTICULAR; INTRALESIONAL; INTRAMUSCULAR; SOFT TISSUE at 10:31

## 2020-03-11 NOTE — ANESTHESIA PROCEDURE NOTES
PAIN Epidural block      Patient reassessed immediately prior to procedure    Patient location during procedure: pain clinic  Indication:procedure for pain  Performed By  Anesthesiologist: Dawson Li MD  Preanesthetic Checklist  Completed: patient identified, site marked, surgical consent, pre-op evaluation, timeout performed, risks and benefits discussed and monitors and equipment checked  Additional Notes  Depomedrol - 80mg    Needle position confirmed by fluoroscopy. No contrast due to allergy.    Diagnosis  Post-Op Diagnosis Codes:     * Lumbar degenerative disc disease (M51.36)     * Lumbar disc disease (M51.9)     * Lumbar radiculopathy (M54.16)    Prep:  Pt Position:prone  Sterile Tech:cap, gloves, mask and sterile barrier  Prep:chlorhexidine gluconate and isopropyl alcohol  Monitoring:blood pressure monitoring, continuous pulse oximetry and EKG  Procedure:Sedation: no     Approach:right paramedian  Guidance: fluoroscopy  Location:lumbar  Level:4-5  Needle Type:Tuohy  Needle Gauge:20  Aspiration:negative  Medications:  Depomedrol:80 mg  Preservative Free Saline:2mL  Isovue:0mL    Post Assessment:  Post-procedure: bandaide.  Pt Tolerance:patient tolerated the procedure well with no apparent complications  Complications:no

## 2020-03-11 NOTE — H&P
[facility]    History and Physical    Patient Name: Angely Mathias  :  1960  MRN:  [id number]  Date of Admission: 3/11/2020    Subjective     Patient is a 59 y.o. female presents with chief complaint of chronic, intermitent, moderate low back pain.  Onset of symptoms was gradual starting several months ago.  Symptoms are associated/aggravated by activity, exercise, standing or twisting. Symptoms improve with rest    The following portions of the patients history were reviewed and updated as appropriate: current medications, allergies, past medical history, past surgical history, past family history, past social history and problem list                Objective     Past Medical History:   Past Medical History:   Diagnosis Date   • Acute bronchitis    • Acute pain    • ADHD (attention deficit hyperactivity disorder)     years ago. worse w/ job type change   • Allergic     since i was little   • Blood tests for routine general physical examination    • Cholelithiasis     resolved with GB removal   • Diarrhea    • Glaucoma     treated w/ eye drops   • Headache     ocular and allergy induced   • Hyperlipidemia     noted on past lab work   • Low back pain     occasional chronic   • Microscopic hematuria    • Obesity     at times   • Visual impairment 1965    wear contacts/glasses     Past Surgical History:   Past Surgical History:   Procedure Laterality Date   • ABDOMINOPLASTY     • BREAST SURGERY      Enlargement Procedure   • CHOLECYSTECTOMY     • COLONOSCOPY     • COSMETIC SURGERY      breast aug 1989 and    • DIAGNOSTIC LAPAROSCOPY EXPLORATORY LAPAROTOMY     • DILATATION AND CURETTAGE     • EYE SURGERY      RK    • OTHER SURGICAL HISTORY      Vaginal Sling Operation for Stress Incontinence   • TUBAL ABDOMINAL LIGATION     • WRIST SURGERY       Family History:   Family History   Problem Relation Age of Onset   • Breast cancer Sister    • Bipolar disorder  Brother    • Diabetes Brother         type 2 DM   • Diabetes Brother    • Alcohol abuse Father         D.    • Cancer Sister         Breast ca    • Diabetes Maternal Grandfather         DX in his 80's   • Early death Brother          in sleep at 48-   • Hearing loss Mother         L ear   • Miscarriages / Stillbirths Mother         child number 4   • Vision loss Mother         cataracts replaced     Social History:   Social History     Tobacco Use   • Smoking status: Never Smoker   • Smokeless tobacco: Never Used   • Tobacco comment: none   Substance Use Topics   • Alcohol use: Yes     Types: 2 Glasses of wine, 2 Standard drinks or equivalent per week   • Drug use: No       Vital Signs Range for the last 24 hours  Temperature:     Temp Source:     BP:     Pulse:     Respirations:     SPO2:     O2 Amount (l/min):     O2 Devices     Weight:           --------------------------------------------------------------------------------    Current Outpatient Medications   Medication Sig Dispense Refill   • amphetamine-dextroamphetamine XR (ADDERALL XR) 30 MG 24 hr capsule Take 1 capsule by mouth Every Morning 30 capsule 0   • azelastine (ASTELIN) 0.1 % nasal spray      • bimatoprost (LUMIGAN) 0.01 % ophthalmic drops 1 drop Every Night.     • bimatoprost (LUMIGAN) 0.03 % ophthalmic drops      • Calcium Carbonate-Vitamin D (CALCIUM + D PO) Take by mouth.     • COMBIVENT RESPIMAT  MCG/ACT inhaler      • DULERA 200-5 MCG/ACT inhaler      • DYMISTA 137-50 MCG/ACT suspension      • estradiol (VAGIFEM) 10 MCG tablet vaginal tablet      • HYDROcod Polst-CPM Polst ER (TUSSIONEX PENNKINETIC ER) 10-8 MG/5ML ER suspension Take 5 mL by mouth Every 12 (Twelve) Hours As Needed for Cough. 115 mL 0   • HYDROcodone-acetaminophen (NORCO)  MG per tablet Take 1 tablet by mouth Every 6 (Six) Hours As Needed for Moderate Pain . 50 tablet 0   • methylPREDNISolone (MEDROL, BOYD,) 4 MG tablet Take as directed on package  instructions. 21 tablet 0   • naproxen (NAPROSYN) 500 MG tablet TAKE ONE TABLET BY MOUTH TWICE A DAY WITH MEALS 60 tablet 0   • norethindrone-ethinyl estradiol (FEMHRT 1/5) 1-5 MG-MCG tablet Take by mouth.       No current facility-administered medications for this encounter.        --------------------------------------------------------------------------------  Assessment/Plan      Anesthesia Evaluation     Patient summary reviewed and Nursing notes reviewed         Pain impairs ability to perform ADLs: Sleeping  Modalities previously tried to control pain with limited effectiveness within the last 4-6 weeks: Rest     Airway   Mallampati: II  TM distance: >3 FB  Neck ROM: full  Dental - normal exam     Pulmonary - negative pulmonary ROS and normal exam   Cardiovascular - normal exam    (+) hyperlipidemia,       Neuro/Psych- neuro exam normal  (+) headaches, numbness, psychiatric history ADHD,     GI/Hepatic/Renal/Endo - negative ROS     Musculoskeletal (-) normal exam    (+) back pain, neck pain, radiculopathy  Abdominal  - normal exam   Substance History - negative use     OB/GYN negative ob/gyn ROS         Other                   Diagnosis and Plan    Treatment Plan  ASA 2      Procedures: Lumbar Epidural Steroid Injection(LESI), With fluoroscopy,       Anesthetic plan and risks discussed with patient.      Target area L4-5    Diagnosis     * Lumbar degenerative disc disease [M51.36]     * Lumbar disc disease [M51.9]     * Lumbar radiculopathy [M54.16]

## 2020-03-13 ENCOUNTER — HOSPITAL ENCOUNTER (OUTPATIENT)
Dept: PHYSICAL THERAPY | Facility: HOSPITAL | Age: 60
Setting detail: THERAPIES SERIES
Discharge: HOME | End: 2020-03-13

## 2020-03-13 DIAGNOSIS — M79.604 PAIN OF RIGHT LOWER EXTREMITY: ICD-10-CM

## 2020-03-13 DIAGNOSIS — R52 PAIN AGGRAVATED BY STANDING: ICD-10-CM

## 2020-03-13 DIAGNOSIS — M54.41 ACUTE RIGHT-SIDED LOW BACK PAIN WITH RIGHT-SIDED SCIATICA: Primary | ICD-10-CM

## 2020-03-13 DIAGNOSIS — R52 PAIN AGGRAVATED BY WALKING: ICD-10-CM

## 2020-03-13 PROCEDURE — 97110 THERAPEUTIC EXERCISES: CPT | Performed by: PHYSICAL THERAPIST

## 2020-03-13 PROCEDURE — 97012 MECHANICAL TRACTION THERAPY: CPT | Performed by: PHYSICAL THERAPIST

## 2020-03-13 NOTE — THERAPY PROGRESS REPORT/RE-CERT
Outpatient Physical Therapy Ortho Re-Assessment  [facility]     Patient Name: Angely Mathias  : 1960  MRN: [id number]  Today's Date: 3/13/2020      Visit Date: 2020    Patient Active Problem List   Diagnosis   • Atopic rhinitis   • Hyperlipidemia   • Chronic low back pain   • Neck pain   • Persistent insomnia   • Overweight   • Lung nodule   • Attention deficit hyperactivity disorder (ADHD), predominantly inattentive type   • Chronic bronchitis (CMS/HCC)   • Other specified glaucoma   • Intervertebral disc disorder with radiculopathy of lumbar region        Past Medical History:   Diagnosis Date   • Acute bronchitis    • Acute pain    • ADHD (attention deficit hyperactivity disorder)     years ago. worse w/ job type change   • Allergic     since i was little   • Blood tests for routine general physical examination    • Cholelithiasis     resolved with GB removal   • Diarrhea    • Glaucoma     treated w/ eye drops   • Headache     ocular and allergy induced   • Hyperlipidemia     noted on past lab work   • Low back pain     occasional chronic   • Microscopic hematuria    • Obesity     at times   • Visual impairment 1965    wear contacts/glasses        Past Surgical History:   Procedure Laterality Date   • ABDOMINOPLASTY     • BREAST SURGERY      Enlargement Procedure   • CHOLECYSTECTOMY     • COLONOSCOPY     • COSMETIC SURGERY      breast aug 1989 and    • DIAGNOSTIC LAPAROSCOPY EXPLORATORY LAPAROTOMY     • DILATATION AND CURETTAGE     • EPIDURAL BLOCK     • EYE SURGERY  1996   • OTHER SURGICAL HISTORY      Vaginal Sling Operation for Stress Incontinence   • TUBAL ABDOMINAL LIGATION     • WRIST SURGERY         Visit Dx:     ICD-10-CM ICD-9-CM   1. Acute right-sided low back pain with right-sided sciatica M54.41 724.2     724.3   2. Pain of right lower extremity M79.604 729.5   3. Pain aggravated by walking R52 780.96   4. Pain aggravated by  standing R52 780.96             PT Ortho     Row Name 03/13/20 0900       Lumbar ROM Screen- Lower Quarter Clearing    Lumbar Flexion  Normal  -GR    Lumbar Extension  Normal with end range pain  -GR    Lumbar Lateral Flexion  Normal  -GR    Lumbar Rotation  Normal  -GR      User Key  (r) = Recorded By, (t) = Taken By, (c) = Cosigned By    Initials Name Provider Type    GR Flaco Betancur, PT Physical Therapist                            PT OP Goals     Row Name 03/13/20 0900          PT Short Term Goals    STG Date to Achieve  02/14/20  -GR     STG 1  Patient will be independent with initial HEP without exacerbation of pain.  -GR     STG 1 Progress  Met  -GR     STG 2  Patient will improve postural awareness in sitting and standing, including use of TrA stabilization, to reduce strain on low back during ADLs.  -GR     STG 2 Progress  Partially Met  -GR     STG 3  Patient will report reduction in LBP, R LE from 9/10 at worst with activity, to 5/10 or less.  -GR     STG 3 Progress  Met  -GR        Long Term Goals    LTG Date to Achieve  03/06/20  -GR     LTG 1  Patient will be independent with advanced strength/stabilization, flexibility program without cueing to allow continued symptom management independently.  -GR     LTG 1 Progress  Ongoing  -GR     LTG 2  Patient will report decreased perceived disability on the Oswestry from 46 % to 30% or less  -     LTG 2 Progress  Ongoing  -GR     LTG 2 Progress Comments  44%  -     LTG 3  Patient will demonstrate proper postural awareness and body mechanics with performance of ADLs, work activities to reduce risk of further injury.  -     LTG 3 Progress  Ongoing  -GR     LTG 4  Patient will report improved tolerance to standing/walking from 5min to 20 min or greater, demonstrating proper core stabilization without R LE pain to allow improved tolerance to functional mobility and ADLs  -     LTG 4 Progress  Ongoing  -GR       User Key  (r) = Recorded By, (t) = Taken  By, (c) = Cosigned By    Initials Name Provider Type    Flaco Dsouza, PT Physical Therapist          PT Assessment/Plan     Row Name 03/13/20 1010          PT Assessment    Assessment Comments  Patient has attended 6 sessions of skilled PT for low back pain.  She is centralizing with lumbar traction and no epidural intervention. She is progressig with an HEP but not yet independent and has not met all LTGs. Recommend continue at decreased frequency of 1x every other week until LTGs achieved.  -GR        PT Plan    PT Plan Comments  Continue at 1x every other week.  -GR       User Key  (r) = Recorded By, (t) = Taken By, (c) = Cosigned By    Initials Name Provider Type    Flaco Dsouza, PT Physical Therapist          Modalities     Row Name 03/13/20 0900             Traction 37192    Traction Type  Lumbar  -GR      Rx Minutes  15  -GR      Duration  Intermittent  -GR      Position  Other 90/90  -GR      Weight  75 /35  -GR      Hold  45  -GR      Relax  15  -GR        User Key  (r) = Recorded By, (t) = Taken By, (c) = Cosigned By    Initials Name Provider Type    Flaco Dsouza, PT Physical Therapist        OP Exercises     Row Name 03/13/20 0900             Subjective Comments    Subjective Comments  Had an epidural on Wednesday.  This was painful and she had trouble walking after.  -GR         Subjective Pain    Able to rate subjective pain?  yes  -GR      Pre-Treatment Pain Level  1  -GR         Total Minutes    25538 - PT Therapeutic Exercise Minutes  10  -GR         Exercise 1    Exercise Name 1  Review of log roll positioning of mat from traction and HEP for home  -GR        User Key  (r) = Recorded By, (t) = Taken By, (c) = Cosigned By    Initials Name Provider Type    Flaco Dsouza, PT Physical Therapist                        Outcome Measure Options: Modifed Owestry  Modified Oswestry  Modified Oswestry Score/Comments: 44%      Time Calculation:     Start Time: 0930  Stop Time:  1000  Time Calculation (min): 30 min  Total Timed Code Minutes- PT: 15 minute(s)     Therapy Charges for Today     Code Description Service Date Service Provider Modifiers Qty    34274345944 HC PT THER PROC EA 15 MIN 3/13/2020 Flaco Betancur, PT GP 1    44926661381 HC PT-TRACTION MECHANICAL 3/13/2020 Flaco Betancur, PT  1          PT G-Codes  Outcome Measure Options: Modifed Owestry  Modified Oswestry Score/Comments: 44%         Flaco Betancur, PT  3/13/2020

## 2020-03-24 DIAGNOSIS — F90.0 ATTENTION DEFICIT HYPERACTIVITY DISORDER (ADHD), PREDOMINANTLY INATTENTIVE TYPE: ICD-10-CM

## 2020-03-24 DIAGNOSIS — M54.50 LOW BACK PAIN, UNSPECIFIED BACK PAIN LATERALITY, UNSPECIFIED CHRONICITY, UNSPECIFIED WHETHER SCIATICA PRESENT: ICD-10-CM

## 2020-03-24 RX ORDER — HYDROCODONE BITARTRATE AND ACETAMINOPHEN 10; 325 MG/1; MG/1
1 TABLET ORAL EVERY 6 HOURS PRN
Qty: 50 TABLET | Refills: 0 | Status: SHIPPED | OUTPATIENT
Start: 2020-03-24 | End: 2020-07-27 | Stop reason: SDUPTHER

## 2020-03-24 RX ORDER — DEXTROAMPHETAMINE SACCHARATE, AMPHETAMINE ASPARTATE MONOHYDRATE, DEXTROAMPHETAMINE SULFATE AND AMPHETAMINE SULFATE 7.5; 7.5; 7.5; 7.5 MG/1; MG/1; MG/1; MG/1
30 CAPSULE, EXTENDED RELEASE ORAL EVERY MORNING
Qty: 30 CAPSULE | Refills: 0 | Status: SHIPPED | OUTPATIENT
Start: 2020-03-24 | End: 2020-04-24 | Stop reason: SDUPTHER

## 2020-03-26 ENCOUNTER — APPOINTMENT (OUTPATIENT)
Dept: PHYSICAL THERAPY | Facility: HOSPITAL | Age: 60
End: 2020-03-26

## 2020-04-07 ENCOUNTER — APPOINTMENT (OUTPATIENT)
Dept: PAIN MEDICINE | Facility: HOSPITAL | Age: 60
End: 2020-04-07

## 2020-04-24 DIAGNOSIS — F90.0 ATTENTION DEFICIT HYPERACTIVITY DISORDER (ADHD), PREDOMINANTLY INATTENTIVE TYPE: ICD-10-CM

## 2020-04-24 RX ORDER — DEXTROAMPHETAMINE SACCHARATE, AMPHETAMINE ASPARTATE MONOHYDRATE, DEXTROAMPHETAMINE SULFATE AND AMPHETAMINE SULFATE 7.5; 7.5; 7.5; 7.5 MG/1; MG/1; MG/1; MG/1
30 CAPSULE, EXTENDED RELEASE ORAL EVERY MORNING
Qty: 30 CAPSULE | Refills: 0 | Status: SHIPPED | OUTPATIENT
Start: 2020-04-24 | End: 2020-05-24 | Stop reason: SDUPTHER

## 2020-04-29 ENCOUNTER — OFFICE VISIT (OUTPATIENT)
Dept: INTERNAL MEDICINE | Facility: CLINIC | Age: 60
End: 2020-04-29

## 2020-04-29 VITALS
RESPIRATION RATE: 18 BRPM | HEIGHT: 69 IN | DIASTOLIC BLOOD PRESSURE: 68 MMHG | WEIGHT: 190 LBS | SYSTOLIC BLOOD PRESSURE: 155 MMHG | TEMPERATURE: 98 F | BODY MASS INDEX: 28.14 KG/M2

## 2020-04-29 DIAGNOSIS — T14.8XXA ABRASION: ICD-10-CM

## 2020-04-29 DIAGNOSIS — L03.114 CELLULITIS OF LEFT UPPER EXTREMITY: ICD-10-CM
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
## 2020-04-29 PROCEDURE — 99214 OFFICE O/P EST MOD 30 MIN: CPT | Performed by: INTERNAL MEDICINE

## 2020-04-29 RX ORDER — AMOXICILLIN AND CLAVULANATE POTASSIUM 875; 125 MG/1; MG/1
1 TABLET, FILM COATED ORAL 2 TIMES DAILY
Qty: 20 TABLET | Refills: 0 | Status: SHIPPED | OUTPATIENT
Start: 2020-04-29 | End: 2020-05-09

## 2020-04-29 NOTE — PROGRESS NOTES
Subjective     Angely Mathias is a 59 y.o. female who presents with   Chief Complaint   Patient presents with   • Animal Bite     right knuckle        History of Present Illness     One week ago.  She suffered an abrasion from a dog's tooth.  Trying to get her dogs apart.  They are all UTD on shots.  Right hand is tender and swollen.  Symptoms are mild and constant.  No weakness is associated.      Review of Systems   Constitutional: Negative for fever.   Respiratory: Negative.    Cardiovascular: Negative.        The following portions of the patient's history were reviewed and updated as appropriate: allergies, current medications and problem list.    Patient Active Problem List    Diagnosis Date Noted   • Intervertebral disc disorder with radiculopathy of lumbar region 02/19/2020   • Other specified glaucoma 06/11/2019   • Chronic bronchitis (CMS/HCC) 11/28/2018   • Attention deficit hyperactivity disorder (ADHD), predominantly inattentive type 10/28/2016     Note Last Updated: 10/28/2016     Neuropsych testing in 10/2016 with [facility].       • Lung nodule 10/18/2016     Note Last Updated: 11/28/2018     Stable 10/16-10/18     • Atopic rhinitis 02/08/2016   • Hyperlipidemia 02/08/2016     Note Last Updated: 11/28/2018     Description: 1/2014.  10 year risk ASCVD was 1.7 %; 2.1% 10 year risk.       • Chronic low back pain 02/08/2016     Note Last Updated: 2/8/2016     Description: chronic LBP.  Uses hydrocodone three times a week     • Neck pain 02/08/2016   • Persistent insomnia 02/08/2016   • Overweight 02/08/2016       Current Outpatient Medications on File Prior to Visit   Medication Sig Dispense Refill   • amphetamine-dextroamphetamine XR (ADDERALL XR) 30 MG 24 hr capsule Take 1 capsule by mouth Every Morning 30 capsule 0   • azelastine (ASTELIN) 0.1 % nasal spray      • bimatoprost (LUMIGAN) 0.01 % ophthalmic drops 1 drop Every Night.     • bimatoprost (LUMIGAN) 0.03 % ophthalmic drops      •  "Calcium Carbonate-Vitamin D (CALCIUM + D PO) Take by mouth.     • COMBIVENT RESPIMAT  MCG/ACT inhaler      • DULERA 200-5 MCG/ACT inhaler      • DYMISTA 137-50 MCG/ACT suspension      • estradiol (VAGIFEM) 10 MCG tablet vaginal tablet      • HYDROcod Polst-CPM Polst ER (TUSSIONEX PENNKINETIC ER) 10-8 MG/5ML ER suspension Take 5 mL by mouth Every 12 (Twelve) Hours As Needed for Cough. 115 mL 0   • HYDROcodone-acetaminophen (NORCO)  MG per tablet Take 1 tablet by mouth Every 6 (Six) Hours As Needed for Moderate Pain . 50 tablet 0   • methylPREDNISolone (MEDROL, BOYD,) 4 MG tablet Take as directed on package instructions. 21 tablet 0   • naproxen (NAPROSYN) 500 MG tablet TAKE ONE TABLET BY MOUTH TWICE A DAY WITH MEALS 60 tablet 0   • norethindrone-ethinyl estradiol (FEMHRT 1/5) 1-5 MG-MCG tablet Take by mouth.       No current facility-administered medications on file prior to visit.        Objective     /68   Temp 98 °F (36.7 °C) (Temporal)   Resp 18   Ht 175.3 cm (69\")   Wt 86.2 kg (190 lb)   BMI 28.06 kg/m²     Physical Exam   Constitutional: She is oriented to person, place, and time. She appears well-developed and well-nourished.   HENT:   Head: Normocephalic and atraumatic.   Pulmonary/Chest: Effort normal.   Neurological: She is alert and oriented to person, place, and time.   Skin:   Right second MCP with erythema warmth and an abrasion   Psychiatric: She has a normal mood and affect. Her behavior is normal.       Assessment/Plan   Angely was seen today for animal bite.    Diagnoses and all orders for this visit:    Dog bite, initial encounter    Cellulitis of left upper extremity    Other orders  -     amoxicillin-clavulanate (AUGMENTIN) 875-125 MG per tablet; Take 1 tablet by mouth 2 (Two) Times a Day for 10 days.        Discussion    Patient presents with mild cellulitis after a dog bite.  Rx for antibiotic is given.  Let me know if not feeling better over the next several days or if " there is any change in symptoms.           Future Appointments   Date Time Provider Department Center   5/27/2020 10:15 AM Radha Burrows APRN [facility]

## 2020-05-20 NOTE — PROGRESS NOTES
Subjective     History of Present Illness    History of Present Illness: Angely Mathias is a 60 y.o. female is here today for follow-up via Telephone Visit on back and right leg pain. She was last seen 2/19/2020 and was referred to pain management for injections. She has had 1 Lumbar KANE since her last visit.    Received her last epidural injection on March 11 and has done very well since.  She reports that this is the best relief from injection that she is ever had.  She continues report a most complete resolution of right leg pain.  Intermittently she will have discomfort in the right buttock and hip.  She is currently in the process of packing for a large move and this has not worsened her discomfort.  She denies any pain, numbness, tingling or weakness in either leg.  She plans on getting subsequent epidurals as needed.  She is taking NSAIDs as needed for any ongoing low back discomfort.    You have chosen to receive care through a telephone visit. Do you consent to use a telephone visit for your medical care today? Yes    The following portions of the patient's history were reviewed and updated as appropriate: allergies, current medications, past family history, past medical history, past social history, past surgical history and problem list.    Review of Systems   Constitutional: Negative.    HENT: Negative.    Eyes: Negative.    Cardiovascular: Negative.    Gastrointestinal: Negative.    Endocrine: Negative.    Genitourinary: Negative.    Musculoskeletal: Positive for back pain. Negative for arthralgias, gait problem and joint swelling.   Skin: Negative.    Allergic/Immunologic: Negative.    Neurological: Negative for weakness.   Hematological: Negative.    Psychiatric/Behavioral: Negative.        Objective     Physical Exam  Neurologic Exam        Assessment/Plan   Independent Review of Radiographic Studies:      I personally reviewed the images from the following studies.    Pain management notes  reviewed    Medical Decision Making:    I called the patient and spent 11 minutes on the phone for her follow-up telehealth visit.  She was at home and I completed the visit from the office.  She is doing well and reports almost complete resolution of right leg pain following the most recent epidural in March.  She denies any new problems.  From our standpoint she is stable and we will see her back on an as-needed basis.  She is to schedule follow-up epidurals as needed.    Plan: Continue epidurals as needed, return to office as needed.    Diagnoses and all orders for this visit:    Chronic bilateral low back pain with sciatica, sciatica laterality unspecified      Return if symptoms worsen or fail to improve.

## 2020-05-24 DIAGNOSIS — F90.0 ATTENTION DEFICIT HYPERACTIVITY DISORDER (ADHD), PREDOMINANTLY INATTENTIVE TYPE: ICD-10-CM

## 2020-05-25 RX ORDER — DEXTROAMPHETAMINE SACCHARATE, AMPHETAMINE ASPARTATE MONOHYDRATE, DEXTROAMPHETAMINE SULFATE AND AMPHETAMINE SULFATE 7.5; 7.5; 7.5; 7.5 MG/1; MG/1; MG/1; MG/1
30 CAPSULE, EXTENDED RELEASE ORAL EVERY MORNING
Qty: 30 CAPSULE | Refills: 0 | Status: SHIPPED | OUTPATIENT
Start: 2020-05-25 | End: 2020-06-29 | Stop reason: SDUPTHER

## 2020-05-26 ENCOUNTER — TELEPHONE (OUTPATIENT)
Dept: NEUROSURGERY | Facility: CLINIC | Age: 60
End: 2020-05-26

## 2020-05-26 NOTE — TELEPHONE ENCOUNTER
I called and left message on patient’s voicemail regarding appointment on 5-27-20 @ 1015 am has been changed into telephone visit and do not need to come into the office.  Call the office with any questions.

## 2020-05-27 ENCOUNTER — OFFICE VISIT (OUTPATIENT)
Dept: NEUROSURGERY | Facility: CLINIC | Age: 60
End: 2020-05-27

## 2020-05-27 DIAGNOSIS — M54.40 CHRONIC BILATERAL LOW BACK PAIN WITH SCIATICA, SCIATICA LATERALITY UNSPECIFIED: Primary | ICD-10-CM

## 2020-05-27 DIAGNOSIS — G89.29 CHRONIC BILATERAL LOW BACK PAIN WITH SCIATICA, SCIATICA LATERALITY UNSPECIFIED: Primary | ICD-10-CM

## 2020-05-27 PROCEDURE — 99442 PR PHYS/QHP TELEPHONE EVALUATION 11-20 MIN: CPT | Performed by: NURSE PRACTITIONER

## 2020-06-29 DIAGNOSIS — F90.0 ATTENTION DEFICIT HYPERACTIVITY DISORDER (ADHD), PREDOMINANTLY INATTENTIVE TYPE: ICD-10-CM

## 2020-06-29 RX ORDER — DEXTROAMPHETAMINE SACCHARATE, AMPHETAMINE ASPARTATE MONOHYDRATE, DEXTROAMPHETAMINE SULFATE AND AMPHETAMINE SULFATE 7.5; 7.5; 7.5; 7.5 MG/1; MG/1; MG/1; MG/1
30 CAPSULE, EXTENDED RELEASE ORAL EVERY MORNING
Qty: 30 CAPSULE | Refills: 0 | Status: SHIPPED | OUTPATIENT
Start: 2020-06-29 | End: 2020-07-30 | Stop reason: SDUPTHER

## 2020-07-01 ENCOUNTER — TELEPHONE (OUTPATIENT)
Dept: INTERNAL MEDICINE | Facility: CLINIC | Age: 60
End: 2020-07-01

## 2020-07-01 NOTE — TELEPHONE ENCOUNTER
Patient calling and states she has been moving all weekend and would like a steroid dose pack called in to help alleviate some pain. Verified Pablo on [address] [facility].    Patient call back is [phone].

## 2020-07-02 ENCOUNTER — OFFICE VISIT (OUTPATIENT)
Dept: INTERNAL MEDICINE | Facility: CLINIC | Age: 60
End: 2020-07-02

## 2020-07-02 DIAGNOSIS — M79.671 FOOT PAIN, BILATERAL: Primary | ICD-10-CM

## 2020-07-02 DIAGNOSIS — M79.672 FOOT PAIN, BILATERAL: Primary | ICD-10-CM

## 2020-07-02 PROCEDURE — 99442 PR PHYS/QHP TELEPHONE EVALUATION 11-20 MIN: CPT | Performed by: INTERNAL MEDICINE

## 2020-07-02 RX ORDER — METHYLPREDNISOLONE 4 MG/1
TABLET ORAL
Qty: 21 TABLET | Refills: 0 | Status: SHIPPED | OUTPATIENT
Start: 2020-07-02 | End: 2021-02-12 | Stop reason: SDUPTHER

## 2020-07-02 NOTE — PROGRESS NOTES
Subjective     Angely Mathias is a 60 y.o. female who presents with   Chief Complaint   Patient presents with   • Foot Pain       History of Present Illness     You have chosen to receive care through a telephone visit. Do you consent to use a telephone visit for your medical care today? Yes  Time of visit is 12 minutes.     C/o foot pain.  Going on for several days.  She attributes to move.  On her feet all day long.  No injury.      Review of Systems    The following portions of the patient's history were reviewed and updated as appropriate: allergies, current medications and problem list.    Patient Active Problem List    Diagnosis Date Noted   • Intervertebral disc disorder with radiculopathy of lumbar region 02/19/2020   • Other specified glaucoma 06/11/2019   • Chronic bronchitis (CMS/HCC) 11/28/2018   • Attention deficit hyperactivity disorder (ADHD), predominantly inattentive type 10/28/2016     Note Last Updated: 10/28/2016     Neuropsych testing in 10/2016 with [facility].       • Lung nodule 10/18/2016     Note Last Updated: 11/28/2018     Stable 10/16-10/18     • Atopic rhinitis 02/08/2016   • Hyperlipidemia 02/08/2016     Note Last Updated: 11/28/2018     Description: 1/2014.  10 year risk ASCVD was 1.7 %; 2.1% 10 year risk.       • Chronic low back pain 02/08/2016     Note Last Updated: 2/8/2016     Description: chronic LBP.  Uses hydrocodone three times a week     • Neck pain 02/08/2016   • Persistent insomnia 02/08/2016   • Overweight 02/08/2016       Current Outpatient Medications on File Prior to Visit   Medication Sig Dispense Refill   • amphetamine-dextroamphetamine XR (ADDERALL XR) 30 MG 24 hr capsule Take 1 capsule by mouth Every Morning 30 capsule 0   • azelastine (ASTELIN) 0.1 % nasal spray      • bimatoprost (LUMIGAN) 0.03 % ophthalmic drops      • Calcium Carbonate-Vitamin D (CALCIUM + D PO) Take by mouth.     • COMBIVENT RESPIMAT  MCG/ACT inhaler      • DULERA 200-5 MCG/ACT  inhaler      • DYMISTA 137-50 MCG/ACT suspension      • estradiol (VAGIFEM) 10 MCG tablet vaginal tablet      • HYDROcod Polst-CPM Polst ER (TUSSIONEX PENNKINETIC ER) 10-8 MG/5ML ER suspension Take 5 mL by mouth Every 12 (Twelve) Hours As Needed for Cough. 115 mL 0   • HYDROcodone-acetaminophen (NORCO)  MG per tablet Take 1 tablet by mouth Every 6 (Six) Hours As Needed for Moderate Pain . 50 tablet 0   • naproxen (NAPROSYN) 500 MG tablet TAKE ONE TABLET BY MOUTH TWICE A DAY WITH MEALS 60 tablet 0   • norethindrone-ethinyl estradiol (FEMHRT 1/5) 1-5 MG-MCG tablet Take by mouth.     • [DISCONTINUED] bimatoprost (LUMIGAN) 0.01 % ophthalmic drops 1 drop Every Night.     • [DISCONTINUED] methylPREDNISolone (MEDROL, BOYD,) 4 MG tablet Take as directed on package instructions. 21 tablet 0     No current facility-administered medications on file prior to visit.        Objective     There were no vitals taken for this visit.    Physical Exam   Pulmonary/Chest: Effort normal.   Psychiatric: She has a normal mood and affect. Her behavior is normal.       Assessment/Plan   Angely was seen today for foot pain.    Diagnoses and all orders for this visit:    Foot pain, bilateral        Discussion    Patient presents with bilateral foot pain secondary to greatly increased activity.  I discussed with the patient a trial of conservative management with:   rest and medrol dose boyd.  Let me know if not feeling better over the next several weeks or if there is any change in symptoms.         No future appointments.

## 2020-07-27 ENCOUNTER — TELEPHONE (OUTPATIENT)
Dept: INTERNAL MEDICINE | Facility: CLINIC | Age: 60
End: 2020-07-27

## 2020-07-27 DIAGNOSIS — M54.50 LOW BACK PAIN, UNSPECIFIED BACK PAIN LATERALITY, UNSPECIFIED CHRONICITY, UNSPECIFIED WHETHER SCIATICA PRESENT: ICD-10-CM

## 2020-07-27 RX ORDER — HYDROCODONE BITARTRATE AND ACETAMINOPHEN 10; 325 MG/1; MG/1
1 TABLET ORAL EVERY 6 HOURS PRN
Qty: 50 TABLET | Refills: 0 | Status: SHIPPED | OUTPATIENT
Start: 2020-07-27 | End: 2020-11-24 | Stop reason: SDUPTHER

## 2020-07-30 DIAGNOSIS — F90.0 ATTENTION DEFICIT HYPERACTIVITY DISORDER (ADHD), PREDOMINANTLY INATTENTIVE TYPE: ICD-10-CM

## 2020-07-30 RX ORDER — DEXTROAMPHETAMINE SACCHARATE, AMPHETAMINE ASPARTATE MONOHYDRATE, DEXTROAMPHETAMINE SULFATE AND AMPHETAMINE SULFATE 7.5; 7.5; 7.5; 7.5 MG/1; MG/1; MG/1; MG/1
30 CAPSULE, EXTENDED RELEASE ORAL EVERY MORNING
Qty: 30 CAPSULE | Refills: 0 | Status: SHIPPED | OUTPATIENT
Start: 2020-07-30 | End: 2020-08-31 | Stop reason: SDUPTHER

## 2020-08-12 ENCOUNTER — TELEPHONE (OUTPATIENT)
Dept: INTERNAL MEDICINE | Facility: CLINIC | Age: 60
End: 2020-08-12

## 2020-08-12 NOTE — TELEPHONE ENCOUNTER
Advise I can order with a video visit which I could do today or she could go to [facility] and be seen by doctor at [facility].  Also she can go to [email or web address] to schedule a test.  MALLORY

## 2020-08-12 NOTE — TELEPHONE ENCOUNTER
----- Message from Catalina Umana MA sent at 8/12/2020 10:48 AM EDT -----  Regarding: FW: Non-Urgent Medical Question  Contact: [phone]      ----- Message -----  From: Angely Mathias  Sent: 8/12/2020  10:41 AM EDT  To: Shyla Espinosa [facility]  Subject: Non-Urgent Medical Question                      My father in law who is 91 was here w/MIL for 2 days. My  drove them Here from [place] and then to [place]. ULISES was Admitted in hosp.  In [place] for what they think may have been From new pacemaker issues. The hosp. test all pts. and told him 3 days later he’s positive for Covid. He has no illness s/s and His wife is negative. My hubby has not been ill either. My fatHer in law Went to health dept for 2nd test. Waiting results.   We have a new grandson and I an wondering should i be tested to be sure I’m not an asymptomatic positive.   I can’t seem to find an Open appointment. Do u guys test in your office? Or would you write me aN order for test at hospital? Do you think this is even necessary?  Appreciate your input.   Thanks, Angely

## 2020-08-31 DIAGNOSIS — F90.0 ATTENTION DEFICIT HYPERACTIVITY DISORDER (ADHD), PREDOMINANTLY INATTENTIVE TYPE: ICD-10-CM

## 2020-08-31 RX ORDER — DEXTROAMPHETAMINE SACCHARATE, AMPHETAMINE ASPARTATE MONOHYDRATE, DEXTROAMPHETAMINE SULFATE AND AMPHETAMINE SULFATE 7.5; 7.5; 7.5; 7.5 MG/1; MG/1; MG/1; MG/1
30 CAPSULE, EXTENDED RELEASE ORAL EVERY MORNING
Qty: 30 CAPSULE | Refills: 0 | Status: SHIPPED | OUTPATIENT
Start: 2020-08-31 | End: 2020-10-05 | Stop reason: SDUPTHER

## 2020-10-05 DIAGNOSIS — F90.0 ATTENTION DEFICIT HYPERACTIVITY DISORDER (ADHD), PREDOMINANTLY INATTENTIVE TYPE: ICD-10-CM

## 2020-10-05 RX ORDER — DEXTROAMPHETAMINE SACCHARATE, AMPHETAMINE ASPARTATE MONOHYDRATE, DEXTROAMPHETAMINE SULFATE AND AMPHETAMINE SULFATE 7.5; 7.5; 7.5; 7.5 MG/1; MG/1; MG/1; MG/1
30 CAPSULE, EXTENDED RELEASE ORAL EVERY MORNING
Qty: 30 CAPSULE | Refills: 0 | Status: SHIPPED | OUTPATIENT
Start: 2020-10-05 | End: 2020-11-04 | Stop reason: SDUPTHER

## 2020-11-04 DIAGNOSIS — F90.0 ATTENTION DEFICIT HYPERACTIVITY DISORDER (ADHD), PREDOMINANTLY INATTENTIVE TYPE: ICD-10-CM

## 2020-11-05 RX ORDER — DEXTROAMPHETAMINE SACCHARATE, AMPHETAMINE ASPARTATE MONOHYDRATE, DEXTROAMPHETAMINE SULFATE AND AMPHETAMINE SULFATE 7.5; 7.5; 7.5; 7.5 MG/1; MG/1; MG/1; MG/1
30 CAPSULE, EXTENDED RELEASE ORAL EVERY MORNING
Qty: 30 CAPSULE | Refills: 0 | Status: SHIPPED | OUTPATIENT
Start: 2020-11-05 | End: 2020-12-04 | Stop reason: SDUPTHER

## 2020-11-20 ENCOUNTER — APPOINTMENT (OUTPATIENT)
Dept: WOMENS IMAGING | Facility: HOSPITAL | Age: 60
End: 2020-11-20

## 2020-11-20 PROCEDURE — 77063 BREAST TOMOSYNTHESIS BI: CPT | Performed by: RADIOLOGY

## 2020-11-20 PROCEDURE — 77067 SCR MAMMO BI INCL CAD: CPT | Performed by: RADIOLOGY

## 2020-11-24 DIAGNOSIS — M54.50 LOW BACK PAIN, UNSPECIFIED BACK PAIN LATERALITY, UNSPECIFIED CHRONICITY, UNSPECIFIED WHETHER SCIATICA PRESENT: ICD-10-CM

## 2020-11-24 RX ORDER — HYDROCODONE BITARTRATE AND ACETAMINOPHEN 10; 325 MG/1; MG/1
1 TABLET ORAL EVERY 6 HOURS PRN
Qty: 50 TABLET | Refills: 0 | Status: SHIPPED | OUTPATIENT
Start: 2020-11-24 | End: 2021-03-07 | Stop reason: SDUPTHER

## 2020-12-04 DIAGNOSIS — F90.0 ATTENTION DEFICIT HYPERACTIVITY DISORDER (ADHD), PREDOMINANTLY INATTENTIVE TYPE: ICD-10-CM

## 2020-12-06 RX ORDER — DEXTROAMPHETAMINE SACCHARATE, AMPHETAMINE ASPARTATE MONOHYDRATE, DEXTROAMPHETAMINE SULFATE AND AMPHETAMINE SULFATE 7.5; 7.5; 7.5; 7.5 MG/1; MG/1; MG/1; MG/1
30 CAPSULE, EXTENDED RELEASE ORAL EVERY MORNING
Qty: 30 CAPSULE | Refills: 0 | Status: SHIPPED | OUTPATIENT
Start: 2020-12-06 | End: 2021-01-07 | Stop reason: SDUPTHER

## 2021-01-03 ENCOUNTER — APPOINTMENT (OUTPATIENT)
Dept: VACCINE CLINIC | Facility: HOSPITAL | Age: 61
End: 2021-01-03

## 2021-01-05 ENCOUNTER — IMMUNIZATION (OUTPATIENT)
Dept: VACCINE CLINIC | Facility: HOSPITAL | Age: 61
End: 2021-01-05

## 2021-01-05 PROCEDURE — 0001A: CPT | Performed by: INTERNAL MEDICINE

## 2021-01-05 PROCEDURE — 91300 HC SARSCOV02 VAC 30MCG/0.3ML IM: CPT | Performed by: INTERNAL MEDICINE

## 2021-01-07 DIAGNOSIS — F90.0 ATTENTION DEFICIT HYPERACTIVITY DISORDER (ADHD), PREDOMINANTLY INATTENTIVE TYPE: ICD-10-CM

## 2021-01-07 RX ORDER — DEXTROAMPHETAMINE SACCHARATE, AMPHETAMINE ASPARTATE MONOHYDRATE, DEXTROAMPHETAMINE SULFATE AND AMPHETAMINE SULFATE 7.5; 7.5; 7.5; 7.5 MG/1; MG/1; MG/1; MG/1
30 CAPSULE, EXTENDED RELEASE ORAL EVERY MORNING
Qty: 30 CAPSULE | Refills: 0 | Status: SHIPPED | OUTPATIENT
Start: 2021-01-07 | End: 2021-02-11 | Stop reason: SDUPTHER

## 2021-01-28 ENCOUNTER — IMMUNIZATION (OUTPATIENT)
Dept: VACCINE CLINIC | Facility: HOSPITAL | Age: 61
End: 2021-01-28

## 2021-01-28 PROCEDURE — 91300 HC SARSCOV02 VAC 30MCG/0.3ML IM: CPT | Performed by: INTERNAL MEDICINE

## 2021-01-28 PROCEDURE — 0002A: CPT | Performed by: INTERNAL MEDICINE

## 2021-02-11 DIAGNOSIS — F90.0 ATTENTION DEFICIT HYPERACTIVITY DISORDER (ADHD), PREDOMINANTLY INATTENTIVE TYPE: ICD-10-CM

## 2021-02-11 RX ORDER — DEXTROAMPHETAMINE SACCHARATE, AMPHETAMINE ASPARTATE MONOHYDRATE, DEXTROAMPHETAMINE SULFATE AND AMPHETAMINE SULFATE 7.5; 7.5; 7.5; 7.5 MG/1; MG/1; MG/1; MG/1
30 CAPSULE, EXTENDED RELEASE ORAL EVERY MORNING
Qty: 30 CAPSULE | Refills: 0 | Status: SHIPPED | OUTPATIENT
Start: 2021-02-11 | End: 2021-03-09 | Stop reason: SDUPTHER

## 2021-02-12 RX ORDER — METHYLPREDNISOLONE 4 MG/1
TABLET ORAL
Qty: 21 TABLET | Refills: 0 | Status: SHIPPED | OUTPATIENT
Start: 2021-02-12 | End: 2021-03-05

## 2021-02-12 RX ORDER — TIZANIDINE 2 MG/1
2 TABLET ORAL EVERY 8 HOURS PRN
Qty: 30 TABLET | Refills: 0 | Status: SHIPPED | OUTPATIENT
Start: 2021-02-12 | End: 2021-03-05 | Stop reason: SDUPTHER

## 2021-02-16 RX ORDER — PREDNISONE 50 MG/1
50 TABLET ORAL DAILY
Qty: 5 TABLET | Refills: 0 | Status: SHIPPED | OUTPATIENT
Start: 2021-02-16 | End: 2021-03-05

## 2021-03-05 ENCOUNTER — OFFICE VISIT (OUTPATIENT)
Dept: INTERNAL MEDICINE | Facility: CLINIC | Age: 61
End: 2021-03-05

## 2021-03-05 VITALS
HEIGHT: 69 IN | DIASTOLIC BLOOD PRESSURE: 82 MMHG | WEIGHT: 207 LBS | OXYGEN SATURATION: 100 % | BODY MASS INDEX: 30.66 KG/M2 | HEART RATE: 108 BPM | SYSTOLIC BLOOD PRESSURE: 120 MMHG

## 2021-03-05 DIAGNOSIS — M25.511 RIGHT SHOULDER PAIN, UNSPECIFIED CHRONICITY: ICD-10-CM

## 2021-03-05 DIAGNOSIS — M54.42 ACUTE LEFT-SIDED LOW BACK PAIN WITH LEFT-SIDED SCIATICA: Primary | ICD-10-CM

## 2021-03-05 PROCEDURE — 99214 OFFICE O/P EST MOD 30 MIN: CPT | Performed by: INTERNAL MEDICINE

## 2021-03-05 PROCEDURE — 73030 X-RAY EXAM OF SHOULDER: CPT | Performed by: INTERNAL MEDICINE

## 2021-03-05 PROCEDURE — 72110 X-RAY EXAM L-2 SPINE 4/>VWS: CPT | Performed by: INTERNAL MEDICINE

## 2021-03-05 RX ORDER — PREDNISONE 50 MG/1
50 TABLET ORAL DAILY
Qty: 5 TABLET | Refills: 0 | Status: SHIPPED | OUTPATIENT
Start: 2021-03-05 | End: 2021-03-29

## 2021-03-05 RX ORDER — TIZANIDINE 4 MG/1
4 TABLET ORAL EVERY 6 HOURS PRN
Qty: 30 TABLET | Refills: 5 | Status: SHIPPED | OUTPATIENT
Start: 2021-03-05 | End: 2021-11-01 | Stop reason: SDUPTHER

## 2021-03-05 NOTE — PROGRESS NOTES
Subjective     Angely Mathias is a 60 y.o. female who presents with   Chief Complaint   Patient presents with   • Back Pain       History of Present Illness     Two weeks of back started after coughing while bending over.  Severe pain developed in the left side and radiates to the left leg to the mid thigh.  Worse in the morning.  PT at home of little help.  She is using norco, advil, and a medrol dose no followed by prednisone.  She is worse again after the steroids.  This pain is completely new from previous.      Review of Systems   Constitutional: Negative for fever.   Respiratory: Negative.    Cardiovascular: Negative.    Musculoskeletal:        Right shoulder pain       The following portions of the patient's history were reviewed and updated as appropriate: allergies, current medications and problem list.    Patient Active Problem List    Diagnosis Date Noted   • Intervertebral disc disorder with radiculopathy of lumbar region 02/19/2020   • Other specified glaucoma 06/11/2019   • Chronic bronchitis (CMS/HCC) 11/28/2018   • Attention deficit hyperactivity disorder (ADHD), predominantly inattentive type 10/28/2016     Note Last Updated: 10/28/2016     Neuropsych testing in 10/2016 with [facility].       • Lung nodule 10/18/2016     Note Last Updated: 11/28/2018     Stable 10/16-10/18     • Atopic rhinitis 02/08/2016   • Hyperlipidemia 02/08/2016     Note Last Updated: 11/28/2018     Description: 1/2014.  10 year risk ASCVD was 1.7 %; 2.1% 10 year risk.       • Chronic low back pain 02/08/2016     Note Last Updated: 2/8/2016     Description: chronic LBP.  Uses hydrocodone three times a week     • Neck pain 02/08/2016   • Persistent insomnia 02/08/2016   • Overweight 02/08/2016       Current Outpatient Medications on File Prior to Visit   Medication Sig Dispense Refill   • amphetamine-dextroamphetamine XR (ADDERALL XR) 30 MG 24 hr capsule Take 1 capsule by mouth Every Morning 30 capsule 0   •  azelastine (ASTELIN) 0.1 % nasal spray      • Azelastine-Fluticasone 137-50 MCG/ACT suspension Spray 1 spray in each nostril twice daily 23 g 11   • bimatoprost (LUMIGAN) 0.03 % ophthalmic drops Instill 1 drop into each eye every night at bedtime 2.5 mL 3   • budesonide-formoterol (SYMBICORT) 80-4.5 MCG/ACT inhaler Inhale 2 puffs by mouth twice a day 10.2 g 10   • Calcium Carbonate-Vitamin D (CALCIUM + D PO) Take by mouth.     • COMBIVENT RESPIMAT  MCG/ACT inhaler      • DULERA 200-5 MCG/ACT inhaler      • DYMISTA 137-50 MCG/ACT suspension      • estradiol (VAGIFEM) 10 MCG tablet vaginal tablet      • estradiol (VAGIFEM) 10 MCG tablet vaginal tablet Insert 1 tablet into the vagina 2 (Two) Times a Week. 24 tablet 3   • HYDROcodone-acetaminophen (NORCO)  MG per tablet Take 1 tablet by mouth Every 6 (Six) Hours As Needed for Moderate Pain . 50 tablet 0   • naproxen (NAPROSYN) 500 MG tablet TAKE ONE TABLET BY MOUTH TWICE A DAY WITH MEALS 60 tablet 0   • norethindrone-ethinyl estradiol (FEMHRT 1/5) 1-5 MG-MCG tablet Take by mouth.     • norethindrone-ethinyl estradiol (Jinteli) 1-5 MG-MCG tablet Take 1 tablet by mouth Daily. 90 tablet 3   • [DISCONTINUED] bimatoprost (LUMIGAN) 0.03 % ophthalmic drops      • [DISCONTINUED] tiZANidine (ZANAFLEX) 2 MG tablet Take 1 tablet by mouth Every 8 (Eight) Hours As Needed for Muscle Spasms. 30 tablet 0   • [DISCONTINUED] HYDROcod Polst-CPM Polst ER (TUSSIONEX PENNKINETIC ER) 10-8 MG/5ML ER suspension Take 5 mL by mouth Every 12 (Twelve) Hours As Needed for Cough. 115 mL 0   • [DISCONTINUED] methylPREDNISolone (MEDROL) 4 MG dose pack Take as directed on package instructions. 21 tablet 0   • [DISCONTINUED] mometasone-formoterol (DULERA 100) 100-5 MCG/ACT inhaler Inhale 2 puffs by mouth twice daily 13 g 10   • [DISCONTINUED] predniSONE (DELTASONE) 50 MG tablet Take 1 tablet by mouth Daily. 5 tablet 0     No current facility-administered medications on file prior to visit.   "      Objective     /82   Pulse 108   Ht 175.3 cm (69.02\")   Wt 93.9 kg (207 lb)   SpO2 100%   BMI 30.55 kg/m²     Physical Exam  Constitutional:       Appearance: She is well-developed.   HENT:      Head: Normocephalic and atraumatic.   Pulmonary:      Effort: Pulmonary effort is normal.   Musculoskeletal:      Right shoulder: She exhibits decreased range of motion and tenderness. She exhibits no bony tenderness and no deformity.   Neurological:      Mental Status: She is alert and oriented to person, place, and time.      Sensory: Sensation is intact.      Motor: Motor function is intact.      Gait: Gait is intact.      Deep Tendon Reflexes:      Reflex Scores:       Patellar reflexes are 0 on the right side and 0 on the left side.       Achilles reflexes are 0 on the right side and 0 on the left side.  Psychiatric:         Behavior: Behavior normal.     X-rays of the l/s psine  performed today for following indication:   pain.  X-ray reveal ddd.  No change from 6/11/2019.  X-ray sent to radiology for official interpretation and findings.      X-rays of the right shoulder  performed today for following indication:   pain.  X-ray reveal AC joint OA.  There is no available x-ray for comparison.  X-ray sent to radiology for official interpretation and findings.          Assessment/Plan   Diagnoses and all orders for this visit:    1. Acute left-sided low back pain with left-sided sciatica (Primary)  -     XR Spine Lumbar Complete 4+VW  -     Ambulatory Referral to Physical Therapy Evaluate and treat  -     MRI Lumbar Spine Without Contrast; Future    2. Right shoulder pain, unspecified chronicity  -     XR Shoulder 2+ View Right (In Office)    Other orders  -     predniSONE (DELTASONE) 50 MG tablet; Take 1 tablet by mouth Daily.  Dispense: 5 tablet; Refill: 0  -     tiZANidine (ZANAFLEX) 4 MG tablet; Take 1 tablet by mouth Every 6 (Six) Hours As Needed for Muscle Spasms.  Dispense: 30 tablet; Refill: " 5        Discussion    Patient presents primary with two weeks of unrelenting back pain despite home based therapy based on previous back pain referrals, pain relievers, aleve, and two rounds of steroids.  I discussed with the patient a trial of conservative management with:   Muscle relaxer and prednisone.  Let me know if not feeling better over the next several weeks or if there is any change in symptoms.  Further evaluate with MRI of the L/S spine.           Future Appointments   Date Time Provider Department Center   3/18/2021 10:10 AM [facility]   3/29/2021  8:15 AM Cynthia Reid MD [facility]

## 2021-03-07 DIAGNOSIS — M54.50 LOW BACK PAIN, UNSPECIFIED BACK PAIN LATERALITY, UNSPECIFIED CHRONICITY, UNSPECIFIED WHETHER SCIATICA PRESENT: ICD-10-CM

## 2021-03-08 RX ORDER — HYDROCODONE BITARTRATE AND ACETAMINOPHEN 10; 325 MG/1; MG/1
1 TABLET ORAL EVERY 6 HOURS PRN
Qty: 50 TABLET | Refills: 0 | Status: SHIPPED | OUTPATIENT
Start: 2021-03-08 | End: 2021-04-26 | Stop reason: SDUPTHER

## 2021-03-09 ENCOUNTER — HOSPITAL ENCOUNTER (OUTPATIENT)
Dept: PHYSICAL THERAPY | Facility: HOSPITAL | Age: 61
Setting detail: THERAPIES SERIES
Discharge: HOME | End: 2021-03-09

## 2021-03-09 DIAGNOSIS — R29.3 POOR POSTURE: ICD-10-CM

## 2021-03-09 DIAGNOSIS — F90.0 ATTENTION DEFICIT HYPERACTIVITY DISORDER (ADHD), PREDOMINANTLY INATTENTIVE TYPE: ICD-10-CM

## 2021-03-09 DIAGNOSIS — M54.42 ACUTE LEFT-SIDED LOW BACK PAIN WITH LEFT-SIDED SCIATICA: Primary | ICD-10-CM

## 2021-03-09 DIAGNOSIS — M79.605 PAIN OF LEFT LOWER EXTREMITY: ICD-10-CM

## 2021-03-09 PROCEDURE — 97162 PT EVAL MOD COMPLEX 30 MIN: CPT

## 2021-03-09 PROCEDURE — 97110 THERAPEUTIC EXERCISES: CPT

## 2021-03-09 RX ORDER — DEXTROAMPHETAMINE SACCHARATE, AMPHETAMINE ASPARTATE MONOHYDRATE, DEXTROAMPHETAMINE SULFATE AND AMPHETAMINE SULFATE 7.5; 7.5; 7.5; 7.5 MG/1; MG/1; MG/1; MG/1
30 CAPSULE, EXTENDED RELEASE ORAL EVERY MORNING
Qty: 30 CAPSULE | Refills: 0 | Status: SHIPPED | OUTPATIENT
Start: 2021-03-09 | End: 2021-04-05 | Stop reason: SDUPTHER

## 2021-03-09 NOTE — THERAPY EVALUATION
Outpatient Physical Therapy Ortho Initial Evaluation  [facility]     Patient Name: Angely Mathias  : 1960  MRN: [id number]  Today's Date: 3/9/2021      Visit Date: 2021    Patient Active Problem List   Diagnosis   • Atopic rhinitis   • Hyperlipidemia   • Chronic low back pain   • Neck pain   • Persistent insomnia   • Overweight   • Lung nodule   • Attention deficit hyperactivity disorder (ADHD), predominantly inattentive type   • Chronic bronchitis (CMS/HCC)   • Other specified glaucoma   • Intervertebral disc disorder with radiculopathy of lumbar region        Past Medical History:   Diagnosis Date   • Acute bronchitis    • Acute pain    • ADHD (attention deficit hyperactivity disorder)     years ago. worse w/ job type change   • Allergic     since i was little   • Blood tests for routine general physical examination    • Cholelithiasis     resolved with GB removal   • Diarrhea    • Glaucoma     treated w/ eye drops   • Headache     ocular and allergy induced   • Hyperlipidemia     noted on past lab work   • Low back pain     occasional chronic   • Microscopic hematuria    • Obesity     at times   • Visual impairment 1965    wear contacts/glasses        Past Surgical History:   Procedure Laterality Date   • ABDOMINOPLASTY     • BREAST SURGERY      Enlargement Procedure   • CHOLECYSTECTOMY     • COLONOSCOPY     • COSMETIC SURGERY      breast aug 1989 and    • DIAGNOSTIC LAPAROSCOPY EXPLORATORY LAPAROTOMY     • DILATATION AND CURETTAGE     • EPIDURAL BLOCK     • EYE SURGERY      RK    • OTHER SURGICAL HISTORY      Vaginal Sling Operation for Stress Incontinence   • TUBAL ABDOMINAL LIGATION     • WRIST SURGERY         Visit Dx:     ICD-10-CM ICD-9-CM   1. Acute left-sided low back pain with left-sided sciatica  M54.42 724.2     724.3   2. Pain of left lower extremity  M79.605 729.5   3. Poor posture  R29.3 781.92         Patient History     Row Name  03/09/21 1300 03/09/21 1003          History    Chief Complaint  --  Difficulty with daily activities;Pain;Other 1 (comment)  (Pended)   (Patient-Rptd)   -patient     Hx Chief Complaint Comment 1   --  low back pain x years, usually right side. T 12 fx years ago. DJD low back. currently burning left low back and hip/leg.  (Pended)   (Patient-Rptd)   -patient     Type of Pain  --  Back pain;Hip pain;Lower Extremity / Leg  (Pended)   (Patient-Rptd)   -patient     Date Current Problem(s) Began  --  02/11/21  (Pended)   (Patient-Rptd)   -patient     Brief Description of Current Complaint  Pt. Presents to therapy with reports of onset of back pain that began when she was bent over coughing and when she stood up she was having L sided back and LE pain. This happened the 2/11/2021 and she is currently on her third round of steroids which has helped so she is able to get out of bed but does not eliminate the pain. Pt. Notes the mornings are the worst, it gets somewhat better during the day but she states she does take medication as needed. Pt. States she has had an X-Ray that indicates her lumbar spine is abnormally flat and therefore MD ordered MRI. 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  -MH  was coughing and leaned forward resulting in extreme burning pain L low back and radiated across left hip and leg (front and back)  (Pended)   (Patient-Rptd)   -patient     Previous treatment for THIS PROBLEM  Medication steroids  -MH  --     Patient/Caregiver Goals  --  Relieve pain;Return to prior level of function;Know what to do to help the symptoms  (Pended)   (Patient-Rptd)   -patient     Hand Dominance  --  right-handed  (Pended)   (Patient-Rptd)   -patient     Occupation/sports/leisure activities  --  analyst/gardening/paint  (Pended)   (Patient-Rptd)   -patient     What clinical tests have you had for this problem?  --  X-ray;MRI;Other 2  (comment)  (Pended)   (Patient-Rptd)   -patient     Other Clinical Tests  --  another MRI scheduled on 17th Mar  (Pended)   (Patient-Rptd)   -patient     Are you or can you be pregnant  --  No  (Pended)   (Patient-Rptd)   -patient        Pain     Pain Location  --  Back  -     Pain at Present  --  4  -MH     Pain at Best  --  4  -MH     Pain at Worst  --  10  -MH     Pain Frequency  --  Constant/continuous  -     Pain Description  --  Burning  -     What Performance Factors Make the Current Problem(s) WORSE?  --  getting up in AM, donning pants  -MH     What Performance Factors Make the Current Problem(s) BETTER?  --  steroids  -     Tolerance Time- Standing  --  15 minutes  -     Tolerance Time- Sitting  --  as soon she sits it hurts  -MH     Is your sleep disturbed?  --  Yes  -MH     What position do you sleep in?  --  Right sidelying;Supine  -     Difficulties at work?  --  yes  -     Difficulties with ADL's?  --  yes  -     Difficulties with recreational activities?  --  painting, garden  -        Fall Risk Assessment    Any falls in the past year:  --  No  (Pended)   (Patient-Rptd)   -patient        Services    Prior Rehab/Home Health Experiences  --  No  (Pended)   (Patient-Rptd)   -patient     Are you currently receiving Home Health services  --  No  (Pended)   (Patient-Rptd)   -patient     Do you plan to receive Home Health services in the near future  --  No  (Pended)   (Patient-Rptd)   -patient        Daily Activities    Primary Language  --  English  (Pended)   (Patient-Rptd)   -patient     Are you able to read  --  Yes  (Pended)   (Patient-Rptd)   -patient     Are you able to write  --  Yes  (Pended)   (Patient-Rptd)   -patient     How does patient learn best?  --  Demonstration  (Pended)   (Patient-Rptd)   -patient     Pt Participated in POC and Goals  --  Yes  -        Safety    Are you being hurt, hit, or frightened by anyone at home or in your life?  --  No  (Pended)    (Patient-Rptd)   -patient     Are you being neglected by a caregiver  --  No  (Pended)   (Patient-Rptd)   -patient     Have you had any of the following issues with  --  N/A  (Pended)   (Patient-Rptd)   -patient       User Key  (r) = Recorded By, (t) = Taken By, (c) = Cosigned By    Initials Name Provider Type    MH Roxanna Bryan, PT Physical Therapist    patient Angely FRANCO Mey --          PT Ortho     Row Name 03/09/21 1300       Transfers    34362 - PT Therapeutic Activity Minutes  --  -MH    Row Name 03/09/21 1200       Posture/Observations    Alignment Options  Iliac crests;Lumbar lordosis  -    Lumbar lordosis  Bilateral:;Decreased  -    Iliac crests  Bilateral:;Normal  -MH       Quarter Clearing    Quarter Clearing  Lower Quarter Clearing  -       DTR- Lower Quarter Clearing    Patellar tendon (L2-4)  Bilateral:;1- Minimal response  -    Achilles tendon (S1-2)  Bilateral:;0- No response  -       Neural Tension Signs- Lower Quarter Clearing    Slump  Right:;Negative;Left:;Positive  -    SLR  Left:;Positive;Right:;Negative  -    Prone knee flexion  Bilateral:;Negative  -       Sensory Screen for Light Touch- Lower Quarter Clearing    L1 (inguinal area)  Bilateral:;Intact  -    L2 (anterior mid thigh)  Bilateral:;Intact  -    L3 (distal anterior thigh)  Bilateral:;Intact  -    L4 (medial lower leg/foot)  Bilateral:;Intact  -    L5 (lateral lower leg/great toe)  Bilateral:;Intact  -       Myotomal Screen- Lower Quarter Clearing    Hip flexion (L2)  Left:;4- (Good -);Right:;5 (Normal) L painful  -    Knee extension (L3)  Left:;4+ (Good +);Right:;5 (Normal)  -    Ankle DF (L4)  Bilateral:;5 (Normal)  -    Ankle PF (S1)  Bilateral:;5 (Normal)  -    Knee flexion (S2)  Bilateral:;5 (Normal)  -       Lumbar ROM Screen- Lower Quarter Clearing    Lumbar Flexion  Normal painful  -    Lumbar Extension  Impaired painful 25%  -    Lumbar Lateral Flexion  Normal painful L  -MH    Lumbar  Rotation  Impaired painful limited with L  -MH       Special Tests/Palpation    Special Tests/Palpation  Lumbar/SI  -MH       Lumbosacral Accessory Motions    Lumbosacral Accessory Motions Tested?  Yes  -MH    PA Glide- L3  Hypomobile  -MH    PA Glide- L4  Hypomobile  -MH    PA Glide- L5  Hypomobile  -MH    PA glide- Sacral base  Hypomobile  -MH       Lumbar/SI Special Tests    PEG (hip vs. SI Dysfunction)  Negative  -    FAIR Test (Piriformis Syndrome)  Right:;Positive  -       Lumbosacral Palpation    Lumbosacral Palpation?  Yes  -MH    SI  Right:;Tender  -    Piriformis  Right:;Tender;Guarded/taut  -MH    Quadratus Lumborum  Guarded/taut  -MH    Erector Spinae (Paraspinals)  Guarded/taut  -MH    Hamstring  Right:;Guarded/taut  -MH       General ROM    GENERAL ROM COMMENTS  B hip and knee WFL  -MH       MMT (Manual Muscle Testing)    Rt Lower Ext  Rt Hip Extension;Rt Hip ABduction  -MH    Lt Lower Ext  Lt Hip Extension;Lt Hip ABduction  -MH       MMT Right Lower Ext    Rt Hip Extension MMT, Gross Movement  (4-/5) good minus  -MH    Rt Hip ABduction MMT, Gross Movement  (4-/5) good minus  -MH       MMT Left Lower Ext    Lt Hip Extension MMT, Gross Movement  (4-/5) good minus  -MH    Lt Hip ABduction MMT, Gross Movement  (4-/5) good minus  -MH       Sensation    Sensation WNL?  WNL  -MH       Flexibility    Flexibility Tested?  Lower Extremity  -       Lower Extremity Flexibility    Hamstrings  Bilateral:;Moderately limited  -    Quadriceps  Bilateral:;Moderately limited (+) Ely's Test  -      User Key  (r) = Recorded By, (t) = Taken By, (c) = Cosigned By    Initials Name Provider Type    Roxanna Whitman PT Physical Therapist                      Therapy Education  Education Details: Educated on PT role and POC; discussed expectations/timeframe for healing; Access Code: [email or web address]  Given: HEP, Symptoms/condition management, Pain management, Posture/body mechanics, Mobility training  Program: New  How  Provided: Verbal, Demonstration, Written  Provided to: Patient  Level of Understanding: Teach back education performed, Verbalized, Demonstrated     PT OP Goals     Row Name 03/09/21 1300          PT Short Term Goals    STG Date to Achieve  03/23/21  -     STG 1  Pt. Will be independent with initial HEP to improve self-management of condition.  -     STG 1 Progress  New  [facility]     STG 2  Pt. Will demonstrate proper log roll technique without cueing to reduce lumbar strain and preserve spine.  -     STG 2 Progress  New  [facility]     STG 3  Pt. Will report 25% improvement in pain and centralization of symptoms to improve QOL.  -     STG 3 Progress  New  [facility]        Long Term Goals    LTG Date to Achieve  04/09/21  -     LTG 1  Pt. Will be independent with advanced HEP to improve long-term management of condition and independence.  -     LTG 1 Progress  New  [facility]     LTG 2  Pt. Will score </= 25% on Modified Oswestry  (from 44% on initial evaluation) to indicate improved perception of disability.  -     LTG 2 Progress  New  [facility]     LTG 3  Pt. will demonstrate proper lifting techniques to preserve spine with return to hobbies such as gardening.  -     LTG 3 Progress  New  [facility]     LTG 4  Pt. will improve L LE strength to >/= 4+/5 without pain to reduce undue strain on R side.  -     LT 4 Progress  [facility]        Time Calculation    PT Goal Re-Cert Due Date  06/07/21  -       User Key  (r) = Recorded By, (t) = Taken By, (c) = Cosigned By    Initials Name Provider Type     Roxanna Bryan, PT Physical Therapist          PT Assessment/Plan     Row Name 03/09/21 1257          PT Assessment    Functional Limitations  Impaired gait;Impaired locomotion;Limitations in community activities;Performance in work activities;Performance in sport activities;Performance in leisure activities  -     Impairments  Gait;Impaired flexibility;Impaired muscle endurance;Impaired muscle power;Joint integrity;Joint  mobility;Locomotion;Muscle strength;Pain;Poor body mechanics;Posture;Range of motion;Sensation  -     Assessment Comments  Angely Mathias is a 60 y.o. year-old female referred to physical therapy for L sided low back pain with radicular symptoms. She presents with a evolving clinical presentation. Pt. States pain began 2/11/20201 after she was bent forward coughing, when she stood upright she experienced pain in L side and down L LE to knee. Pt. reports radicular pain has improved but still feels burning along L trunk and severe pain on L side back. Pt. Is now on her 3rd steroid dose pack and reports it allows pain to be manageable. Pain is worst in AM and when donning pants. 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 Self scored disability measure of Modified Oswestry was a 44% (where 0% is no disability). She demonstrated (+) SLR, (+) slump on L, tender to palpation L glutes/piriformis, poor posture, impaired flexibility, extension preference with centralization of symptoms, and LE weakness. Signs and symptoms are consistent with referring diagnosis. She is appropriate for skilled therapy services at this time to address deficits and improve ease with ADLs and reduce pain for improved QOL.  -     Please refer to paper survey for additional self-reported information  Yes  -MH     Rehab Potential  Good  -     Patient/caregiver participated in establishment of treatment plan and goals  Yes  -     Patient would benefit from skilled therapy intervention  Yes  -MH        PT Plan    PT Frequency  2x/week  -     Predicted Duration of Therapy Intervention (PT)  8 visits  -     Planned CPT's?  PT EVAL MOD COMPLELITY: 44601;PT RE-EVAL: 67084;PT THER PROC EA 15 MIN:  55231;PT THER ACT EA 15 MIN: 62536;PT MANUAL THERAPY EA 15 MIN: 86774;PT NEUROMUSC RE-EDUCATION EA 15 MIN: 59844;PT GAIT TRAINING EA 15 MIN: 06682;PT SELF CARE/HOME MGMT/TRAIN EA 15: 29759;PT HOT OR COLD PACK TREAT [facility];PT ELECTRICAL STIM UNATTEND: ;PT ULTRASOUND EA 15 MIN: 65650;PT TRACTION LUMBAR: 48364;PT SELF CARE/MGMT/TRAIN 15 MIN: 28001  -     PT Plan Comments  Assess response to initial HEP; progress as appropriate. Consider bridge, clamshell, Nustep, manual distraction, S/L gapping  -MH       User Key  (r) = Recorded By, (t) = Taken By, (c) = Cosigned By    Initials Name Provider Type     Roxanna Bryan, PT Physical Therapist            OP Exercises     Row Name 03/09/21 1300             Total Minutes    86779 - PT Therapeutic Exercise Minutes  10  -MH      61095 - PT Therapeutic Activity Minutes  --  -MH         Exercise 1    Exercise Name 1  prone on elbows  -MH      Cueing 1  Verbal  -MH      Time 1  1 min  -MH      Additional Comments  reports centralization  -MH         Exercise 2    Exercise Name 2  supine sciatic nerve floss  -MH      Cueing 2  Verbal  -MH      Reps 2  10  -MH      Additional Comments  w/ knee  -MH         Exercise 3    Exercise Name 3  H/L TrA  -MH      Cueing 3  Verbal  -MH      Reps 3  8  -MH      Time 3  5  -MH         Exercise 4    Exercise Name 4  HS stretch EOB  -MH      Cueing 4  Verbal  -MH      Reps 4  2  -MH      Time 4  20sec  -MH         Exercise 5    Exercise Name 5  discussed lumbar decompression @ 90/90  -MH        User Key  (r) = Recorded By, (t) = Taken By, (c) = Cosigned By    Initials Name Provider Type     Roxanna Bryan, PT Physical Therapist                        Outcome Measure Options: Modifed Owestry  Modified Oswestry  Modified Oswestry Score/Comments: 22/50 44%      Time Calculation:     Start Time: 1300  Stop Time: 1338  Time Calculation (min): 38 min  Total Timed Code Minutes- PT: 10 minute(s)     Therapy Charges for Today     Code  Description Service Date Service Provider Modifiers Qty    01550718215 HC PT THER PROC EA 15 MIN 3/9/2021 Roxanna Bryan, PT GP 1    81046906549 HC PT EVAL MOD COMPLEXITY 2 3/9/2021 Roxanna Bryan, PT GP 1          PT G-Codes  Outcome Measure Options: Modifed Owestry  Modified Oswestry Score/Comments: 22/50 44%         Roxanna Bryan, PT  3/9/2021

## 2021-03-11 ENCOUNTER — HOSPITAL ENCOUNTER (OUTPATIENT)
Dept: PHYSICAL THERAPY | Facility: HOSPITAL | Age: 61
Setting detail: THERAPIES SERIES
Discharge: HOME | End: 2021-03-11

## 2021-03-11 DIAGNOSIS — R29.3 POOR POSTURE: ICD-10-CM

## 2021-03-11 DIAGNOSIS — M79.605 PAIN OF LEFT LOWER EXTREMITY: ICD-10-CM

## 2021-03-11 DIAGNOSIS — M54.42 ACUTE LEFT-SIDED LOW BACK PAIN WITH LEFT-SIDED SCIATICA: Primary | ICD-10-CM

## 2021-03-11 PROCEDURE — 97140 MANUAL THERAPY 1/> REGIONS: CPT

## 2021-03-11 PROCEDURE — 97110 THERAPEUTIC EXERCISES: CPT

## 2021-03-11 NOTE — THERAPY TREATMENT NOTE
Outpatient Physical Therapy Ortho Treatment Note  [facility]     Patient Name: Angely Mathias  : 1960  MRN: [id number]  Today's Date: 3/11/2021      Visit Date: 2021    Visit Dx:    ICD-10-CM ICD-9-CM   1. Acute left-sided low back pain with left-sided sciatica  M54.42 724.2     724.3   2. Pain of left lower extremity  M79.605 729.5   3. Poor posture  R29.3 781.92       Patient Active Problem List   Diagnosis   • Atopic rhinitis   • Hyperlipidemia   • Chronic low back pain   • Neck pain   • Persistent insomnia   • Overweight   • Lung nodule   • Attention deficit hyperactivity disorder (ADHD), predominantly inattentive type   • Chronic bronchitis (CMS/HCC)   • Other specified glaucoma   • Intervertebral disc disorder with radiculopathy of lumbar region        Past Medical History:   Diagnosis Date   • Acute bronchitis    • Acute pain    • ADHD (attention deficit hyperactivity disorder)     years ago. worse w/ job type change   • Allergic     since i was little   • Blood tests for routine general physical examination    • Cholelithiasis     resolved with GB removal   • Diarrhea    • Glaucoma     treated w/ eye drops   • Headache     ocular and allergy induced   • Hyperlipidemia     noted on past lab work   • Low back pain     occasional chronic   • Microscopic hematuria    • Obesity     at times   • Visual impairment 1965    wear contacts/glasses        Past Surgical History:   Procedure Laterality Date   • ABDOMINOPLASTY     • BREAST SURGERY      Enlargement Procedure   • CHOLECYSTECTOMY     • COLONOSCOPY     • COSMETIC SURGERY      breast aug 1989 and    • DIAGNOSTIC LAPAROSCOPY EXPLORATORY LAPAROTOMY     • DILATATION AND CURETTAGE     • EPIDURAL BLOCK     • EYE SURGERY  1996   • OTHER SURGICAL HISTORY      Vaginal Sling Operation for Stress Incontinence   • TUBAL ABDOMINAL LIGATION     • WRIST SURGERY                         PT Assessment/Plan      Row Name 03/11/21 1700          PT Assessment    Assessment Comments  Pt continues to report pain that radiates from L LB.  She required cuing for for and appropriate intensity with ther ex on HEP.  Added hip stretching and lumbar stabilization.  Discussed posture and neutral spine--worked on in sitting, standing, and supine.  -JA        PT Plan    PT Plan Comments  assess response to last visit, cont with manual MET, add S/L gapping  -JA       User Key  (r) = Recorded By, (t) = Taken By, (c) = Cosigned By    Initials Name Provider Type    Marie Mcgill, PT Physical Therapist            OP Exercises     Row Name 03/11/21 1100             Subjective Comments    Subjective Comments  I hve done therapy in the past and know it can help.  -VIC         Subjective Pain    Able to rate subjective pain?  yes  -VIC      Pre-Treatment Pain Level  4  -JA         Total Minutes    89231 - PT Therapeutic Exercise Minutes  35  -JA      47146 - PT Manual Therapy Minutes  8  -JA         Exercise 1    Exercise Name 1  prone on elbows  -JA      Cueing 1  Verbal  -JA      Time 1  1 min  -JA      Additional Comments  copious cuing and correction for form (pt was doing more of a press-up and too far into extension hips lifted off mat)  -JA         Exercise 2    Exercise Name 2  supine sciatic nerve floss  -JA      Cueing 2  Verbal  -JA      Reps 2  10  -JA         Exercise 3    Exercise Name 3  H/L TrA  -JA      Cueing 3  Verbal  -JA      Reps 3  10  -JA      Time 3  5sec  -JA         Exercise 4    Exercise Name 4  HS stretch (after nerve glide)  -JA      Cueing 4  Verbal;Tactile  -JA      Reps 4  3  -JA      Time 4  20sec hold  -JA         Exercise 5    Exercise Name 5  reviewed lumbar decompression  -JA         Exercise 6    Exercise Name 6  Qped TA  -JA      Cueing 6  Verbal;Tactile  -JA      Reps 6  10  -JA      Time 6  3sec  -JA         Exercise 7    Exercise Name 7  Qped Cat  -JA      Cueing 7  Verbal;Tactile  -JA       Reps 7  5  -JA         Exercise 8    Exercise Name 8  attempted HL piri stretch but seated worked better  -JA      Cueing 8  Verbal;Tactile;Demo  -JA      Reps 8  3  -JA      Time 8  20sec  -JA         Exercise 9    Exercise Name 9  BKFO  -JA      Cueing 9  Verbal;Tactile  -JA      Reps 9  10  -JA      Time 9  3sec  -JA      Additional Comments  L side weaker  -JA         Exercise 10    Exercise Name 10  abdirizak pose w/lateral  -JA      Reps 10  2 ea  -JA      Additional Comments  may have inc irritation  -JA         Exercise 11    Exercise Name 11  STS strategy  will need repetition  -JA      Reps 11  5  -JA      Additional Comments  tip from hips not spine, use weight of body forward to slow descent; squeeze glutes and extend from hhips w/standing up  -JA        User Key  (r) = Recorded By, (t) = Taken By, (c) = Cosigned By    Initials Name Provider Type    Marie Mcgill, PT Physical Therapist                      Manual Rx (last 36 hours)      Manual Treatments     Row Name 03/11/21 1100             Total Minutes    91297 - PT Manual Therapy Minutes  8  -JA         Manual Rx 1    Manual Rx 1 Location  HL   -JA      Manual Rx 1 Type  L LAD; hip mobs, muscle energy shotgun technique f  -JA        User Key  (r) = Recorded By, (t) = Taken By, (c) = Cosigned By    Initials Name Provider Type    Marie Mcgill, PT Physical Therapist              Therapy Education  Education Details: Access Code: [email or web address]  copious cuin for posture and bodymehcnanics, body awareness.  Worked on STS-pt not flexing enough from hips to control sitting down without plop into chair.  Given: HEP, Symptoms/condition management, Pain management, Posture/body mechanics, Mobility training  Program: Reinforced, Progressed  How Provided: Verbal, Demonstration, Written  Provided to: Patient  Level of Understanding: Teach back education performed, Verbalized, Demonstrated              Time Calculation:   Start Time: 1105  Stop Time:  1150  Time Calculation (min): 45 min  Therapy Charges for Today     Code Description Service Date Service Provider Modifiers Qty    [id number]  PT THER PROC EA 15 MIN 3/11/2021 Marie Larson, PT GP 2    [id number]  PT MANUAL THERAPY EA 15 MIN 3/11/2021 Marie Larson, PT GP 1                    Marie Larson, PT  3/11/2021

## 2021-03-16 ENCOUNTER — TELEPHONE (OUTPATIENT)
Dept: INTERNAL MEDICINE | Facility: CLINIC | Age: 61
End: 2021-03-16

## 2021-03-16 NOTE — TELEPHONE ENCOUNTER
Peer to peer done to try to approve MRI of the L/S spine.  Her insurance company will not cover the MRI without six weeks of therapy.  I DESIRE for the patient regarding phone call.  MALLORY

## 2021-03-17 ENCOUNTER — HOSPITAL ENCOUNTER (OUTPATIENT)
Dept: MRI IMAGING | Facility: HOSPITAL | Age: 61
End: 2021-03-17

## 2021-03-18 ENCOUNTER — HOSPITAL ENCOUNTER (OUTPATIENT)
Dept: PHYSICAL THERAPY | Facility: HOSPITAL | Age: 61
Setting detail: THERAPIES SERIES
Discharge: HOME | End: 2021-03-18

## 2021-03-18 DIAGNOSIS — R29.3 POOR POSTURE: ICD-10-CM

## 2021-03-18 DIAGNOSIS — M54.42 ACUTE LEFT-SIDED LOW BACK PAIN WITH LEFT-SIDED SCIATICA: Primary | ICD-10-CM

## 2021-03-18 DIAGNOSIS — M79.605 PAIN OF LEFT LOWER EXTREMITY: ICD-10-CM

## 2021-03-18 DIAGNOSIS — Z00.00 HEALTH MAINTENANCE EXAMINATION: Primary | ICD-10-CM

## 2021-03-18 LAB
ALBUMIN SERPL-MCNC: 4.4 G/DL (ref 3.5–5.2)
ALBUMIN/GLOB SERPL: 2.3 G/DL
ALP SERPL-CCNC: 79 U/L (ref 39–117)
ALT SERPL-CCNC: 25 U/L (ref 1–33)
APPEARANCE UR: CLEAR
AST SERPL-CCNC: 24 U/L (ref 1–32)
BACTERIA #/AREA URNS HPF: NORMAL /HPF
BASOPHILS # BLD AUTO: 0.06 10*3/MM3 (ref 0–0.2)
BASOPHILS NFR BLD AUTO: 1.1 % (ref 0–1.5)
BILIRUB SERPL-MCNC: 0.2 MG/DL (ref 0–1.2)
BILIRUB UR QL STRIP: NEGATIVE
BUN SERPL-MCNC: 13 MG/DL (ref 8–23)
BUN/CREAT SERPL: 12.5 (ref 7–25)
CALCIUM SERPL-MCNC: 9.8 MG/DL (ref 8.6–10.5)
CASTS URNS MICRO: NORMAL
CHLORIDE SERPL-SCNC: 105 MMOL/L (ref 98–107)
CHOLEST SERPL-MCNC: 263 MG/DL (ref 0–200)
CO2 SERPL-SCNC: 26.2 MMOL/L (ref 22–29)
COLOR UR: YELLOW
CREAT SERPL-MCNC: 1.04 MG/DL (ref 0.57–1)
EOSINOPHIL # BLD AUTO: 0.11 10*3/MM3 (ref 0–0.4)
EOSINOPHIL NFR BLD AUTO: 2 % (ref 0.3–6.2)
EPI CELLS #/AREA URNS HPF: NORMAL /HPF
ERYTHROCYTE [DISTWIDTH] IN BLOOD BY AUTOMATED COUNT: 12.8 % (ref 12.3–15.4)
GLOBULIN SER CALC-MCNC: 1.9 GM/DL
GLUCOSE SERPL-MCNC: 99 MG/DL (ref 65–99)
GLUCOSE UR QL: NEGATIVE
HCT VFR BLD AUTO: 40.4 % (ref 34–46.6)
HDLC SERPL-MCNC: 61 MG/DL (ref 40–60)
HGB BLD-MCNC: 13.3 G/DL (ref 12–15.9)
HGB UR QL STRIP: ABNORMAL
IMM GRANULOCYTES # BLD AUTO: 0.01 10*3/MM3 (ref 0–0.05)
IMM GRANULOCYTES NFR BLD AUTO: 0.2 % (ref 0–0.5)
KETONES UR QL STRIP: NEGATIVE
LDLC SERPL CALC-MCNC: 188 MG/DL (ref 0–100)
LEUKOCYTE ESTERASE UR QL STRIP: NEGATIVE
LYMPHOCYTES # BLD AUTO: 1.15 10*3/MM3 (ref 0.7–3.1)
LYMPHOCYTES NFR BLD AUTO: 20.6 % (ref 19.6–45.3)
MCH RBC QN AUTO: 30.4 PG (ref 26.6–33)
MCHC RBC AUTO-ENTMCNC: 32.9 G/DL (ref 31.5–35.7)
MCV RBC AUTO: 92.4 FL (ref 79–97)
MONOCYTES # BLD AUTO: 0.51 10*3/MM3 (ref 0.1–0.9)
MONOCYTES NFR BLD AUTO: 9.2 % (ref 5–12)
NEUTROPHILS # BLD AUTO: 3.73 10*3/MM3 (ref 1.7–7)
NEUTROPHILS NFR BLD AUTO: 66.9 % (ref 42.7–76)
NITRITE UR QL STRIP: NEGATIVE
NRBC BLD AUTO-RTO: 0 /100 WBC (ref 0–0.2)
PH UR STRIP: 6 [PH] (ref 5–8)
PLATELET # BLD AUTO: 288 10*3/MM3 (ref 140–450)
POTASSIUM SERPL-SCNC: 4.5 MMOL/L (ref 3.5–5.2)
PROT SERPL-MCNC: 6.3 G/DL (ref 6–8.5)
PROT UR QL STRIP: NEGATIVE
RBC # BLD AUTO: 4.37 10*6/MM3 (ref 3.77–5.28)
RBC #/AREA URNS HPF: NORMAL /HPF
SODIUM SERPL-SCNC: 140 MMOL/L (ref 136–145)
SP GR UR: 1.02 (ref 1–1.03)
TRIGL SERPL-MCNC: 83 MG/DL (ref 0–150)
TSH SERPL DL<=0.005 MIU/L-ACNC: 1.26 UIU/ML (ref 0.27–4.2)
UROBILINOGEN UR STRIP-MCNC: ABNORMAL MG/DL
VLDLC SERPL CALC-MCNC: 14 MG/DL (ref 5–40)
WBC # BLD AUTO: 5.57 10*3/MM3 (ref 3.4–10.8)
WBC #/AREA URNS HPF: NORMAL /HPF

## 2021-03-18 PROCEDURE — 97140 MANUAL THERAPY 1/> REGIONS: CPT

## 2021-03-18 PROCEDURE — 97110 THERAPEUTIC EXERCISES: CPT

## 2021-03-18 NOTE — THERAPY TREATMENT NOTE
Outpatient Physical Therapy Ortho Treatment Note  [facility]     Patient Name: Angely Mathias  : 1960  MRN: [id number]  Today's Date: 3/18/2021      Visit Date: 2021    Visit Dx:    ICD-10-CM ICD-9-CM   1. Acute left-sided low back pain with left-sided sciatica  M54.42 724.2     724.3   2. Pain of left lower extremity  M79.605 729.5   3. Poor posture  R29.3 781.92       Patient Active Problem List   Diagnosis   • Atopic rhinitis   • Hyperlipidemia   • Chronic low back pain   • Neck pain   • Persistent insomnia   • Overweight   • Lung nodule   • Attention deficit hyperactivity disorder (ADHD), predominantly inattentive type   • Chronic bronchitis (CMS/HCC)   • Other specified glaucoma   • Intervertebral disc disorder with radiculopathy of lumbar region        Past Medical History:   Diagnosis Date   • Acute bronchitis    • Acute pain    • ADHD (attention deficit hyperactivity disorder)     years ago. worse w/ job type change   • Allergic     since i was little   • Blood tests for routine general physical examination    • Cholelithiasis     resolved with GB removal   • Diarrhea    • Glaucoma     treated w/ eye drops   • Headache     ocular and allergy induced   • Hyperlipidemia     noted on past lab work   • Low back pain     occasional chronic   • Microscopic hematuria    • Obesity     at times   • Visual impairment 1965    wear contacts/glasses        Past Surgical History:   Procedure Laterality Date   • ABDOMINOPLASTY     • BREAST SURGERY      Enlargement Procedure   • CHOLECYSTECTOMY     • COLONOSCOPY     • COSMETIC SURGERY      breast aug 1989 and    • DIAGNOSTIC LAPAROSCOPY EXPLORATORY LAPAROTOMY     • DILATATION AND CURETTAGE     • EPIDURAL BLOCK     • EYE SURGERY  1996   • OTHER SURGICAL HISTORY      Vaginal Sling Operation for Stress Incontinence   • TUBAL ABDOMINAL LIGATION     • WRIST SURGERY                         PT Assessment/Plan      Row Name 03/18/21 1155          PT Assessment    Assessment Comments  Pt. presents to therapy noting no real reduction in pain, has good and bad days and has difficulty describing symptoms and location. Modified sciatic nerve floss to sitting with back support with improved tolerance following. Performed gentle manual as pt. very tender and taut through hip girdle tissue, notes small improvement in pain following (from 5/10 to 3/10). Pt. demonstrated improved understanding of prone on elbows and TrA activation with reduced intensity and able to isolate muscle activation. Added S/L gapping over foam roll and muscle energy technique to improve pelvic alignment with good tolerance. Educated pt. to be aware over the next few days for aggravating and alleviating factors and assess tolerance to session this date, pt. voiced understanding.  -        PT Plan    PT Plan Comments  Assess response to last session? update HEP if good tolerance  -       User Key  (r) = Recorded By, (t) = Taken By, (c) = Cosigned By    Initials Name Provider Type     Roxanna Bryan, PT Physical Therapist            OP Exercises     Row Name 03/18/21 1100 03/18/21 1000          Precautions    Existing Precautions/Restrictions  --  other (see comments) Performed exercises with low back on ice pack  -        Subjective Comments    Subjective Comments  --  I have good days and bad days  -        Subjective Pain    Able to rate subjective pain?  --  yes  -     Pre-Treatment Pain Level  --  5  -     Post-Treatment Pain Level  --  3  -        Total Minutes    31116 - PT Therapeutic Exercise Minutes  --  34  -     23332 - PT Manual Therapy Minutes  8  -  --        Exercise 1    Exercise Name 1  --  prone on elbows  -     Cueing 1  --  Verbal  -     Time 1  --  2 min  -        Exercise 2    Exercise Name 2  --  seated sciatic nerve floss  -     Cueing 2  --  Verbal  -     Reps 2  --  10  -     Time 2  --  ice on back   -     Additional Comments  --  w/ knee only and DF  -        Exercise 3    Exercise Name 3  --  H/L TrA  -MH     Cueing 3  --  Verbal  -MH     Reps 3  --  10  -MH     Time 3  --  5sec  -     Additional Comments  --  less cues needed  -        Exercise 4    Exercise Name 4  --  HS stretch (after nerve glide)  -MH     Cueing 4  --  Verbal;Tactile  -     Reps 4  --  3  -MH     Time 4  --  20sec hold  -        Exercise 5    Exercise Name 5  --  S/L lumbar gapping  -MH     Cueing 5  --  Verbal;Demo  -     Time 5  --  2min  -     Additional Comments  --  over foam noodle and pillow  -        Exercise 6    Exercise Name 6  --  Qped TA  -MH     Cueing 6  --  Verbal;Tactile  -     Reps 6  --  10  -MH     Time 6  --  3sec  -        Exercise 7    Exercise Name 7  --  Qped Cat  -MH     Cueing 7  --  Verbal;Tactile  -     Reps 7  --  10  -        Exercise 8    Exercise Name 8  --  self MET  -MH     Cueing 8  --  Verbal  -MH     Reps 8  --  5  -MH     Time 8  --  5sec  -     Additional Comments  --  L ext; R flex  -        Exercise 9    Exercise Name 9  --  BKFO  -MH     Cueing 9  --  Verbal;Tactile  -     Reps 9  --  10  -MH     Time 9  --  3sec  -     Additional Comments  --  small, slow  ROM, control through hip and core  -        Exercise 10    Exercise Name 10  --  --  -MH     Reps 10  --  --  -MH        Exercise 11    Exercise Name 11  --  --  -MH     Reps 11  --  --  -       User Key  (r) = Recorded By, (t) = Taken By, (c) = Cosigned By    Initials Name Provider Type    MH Roxanna Bryan, PT Physical Therapist                      Manual Rx (last 36 hours)      Manual Treatments     Row Name 03/18/21 1100             Total Minutes    77935 - PT Manual Therapy Minutes  8  -MH         Manual Rx 1    Manual Rx 1 Location  S/L on R, STM glutes/piriformis  -      Manual Rx 1 Duration  very tender to palpation  -        User Key  (r) = Recorded By, (t) = Taken By, (c) = Cosigned By     Initials Name Provider Type    Roxanna Whitman, PT Physical Therapist          PT OP Goals     Row Name 03/18/21 1100          PT Short Term Goals    STG Date to Achieve  03/23/21  -     STG 1  Pt. Will be independent with initial HEP to improve self-management of condition.  -     STG 1 Progress  Ongoing  -     STG 2  Pt. Will demonstrate proper log roll technique without cueing to reduce lumbar strain and preserve spine.  -     STG 2 Progress  Ongoing  -     STG 3  Pt. Will report 25% improvement in pain and centralization of symptoms to improve QOL.  -     STG 3 Progress  Ongoing  [facility]        Long Term Goals    LTG Date to Achieve  04/09/21  -     LTG 1  Pt. Will be independent with advanced HEP to improve long-term management of condition and independence.  -     LTG 1 Progress  Ongoing  [facility]     LTG 2  Pt. Will score </= 25% on Modified Oswestry  (from 44% on initial evaluation) to indicate improved perception of disability.  -     LTG 2 Progress  Ongoing  -     LTG 3  Pt. will demonstrate proper lifting techniques to preserve spine with return to hobbies such as gardening.  -     LTG 3 Progress  Ongoing  -     LTG 4  Pt. will improve L LE strength to >/= 4+/5 without pain to reduce undue strain on R side.  -     LTG 4 Progress  Ongoing  [facility]       User Key  (r) = Recorded By, (t) = Taken By, (c) = Cosigned By    Initials Name Provider Type    Roxanna Whitman, [facility] Physical Therapist          Therapy Education  Education Details: Use step stool to get into bed in order to perform log roll technique, modified sciatic nerve floss to seated with back against chair; be aware of aggavating/alleviating factors this weekend. Avoid uneven weight shift in standing to reduce lumbar strain and improve pelvic alignment  Given: HEP, Posture/body mechanics, Pain management  Program: Reinforced  How Provided: Verbal, Demonstration  Provided to: Patient  Level of Understanding: Verbalized,  Demonstrated              Time Calculation:   Start Time: 1048  Stop Time: 1130  Time Calculation (min): 42 min  Total Timed Code Minutes- PT: 42 minute(s)  Therapy Charges for Today     Code Description Service Date Service Provider Modifiers Qty    [id number]  PT MANUAL THERAPY EA 15 MIN 3/18/2021 Roxanna Bryan, PT GP 1    30778464176 HC PT THER PROC EA 15 MIN 3/18/2021 Roxanna Bryan, PT GP 2                    Roxanna Bryan, PT  3/18/2021

## 2021-03-23 ENCOUNTER — APPOINTMENT (OUTPATIENT)
Dept: PHYSICAL THERAPY | Facility: HOSPITAL | Age: 61
End: 2021-03-23

## 2021-03-25 ENCOUNTER — HOSPITAL ENCOUNTER (OUTPATIENT)
Dept: PHYSICAL THERAPY | Facility: HOSPITAL | Age: 61
Setting detail: THERAPIES SERIES
Discharge: HOME | End: 2021-03-25

## 2021-03-25 DIAGNOSIS — M54.42 ACUTE LEFT-SIDED LOW BACK PAIN WITH LEFT-SIDED SCIATICA: Primary | ICD-10-CM

## 2021-03-25 DIAGNOSIS — R29.3 POOR POSTURE: ICD-10-CM

## 2021-03-25 DIAGNOSIS — M79.605 PAIN OF LEFT LOWER EXTREMITY: ICD-10-CM

## 2021-03-25 PROCEDURE — 97110 THERAPEUTIC EXERCISES: CPT

## 2021-03-25 PROCEDURE — 97140 MANUAL THERAPY 1/> REGIONS: CPT

## 2021-03-29 ENCOUNTER — OFFICE VISIT (OUTPATIENT)
Dept: INTERNAL MEDICINE | Facility: CLINIC | Age: 61
End: 2021-03-29

## 2021-03-29 VITALS
SYSTOLIC BLOOD PRESSURE: 124 MMHG | HEIGHT: 69 IN | WEIGHT: 207 LBS | DIASTOLIC BLOOD PRESSURE: 84 MMHG | HEART RATE: 85 BPM | BODY MASS INDEX: 30.66 KG/M2 | OXYGEN SATURATION: 97 %

## 2021-03-29 DIAGNOSIS — M54.42 ACUTE LEFT-SIDED LOW BACK PAIN WITH LEFT-SIDED SCIATICA: ICD-10-CM

## 2021-03-29 DIAGNOSIS — F90.0 ATTENTION DEFICIT HYPERACTIVITY DISORDER (ADHD), PREDOMINANTLY INATTENTIVE TYPE: ICD-10-CM

## 2021-03-29 DIAGNOSIS — Z00.00 WELL ADULT EXAM: Primary | ICD-10-CM

## 2021-03-29 DIAGNOSIS — M25.511 RIGHT SHOULDER PAIN, UNSPECIFIED CHRONICITY: ICD-10-CM

## 2021-03-29 PROCEDURE — 99396 PREV VISIT EST AGE 40-64: CPT | Performed by: INTERNAL MEDICINE

## 2021-03-29 RX ORDER — ATORVASTATIN CALCIUM 20 MG/1
20 TABLET, FILM COATED ORAL DAILY
Qty: 90 TABLET | Refills: 3 | Status: SHIPPED | OUTPATIENT
Start: 2021-03-29 | End: 2021-09-15 | Stop reason: SDUPTHER

## 2021-03-29 RX ORDER — DEXTROAMPHETAMINE SACCHARATE, AMPHETAMINE ASPARTATE MONOHYDRATE, DEXTROAMPHETAMINE SULFATE AND AMPHETAMINE SULFATE 2.5; 2.5; 2.5; 2.5 MG/1; MG/1; MG/1; MG/1
10 CAPSULE, EXTENDED RELEASE ORAL EVERY MORNING
Qty: 30 CAPSULE | Refills: 0 | Status: SHIPPED | OUTPATIENT
Start: 2021-03-29 | End: 2021-04-07 | Stop reason: SDUPTHER

## 2021-03-29 NOTE — PROGRESS NOTES
Subjective     Angely Mathias is a 60 y.o. female who presents for a complete physical exam.      History of Present Illness     She continues to have LBP.  She has continued to have issues since February.  She is in PT with some improvement.  She is still taking up to three hydrocodone a day.  Overall she is 30% better.  We discussed doing epidurals.      ADD.  Control is not adequate on 30 mg.  She would like to increase to 40 mg.      HLD.  I reviewed cholesterol labs with the patient.  I think the patient needs to consider a statin medication.  I would do blood work every 3 months initially to monitor liver enzymes.  Side effects could include muscle aches which the patient should let me know if they have.         Review of Systems   Constitutional: Negative.    HENT: Negative.    Eyes: Negative.    Respiratory: Negative.    Cardiovascular: Negative.    Gastrointestinal: Negative.    Endocrine: Negative.    Genitourinary: Negative.    Musculoskeletal: Positive for back pain.   Skin: Negative.    Allergic/Immunologic: Negative.    Neurological: Negative.    Hematological: Negative.    Psychiatric/Behavioral: Negative.        The following portions of the patient's history were reviewed and updated as appropriate: allergies, current medications, past family history, past medical history, past social history, past surgical history and problem list.  Health maintenance tab was reviewed and updated with the patient.       Patient Active Problem List    Diagnosis Date Noted   • Intervertebral disc disorder with radiculopathy of lumbar region 02/19/2020   • Other specified glaucoma 06/11/2019   • Chronic bronchitis (CMS/HCC) 11/28/2018   • Attention deficit hyperactivity disorder (ADHD), predominantly inattentive type 10/28/2016     Note Last Updated: 10/28/2016     Neuropsych testing in 10/2016 with [facility].       • Lung nodule 10/18/2016     Note Last Updated: 11/28/2018     Stable 10/16-10/18     •  Atopic rhinitis 2016   • Hyperlipidemia 2016     Note Last Updated: 2018     Description: 2014.  10 year risk ASCVD was 1.7 %; 2.1% 10 year risk.       • Chronic low back pain 2016     Note Last Updated: 2016     Description: chronic LBP.  Uses hydrocodone three times a week     • Neck pain 2016   • Persistent insomnia 2016   • Overweight 2016       Past Medical History:   Diagnosis Date   • Acute bronchitis    • Acute pain    • ADHD (attention deficit hyperactivity disorder)     years ago. worse w/ job type change   • Allergic     since i was little   • Blood tests for routine general physical examination    • Cholelithiasis     resolved with GB removal   • Diarrhea    • Glaucoma     treated w/ eye drops   • Headache     ocular and allergy induced   • Hyperlipidemia     noted on past lab work   • Low back pain     occasional chronic   • Microscopic hematuria    • Obesity     at times   • Visual impairment 1965    wear contacts/glasses       Past Surgical History:   Procedure Laterality Date   • ABDOMINOPLASTY     • BREAST SURGERY      Enlargement Procedure   • CHOLECYSTECTOMY     • COLONOSCOPY     • COSMETIC SURGERY      breast aug 1989 and    • DIAGNOSTIC LAPAROSCOPY EXPLORATORY LAPAROTOMY     • DILATATION AND CURETTAGE     • EPIDURAL BLOCK     • EYE SURGERY      RK    • OTHER SURGICAL HISTORY      Vaginal Sling Operation for Stress Incontinence   • TUBAL ABDOMINAL LIGATION     • WRIST SURGERY         Family History   Problem Relation Age of Onset   • Breast cancer Sister    • Bipolar disorder Brother    • Diabetes Brother         type 2 DM   • Diabetes Brother    • Alcohol abuse Father         D.    • Cancer Sister         Breast ca    • Diabetes Maternal Grandfather         DX in his 80's   • Early death Brother          in sleep at 48-   • Hearing loss Mother         L ear   • Miscarriages /  Stillbirths Mother         child number 4   • Vision loss Mother         cataracts replaced       Social History     Socioeconomic History   • Marital status:      Spouse name: Not on file   • Number of children: Not on file   • Years of education: Not on file   • Highest education level: Not on file   Tobacco Use   • Smoking status: Never Smoker   • Smokeless tobacco: Never Used   • Tobacco comment: none   Substance and Sexual Activity   • Alcohol use: Yes     Types: 2 Glasses of wine, 2 Standard drinks or equivalent per week   • Drug use: No   • Sexual activity: Yes     Partners: Male     Birth control/protection: Post-menopausal, Surgical     Comment: tubal ligation at 29yo., natural menopause at 38       Current Outpatient Medications on File Prior to Visit   Medication Sig Dispense Refill   • amphetamine-dextroamphetamine XR (Adderall XR) 30 MG 24 hr capsule Take 1 capsule by mouth Every Morning 30 capsule 0   • bimatoprost (LUMIGAN) 0.03 % ophthalmic drops Instill 1 drop into each eye every night at bedtime 2.5 mL 3   • budesonide-formoterol (SYMBICORT) 80-4.5 MCG/ACT inhaler Inhale 2 puffs by mouth twice a day 10.2 g 10   • Calcium Carbonate-Vitamin D (CALCIUM + D PO) Take by mouth.     • DYMISTA 137-50 MCG/ACT suspension      • estradiol (VAGIFEM) 10 MCG tablet vaginal tablet Insert 1 tablet into the vagina 2 (Two) Times a Week. 24 tablet 3   • HYDROcodone-acetaminophen (NORCO)  MG per tablet Take 1 tablet by mouth Every 6 (Six) Hours As Needed for Moderate Pain . 50 tablet 0   • norethindrone-ethinyl estradiol (Jinteli) 1-5 MG-MCG tablet Take 1 tablet by mouth Daily. 90 tablet 3   • tiZANidine (ZANAFLEX) 4 MG tablet Take 1 tablet by mouth Every 6 (Six) Hours As Needed for Muscle Spasms. 30 tablet 5   • [DISCONTINUED] azelastine (ASTELIN) 0.1 % nasal spray      • [DISCONTINUED] Azelastine-Fluticasone 137-50 MCG/ACT suspension Spray 1 spray in each nostril twice daily 23 g 11   • [DISCONTINUED]  "COMBIVENT RESPIMAT  MCG/ACT inhaler      • [DISCONTINUED] DULERA 200-5 MCG/ACT inhaler      • [DISCONTINUED] estradiol (VAGIFEM) 10 MCG tablet vaginal tablet      • [DISCONTINUED] naproxen (NAPROSYN) 500 MG tablet TAKE ONE TABLET BY MOUTH TWICE A DAY WITH MEALS 60 tablet 0   • [DISCONTINUED] norethindrone-ethinyl estradiol (FEMHRT 1/5) 1-5 MG-MCG tablet Take by mouth.     • [DISCONTINUED] predniSONE (DELTASONE) 50 MG tablet Take 1 tablet by mouth Daily. 5 tablet 0     No current facility-administered medications on file prior to visit.       Allergies   Allergen Reactions   • Contrast Dye Shortness Of Breath   • Pistachio Nut (Diagnostic) Anaphylaxis   • Codeine Itching   • Morphine And Related Itching and Rash   • Sulfa Antibiotics Itching and Rash       Immunization History   Administered Date(s) Administered   • COVID-19 (PFIZER) 01/05/2021, 01/28/2021   • Flu Vaccine Intradermal Quad 18-64YR 10/11/2018   • Flu Vaccine Quad PF >36MO 10/21/2020   • Flucelvax Quad Vial =>4yrs 11/07/2019   • Hepatitis A 04/30/2018, 11/15/2018   • Influenza, Unspecified 10/01/2020   • Tdap 01/24/2014       Objective     /84   Pulse 85   Ht 175.3 cm (69.02\")   Wt 93.9 kg (207 lb)   SpO2 97%   BMI 30.55 kg/m²     Physical Exam  Constitutional:       Appearance: She is well-developed.   HENT:      Head: Normocephalic and atraumatic.      Right Ear: Hearing, tympanic membrane and external ear normal.      Left Ear: Hearing, tympanic membrane and external ear normal.      Nose: Nose normal.   Neck:      Thyroid: No thyromegaly.   Cardiovascular:      Rate and Rhythm: Normal rate and regular rhythm.      Heart sounds: Normal heart sounds. No murmur heard.     Pulmonary:      Effort: Pulmonary effort is normal.      Breath sounds: Normal breath sounds.   Chest:      Breasts:         Right: No mass.         Left: No mass.   Abdominal:      General: There is no distension.      Palpations: Abdomen is soft.      Tenderness: " There is no abdominal tenderness.   Musculoskeletal:      Cervical back: Neck supple.   Lymphadenopathy:      Cervical: No cervical adenopathy.   Skin:     General: Skin is warm and dry.   Neurological:      Mental Status: She is alert and oriented to person, place, and time.   Psychiatric:         Speech: Speech normal.         Behavior: Behavior normal.         Thought Content: Thought content normal.         Judgment: Judgment normal.         Assessment/Plan   Diagnoses and all orders for this visit:    1. Well adult exam (Primary)    2. Attention deficit hyperactivity disorder (ADHD), predominantly inattentive type  -     amphetamine-dextroamphetamine XR (Adderall XR) 10 MG 24 hr capsule; Take 1 capsule by mouth Every Morning  Dispense: 30 capsule; Refill: 0    3. Right shoulder pain, unspecified chronicity  -     Ambulatory Referral to Physical Therapy Evaluate and treat    4. Acute left-sided low back pain with left-sided sciatica  -     Epidural Block    Other orders  -     atorvastatin (LIPITOR) 20 MG tablet; Take 1 tablet by mouth Daily.  Dispense: 90 tablet; Refill: 3        Discussion    Patient presents today for a CPE.      Patient follows a healthy diet.   Patient follows an adequate exercise regimen. Mammogram is up to date.   Colon cancer screening is up to date.   Pap smears are performed by the patient's gynecologist.   I have recommended that the patient get the following immunizations:  Shingrix.  ADD.  Increase to 40mg with next refill.    LBP.  She has had six weeks.   HLD.  Add Lipitor.  Discussed with the patient onset on action and the potential side effects including myalgias.  The patient will let me know of any side effects from the medication.            Health Maintenance   Topic Date Due   • ZOSTER VACCINE (1 of 2) Never done   • ANNUAL PHYSICAL  12/07/2020   • PAP SMEAR  09/01/2021   • LIPID PANEL  03/18/2022   • COLONOSCOPY  08/03/2022   • MAMMOGRAM  11/20/2022   • TDAP/TD VACCINES  (2 - Td) 01/24/2024   • HEPATITIS C SCREENING  Completed   • COVID-19 Vaccine  Completed   • INFLUENZA VACCINE  Completed   • Pneumococcal Vaccine 0-64  Aged Out   • MENINGOCOCCAL VACCINE  Aged Out            Future Appointments   Date Time Provider Department Center   3/30/2021  5:45 PM Roxanna Bryan, PT [facility]   4/1/2021  5:45 PM Roxanna Bryan, PT [facility]   4/6/2021  5:45 PM Roxanna Bryan, PT [facility]   4/8/2021  5:45 PM Roxanna Bryan, PT [facility]   6/30/2021  8:50 AM [facility]   3/28/2022  9:00 AM [facility]   4/1/2022 10:00 AM Cynthia Reid MD [facility]

## 2021-03-30 ENCOUNTER — HOSPITAL ENCOUNTER (OUTPATIENT)
Dept: PHYSICAL THERAPY | Facility: HOSPITAL | Age: 61
Setting detail: THERAPIES SERIES
Discharge: HOME | End: 2021-03-30

## 2021-03-30 DIAGNOSIS — M79.605 PAIN OF LEFT LOWER EXTREMITY: ICD-10-CM

## 2021-03-30 DIAGNOSIS — R29.3 POOR POSTURE: ICD-10-CM

## 2021-03-30 DIAGNOSIS — M54.42 ACUTE LEFT-SIDED LOW BACK PAIN WITH LEFT-SIDED SCIATICA: Primary | ICD-10-CM

## 2021-03-30 PROCEDURE — 97110 THERAPEUTIC EXERCISES: CPT

## 2021-03-30 PROCEDURE — 97140 MANUAL THERAPY 1/> REGIONS: CPT

## 2021-03-30 NOTE — THERAPY TREATMENT NOTE
Outpatient Physical Therapy Ortho Treatment Note  [facility]     Patient Name: Angely Mathias  : 1960  MRN: [id number]  Today's Date: 3/30/2021      Visit Date: 2021    Visit Dx:    ICD-10-CM ICD-9-CM   1. Acute left-sided low back pain with left-sided sciatica  M54.42 724.2     724.3   2. Pain of left lower extremity  M79.605 729.5   3. Poor posture  R29.3 781.92       Patient Active Problem List   Diagnosis   • Atopic rhinitis   • Hyperlipidemia   • Chronic low back pain   • Neck pain   • Persistent insomnia   • Overweight   • Lung nodule   • Attention deficit hyperactivity disorder (ADHD), predominantly inattentive type   • Chronic bronchitis (CMS/HCC)   • Other specified glaucoma   • Intervertebral disc disorder with radiculopathy of lumbar region        Past Medical History:   Diagnosis Date   • Acute bronchitis    • Acute pain    • ADHD (attention deficit hyperactivity disorder)     years ago. worse w/ job type change   • Allergic     since i was little   • Blood tests for routine general physical examination    • Cholelithiasis     resolved with GB removal   • Diarrhea    • Glaucoma     treated w/ eye drops   • Headache     ocular and allergy induced   • Hyperlipidemia     noted on past lab work   • Low back pain     occasional chronic   • Microscopic hematuria    • Obesity     at times   • Visual impairment 1965    wear contacts/glasses        Past Surgical History:   Procedure Laterality Date   • ABDOMINOPLASTY     • BREAST SURGERY      Enlargement Procedure   • CHOLECYSTECTOMY     • COLONOSCOPY     • COSMETIC SURGERY      breast aug 1989 and    • DIAGNOSTIC LAPAROSCOPY EXPLORATORY LAPAROTOMY     • DILATATION AND CURETTAGE     • EPIDURAL BLOCK     • EYE SURGERY  1996   • OTHER SURGICAL HISTORY      Vaginal Sling Operation for Stress Incontinence   • TUBAL ABDOMINAL LIGATION     • WRIST SURGERY                         PT Assessment/Plan      Row Name 03/30/21 1600          PT Assessment    Assessment Comments  Ms. Mathias returns noting centralization of symptoms to mid low back/L hip. Reports having referral placed for R shoulder, discussed options regarding evaluation for R shoulder. Pt. would like to begin PT for shoulder as it is quite troublesome and plan to alternate treatment sessions following shoulder evaluation. Additionally, pt. has referral placed for epidural to lumbar spine which she has had positive results from before.  Pt. tolerated prone HS curls, prone hip ext, and bridges + add to address hip girdle strength and low back support.  -MH        PT Plan    PT Plan Comments  evaluate shoulder?  -       User Key  (r) = Recorded By, (t) = Taken By, (c) = Cosigned By    Initials Name Provider Type    Roxanna Whitman, PT Physical Therapist            OP Exercises     Row Name 03/30/21 1600 03/30/21 1500          Precautions    Existing Precautions/Restrictions  other (see comments) Performed exercises with low back on ice pack  -MH  --        Subjective Comments    Subjective Comments  It doesnt go down my leg anymore  -MH  --        Total Minutes    82274 - PT Therapeutic Exercise Minutes  28  -MH  --     91763 - PT Manual Therapy Minutes  --  12  -MH        Exercise 1    Exercise Name 1  prone on elbows  -MH  --     Cueing 1  Verbal  -MH  --     Time 1  2 min  -MH  --        Exercise 2    Exercise Name 2  pirformis stretch + fig 4   -MH  --     Cueing 2  Verbal  -MH  --     Reps 2  3  -MH  --     Time 2  20sec  -MH  --        Exercise 3    Exercise Name 3  H/L TrA  -MH  --     Cueing 3  Verbal  -MH  --     Reps 3  10  -MH  --     Time 3  5sec  -MH  --        Exercise 4    Exercise Name 4  bridge + add  -MH  --     Cueing 4  Verbal  -MH  --     Reps 4  10  -MH  --     Time 4  2-3sec  -MH  --        Exercise 5    Exercise Name 5  prone hip extension  -MH  --     Cueing 5  Verbal  -MH  --     Reps 5  10e  -MH  --     Time 5  --  -MH  --         Exercise 6    Exercise Name 6  Qped TA  -MH  --     Cueing 6  Verbal;Tactile  -  --     Reps 6  10  -MH  --     Time 6  3sec  -  --        Exercise 7    Exercise Name 7  Qped Cat  -MH  --     Cueing 7  Verbal;Tactile  -MH  --     Reps 7  10  -MH  --        Exercise 8    Exercise Name 8  prone HS curls  -  --     Cueing 8  Verbal  -  --     Reps 8  10e  -MH  --     Time 8  --  -MH  --        Exercise 9    Exercise Name 9  NuStep  -  --     Cueing 9  Verbal;Tactile  -  --     Reps 9  --  -MH  --     Time 9  5 min  -  --     Additional Comments  LE only   -  --       User Key  (r) = Recorded By, (t) = Taken By, (c) = Cosigned By    Initials Name Provider Type     Roxanna Bryan, PT Physical Therapist                      Manual Rx (last 36 hours)      Manual Treatments     Row Name 03/30/21 1500             Total Minutes    88670 - PT Manual Therapy Minutes  12  -         Manual Rx 1    Manual Rx 1 Location  S/L on R, STM glutes/piriformis  -      Manual Rx 1 Duration  very tender to palpation  -        User Key  (r) = Recorded By, (t) = Taken By, (c) = Cosigned By    Initials Name Provider Type     Roxanna Bryan, PT Physical Therapist          PT OP Goals     Row Name 03/30/21 1600          PT Short Term Goals    STG Date to Achieve  03/23/21  -     STG 1  Pt. Will be independent with initial HEP to improve self-management of condition.  -     STG 1 Progress  Ongoing  -     STG 2  Pt. Will demonstrate proper log roll technique without cueing to reduce lumbar strain and preserve spine.  -     STG 2 Progress  Ongoing  -     STG 3  Pt. Will report 25% improvement in pain and centralization of symptoms to improve QOL.  -     STG 3 Progress  Ongoing  -        Long Term Goals    LTG Date to Achieve  04/09/21  -     LTG 1  Pt. Will be independent with advanced HEP to improve long-term management of condition and independence.  -     LTG 1 Progress  Ongoing  -     LTG 2   Pt. Will score </= 25% on Modified Oswestry  (from 44% on initial evaluation) to indicate improved perception of disability.  -     LTG 2 Progress  Ongoing  -     LTG 3  Pt. will demonstrate proper lifting techniques to preserve spine with return to hobbies such as gardening.  -     LTG 3 Progress  Ongoing  -     LTG 4  Pt. will improve L LE strength to >/= 4+/5 without pain to reduce undue strain on R side.  -     LTG 4 Progress  Ongoing  -       User Key  (r) = Recorded By, (t) = Taken By, (c) = Cosigned By    Initials Name Provider Type     Roxanna Bryan, PT Physical Therapist                         Time Calculation:   Start Time: 1620  Stop Time: 1700  Time Calculation (min): 40 min  Total Timed Code Minutes- PT: 40 minute(s)  Therapy Charges for Today     Code Description Service Date Service Provider Modifiers Qty    74842412845  PT MANUAL THERAPY EA 15 MIN 3/30/2021 Roxanna Bryan, PT GP 1    [id number]  PT THER PROC EA 15 MIN 3/30/2021 Roxanna Bryan, PT GP 2                    Roxanna Bryan PT  3/30/2021

## 2021-04-01 ENCOUNTER — HOSPITAL ENCOUNTER (OUTPATIENT)
Dept: PHYSICAL THERAPY | Facility: HOSPITAL | Age: 61
Setting detail: THERAPIES SERIES
Discharge: HOME | End: 2021-04-01

## 2021-04-01 DIAGNOSIS — M25.511 CHRONIC RIGHT SHOULDER PAIN: Primary | ICD-10-CM

## 2021-04-01 DIAGNOSIS — G89.29 CHRONIC RIGHT SHOULDER PAIN: Primary | ICD-10-CM

## 2021-04-01 DIAGNOSIS — R29.3 POOR POSTURE: ICD-10-CM

## 2021-04-01 PROCEDURE — 97110 THERAPEUTIC EXERCISES: CPT

## 2021-04-01 PROCEDURE — 97161 PT EVAL LOW COMPLEX 20 MIN: CPT

## 2021-04-01 NOTE — THERAPY EVALUATION
Outpatient Physical Therapy Ortho Initial Evaluation  [facility]     Patient Name: Angely Mathias  : 1960  MRN: [id number]  Today's Date: 2021      Visit Date: 2021    Patient Active Problem List   Diagnosis   • Atopic rhinitis   • Hyperlipidemia   • Chronic low back pain   • Neck pain   • Persistent insomnia   • Overweight   • Lung nodule   • Attention deficit hyperactivity disorder (ADHD), predominantly inattentive type   • Chronic bronchitis (CMS/HCC)   • Other specified glaucoma   • Intervertebral disc disorder with radiculopathy of lumbar region        Past Medical History:   Diagnosis Date   • Acute bronchitis    • Acute pain    • ADHD (attention deficit hyperactivity disorder)     years ago. worse w/ job type change   • Allergic     since i was little   • Blood tests for routine general physical examination    • Cholelithiasis     resolved with GB removal   • Diarrhea    • Glaucoma     treated w/ eye drops   • Headache     ocular and allergy induced   • Hyperlipidemia     noted on past lab work   • Low back pain     occasional chronic   • Microscopic hematuria    • Obesity     at times   • Visual impairment 1965    wear contacts/glasses        Past Surgical History:   Procedure Laterality Date   • ABDOMINOPLASTY     • BREAST SURGERY      Enlargement Procedure   • CHOLECYSTECTOMY     • COLONOSCOPY     • COSMETIC SURGERY      breast aug 1989 and    • DIAGNOSTIC LAPAROSCOPY EXPLORATORY LAPAROTOMY     • DILATATION AND CURETTAGE     • EPIDURAL BLOCK     • EYE SURGERY  1996   • OTHER SURGICAL HISTORY      Vaginal Sling Operation for Stress Incontinence   • TUBAL ABDOMINAL LIGATION     • WRIST SURGERY         Visit Dx:     ICD-10-CM ICD-9-CM   1. Chronic right shoulder pain  M25.511 719.41    G89.29 338.29   2. Poor posture  R29.3 781.92         Patient History     Row Name 21 1700 21 0936          History    Chief Complaint  Pain   -  Muscle tenderness;Pain  (Pended)   (Patient-Rptd)   -patient     Type of Pain  Shoulder pain  -  Shoulder pain  (Pended)   (Patient-Rptd)   -patient     Date Current Problem(s) Began  08/11/20  -  08/11/20  (Pended)   (Patient-Rptd)   -patient     Brief Description of Current Complaint  Pt. presents to therapy with reports of ongoing shoulder pain sicce 8/11/2020. Pt.s tates she bent forward to  her grandson in a scooping motion and since then has not improved much. Pt. feels the paiin around shouldere blade and moves around the shoulder to the back. Pt. denies hearing/feeling pop and does not click or catch with movements.   -  pain n right shoulder, clavicle area  (Pended)   (Patient-Rptd)   -patient     Patient/Caregiver Goals  Know what to do to help the symptoms;Relieve pain;Return to prior level of function  -  Know what to do to help the symptoms  (Pended)   (Patient-Rptd)   -patient     Hand Dominance  right-handed  -  right-handed  (Pended)   (Patient-Rptd)   -patient     Occupation/sports/leisure activities  gardening, painting  -  gardening, painting  (Pended)   (Patient-Rptd)   -patient     Patient seeing anyone else for problem(s)?  --  no  (Pended)   (Patient-Rptd)   -patient     What clinical tests have you had for this problem?  X-ray  -  X-ray  (Pended)   (Patient-Rptd)   -patient     Other Clinical Tests  degenerative changes  -  --     Are you or can you be pregnant  No  -  No  (Pended)   (Patient-Rptd)   -patient        Pain     Pain Location  Shoulder  -  --     Pain at Present  3  -  --     Pain at Best  1  -  --     Pain at Worst  6  -MH  --     Pain Frequency  Intermittent;Constant/continuous  -  --     Pain Description  Aching bruised  -  --     What Performance Factors Make the Current Problem(s) WORSE?  picking gup grocery bag, picking up grandsons, donning pants  -  --     What Performance Factors Make the Current Problem(s) BETTER?  resting,  being still  -MH  --     Is your sleep disturbed?  Yes  -MH  --     Total hours of sleep per night  5-6 hours  -MH  --     Difficulties at work?  yes  -MH  --     Difficulties with ADL's?  yes  -MH  --        Fall Risk Assessment    Any falls in the past year:  No  -MH  No  (Pended)   (Patient-Rptd)   -patient        Services    Prior Rehab/Home Health Experiences  Yes  -MH  No  (Pended)   (Patient-Rptd)   -patient     When was the prior experience with Rehab/Home Health  currently for back  -MH  --     Are you currently receiving Home Health services  --  No  (Pended)   (Patient-Rptd)   -patient     Do you plan to receive Home Health services in the near future  --  No  (Pended)   (Patient-Rptd)   -patient        Daily Activities    Primary Language  English  -MH  English  (Pended)   (Patient-Rptd)   -patient     Are you able to read  --  Yes  (Pended)   (Patient-Rptd)   -patient     Are you able to write  --  Yes  (Pended)   (Patient-Rptd)   -patient     How does patient learn best?  Demonstration;Pictures/Video  -  Demonstration;Pictures/Video  (Pended)   (Patient-Rptd)   -patient     Does patient have problems with the following?  None  -MH  --     Barriers to learning  None  -MH  --     Pt Participated in POC and Goals  Yes  -MH  --        Safety    Are you being hurt, hit, or frightened by anyone at home or in your life?  No  -MH  No  (Pended)   (Patient-Rptd)   -patient     Are you being neglected by a caregiver  No  -MH  No  (Pended)   (Patient-Rptd)   -patient     Have you had any of the following issues with  N/A  -MH  N/A  (Pended)   (Patient-Rptd)   -patient       User Key  (r) = Recorded By, (t) = Taken By, (c) = Cosigned By    Initials Name Provider Type     Roxanna Bryan, PT Physical Therapist    patient Angely Mendiolar --          PT Ortho     Row Name 04/01/21 1700       Posture/Observations    Alignment Options  Rounded shoulders;Forward head  -MH    Forward Head  Mild  -MH    Rounded Shoulders   Mild  -       Quarter Clearing    Quarter Clearing  Upper Quarter Clearing  -       Cervical/Shoulder ROM Screen    Cervical flexion  Normal  -    Cervical extension  Normal  -    Cervical lateral flexion  Normal  -    Cervical rotation  Normal  -       Special Tests/Palpation    Special Tests/Palpation  Shoulder  -       Shoulder Impingement/Rotator Cuff Special Tests    Shah-Denzel Test (RC Lesion vs. Bursitis)  Right:;Positive  -    Neer Impingement Test (RC Lesion vs. Bursitis)  Right:;Positive  -    Full Can Test (RC Lesion)  Right:;Negative  -    Lift-Off Test (Subscapularis Lesion)  Right:;Positive  -       Biceps/Labral Special Tests    Speed's Test (Biceps Tendinopathy vs. Labral Tear)  Right:;Positive  -       Shoulder Girdle Palpation    Shoulder Girdle Palpation?  Yes  -    Supraspinatus Insertion  Tender  -    Long Head of Biceps  Tender  -    Infraspinatus  Tender  -    Teres Minor  -- no tenderness  -    Upper Trap  Guarded/taut  -    Levator Scapula  Guarded/taut  -       General ROM    RT Upper Ext  Rt Shoulder Flexion;Rt Shoulder Internal Rotation;Rt Shoulder External Rotation;Rt Shoulder ABduction  -    LT Upper Ext  Lt Shoulder ABduction;Lt Shoulder Flexion;Lt Shoulder External Rotation;Lt Shoulder Internal Rotation  -       Right Upper Ext    Rt Shoulder Abduction AROM  152  -MH    Rt Shoulder Flexion AROM  151  -MH    Rt Shoulder Flexion PROM  160 supine  -    Rt Shoulder External Rotation AROM  FERCHO T3  -MH    Rt Shoulder External Rotation PROM  80  -MH    Rt Shoulder Internal Rotation AROM  FIR mid lumbar  -    Rt Shoulder Internal Rotation PROM  85  -MH    Rt Upper Extremity Comments   AROM in sitting  -       Left Upper Ext    Lt Shoulder Abduction AROM  159  -MH    Lt Shoulder Flexion AROM  158  -MH    Lt Shoulder External Rotation AROM  FERCHO T4  -MH    Lt Shoulder Internal Rotation AROM  FIR mid thoracic  -    Lt Upper Extremity Comments    AROM in sitting  -       MMT (Manual Muscle Testing)    Rt Upper Ext  Rt Shoulder Internal Rotation;Rt Shoulder External Rotation;Rt Shoulder Flexion;Rt Shoulder ABduction;Rt Elbow Flexion;Rt Elbow Extension  -    Lt Upper Ext  Lt Shoulder Flexion;Lt Shoulder ABduction;Lt Shoulder Internal Rotation;Lt Shoulder External Rotation;Lt Elbow Flexion;Lt Elbow Extension  -MH       MMT Right Upper Ext    Rt Shoulder Flexion MMT, Gross Movement  (4+/5) good plus  -MH    Rt Shoulder ABduction MMT, Gross Movement  (4/5) good  -MH    Rt Shoulder Internal Rotation MMT, Gross Movement  (4-/5) good minus  -MH    Rt Shoulder External Rotation MMT, Gross Movement  (3+/5) fair plus  -MH    Rt Elbow Flexion MMT, Gross Movement:  (5/5) normal  -    Rt Elbow Extension MMT, Gross Movement:  (5/5) normal  -       MMT Left Upper Ext    Lt Shoulder Flexion MMT, Gross Movement  (5/5) normal  -    Lt Shoulder ABduction MMT, Gross Movement  (4+/5) good plus  -MH    Lt Shoulder Internal Rotation MMT, Gross Movement  (4+/5) good plus  -MH    Lt Shoulder External Rotation MMT, Gross Movement  (4+/5) good plus  -MH    Lt Elbow Flexion MMT, Gross Movement  (5/5) normal  -MH    Lt Elbow Extension MMT, Gross Movement  (5/5) normal  -       Sensation    Sensation WNL?  WNL  -MH      User Key  (r) = Recorded By, (t) = Taken By, (c) = Cosigned By    Initials Name Provider Type    Roxanna Whitman, PT Physical Therapist                      Therapy Education  Education Details: Educated on PT role and POC; discussed findings from evaluation, expected outcomes/timeframe for healing. Access Code: [email or web address]  Given: HEP, Symptoms/condition management, Posture/body mechanics, Mobility training  Program: New  How Provided: Verbal, Demonstration, Written  Provided to: Patient  Level of Understanding: Verbalized, Demonstrated     PT OP Goals     Row Name 04/01/21 1800          PT Short Term Goals    STG Date to Achieve  04/15/21  -     STG 1   Pt. Will be independent with initial HEP to improve self-management of condition.  -     STG 1 Progress  New  -     STG 2  Pt. Will demonstrate and voice understanding of importance of posture with ADLs and with working from desk to promote optimal shoulder position and reduce pain.  -     STG 2 Progress  New  [facility]        Long Term Goals    LTG Date to Achieve  05/02/21  -     LTG 1  Pt. Will be independent with advanced HEP to improve long-term management of condition and independence.  -     LTG 1 Progress  New  [facility]     LTG 2  Pt. Will score </= 25 on QuickDASH  (from 38 on initial evaluation) to indicate improved perception of disability.  -     LTG 2 Progress  New  [facility]     LTG 3  Pt. will report pain </= 2/10 thorughout day to improve QOL.  -     LTG 3 Progress  New  [facility]     LTG 4  Pt. will improve R shoulder strength >/= 4+/5 and painfree to ease ability to complete household chores.  -     LTG 4 Progress  New  [facility]        Time Calculation    PT Goal Re-Cert Due Date  06/30/21  -       User Key  (r) = Recorded By, (t) = Taken By, (c) = Cosigned By    Initials Name Provider Type     Roxanna Bryan, PT Physical Therapist          PT Assessment/Plan     Row Name 04/01/21 8958          PT Assessment    Functional Limitations  Limitation in home management;Limitations in community activities;Performance in self-care ADL;Performance in work activities;Performance in sport activities;Performance in leisure activities  -     Impairments  Impaired flexibility;Impaired muscle power;Joint mobility;Muscle strength;Pain;Poor body mechanics;Posture;Range of motion  -     Assessment Comments  Angely Mathias is a 60 y.o. year-old female referred to physical therapy for ongoing R shoulder pain that began 8/11/2020 after picking up grandson. She presents with a evolving clinical presentation. 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 Self scored disability measure of QuickDASH was a 38.64 (where 0 is no disability). She demonstrated pain/tenderness to palpation cervical musculature/supraspintaus and long head of biceps insertion, (+) H-K, Neers impingement, Speeds test, pt. Demonstrated painful but strong MMT and painful but full shoulder ROM. Signs and symptoms are consistent with referring diagnosis. She is appropriate for skilled therapy services at this time to address deficits and improve ease with ADLs and ease pain to improve QOL when caring for grandchildren.  -     Please refer to paper survey for additional self-reported information  Yes  -     Rehab Potential  Good  -     Patient/caregiver participated in establishment of treatment plan and goals  Yes  -     Patient would benefit from skilled therapy intervention  Yes  -        PT Plan    PT Frequency  2x/week;1x/week  -     Predicted Duration of Therapy Intervention (PT)  8 visits  -     Planned CPT's?  PT EVAL MOD COMPLELITY: 09515;PT RE-EVAL: 40336;PT THER PROC EA 15 MIN: 69552;PT THER ACT EA 15 MIN: 32149;PT MANUAL THERAPY EA 15 MIN: 48572;PT NEUROMUSC RE-EDUCATION EA 15 MIN: 20402;PT SELF CARE/HOME MGMT/TRAIN EA 15: 88793;PT HOT OR COLD PACK TREAT [facility];PT ELECTRICAL STIM UNATTEND: ;PT ULTRASOUND EA 15 MIN: 42804;PT TRACTION CERVICAL: 35134;PT SELF CARE/MGMT/TRAIN 15 MIN: 20425  -     PT Plan Comments  Assess response to intial HEP for shoulder; consider warm up on UBE, pulleys?, serratus punches, prone row, manual for pain relief  -       User Key  (r) = Recorded By, (t) = Taken By, (c) = Cosigned By    Initials Name Provider Type     Roxanna Bryan, PT Physical Therapist            OP Exercises     Row Name 04/01/21 1800             Total Minutes    01499 - PT Therapeutic Exercise Minutes  8  -          Exercise 1    Exercise Name 1  shoulder rolls posterior  -      Cueing 1  Verbal  -MH      Reps 1  10  -MH         Exercise 2    Exercise Name 2  pec stretch at doorway  -MH      Cueing 2  Verbal;Demo  -MH      Reps 2  2  -MH      Time 2  20sec  -MH         Exercise 3    Exercise Name 3  ER isometric  -MH      Cueing 3  Verbal;Demo  -MH      Reps 3  5  -MH      Time 3  5  -MH         Exercise 4    Exercise Name 4  delt flexion isometric  -MH      Cueing 4  Verbal;Demo  -MH      Reps 4  5  -MH      Time 4  5  -MH        User Key  (r) = Recorded By, (t) = Taken By, (c) = Cosigned By    Initials Name Provider Type    Roxanna Whitman, PT Physical Therapist                        Outcome Measure Options: Quick DASH  Quick DASH  Open a tight or new jar.: Mild Difficulty  Do heavy household chores (e.g., wash walls, wash floors): Moderate Difficulty  Carry a shopping bag or briefcase: Moderate Difficulty  Wash your back: Moderate Difficulty  Use a knife to cut food: No Difficulty  Recreational activities in which you take some force or impact through your arm, should or hand (e.g. golf, hammering, tennis, etc.): Moderate Difficulty  During the past week, to what extent has your arm, shoulder, or hand problem interfered with your normal social activites with family, friends, neighbors or groups?: Moderately  During the past week, were you limited in your work or other regular daily activities as a result of your arm, shoulder or hand problem?: Moderately Limited  Arm, Shoulder, or hand pain: Moderate  Tingling (pins and needles) in your arm, shoulder, or hand: None  During the past week, how much difficulty have you had sleeping because of the pain in your arm, shoulder or hand?: Moderate Difficiculty  Number of Questions Answered: 11  Quick DASH Score: 38.64         Time Calculation:     Start Time: 1746  Stop Time: 1820  Time Calculation (min): 34 min  Total Timed Code Minutes- PT: 8 minute(s)     Therapy  Charges for Today     Code Description Service Date Service Provider Modifiers Qty    45095951507 HC PT THER PROC EA 15 MIN 4/1/2021 Roxanna Bryan, PT GP 1    18962285677 HC PT EVAL LOW COMPLEXITY 2 4/1/2021 Roxanna Bryan, PT GP 1          PT G-Codes  Outcome Measure Options: Quick DASH  Quick DASH Score: 38.64         Roxanna Bryan, PT  4/1/2021

## 2021-04-05 DIAGNOSIS — F90.0 ATTENTION DEFICIT HYPERACTIVITY DISORDER (ADHD), PREDOMINANTLY INATTENTIVE TYPE: ICD-10-CM

## 2021-04-05 RX ORDER — DEXTROAMPHETAMINE SACCHARATE, AMPHETAMINE ASPARTATE MONOHYDRATE, DEXTROAMPHETAMINE SULFATE AND AMPHETAMINE SULFATE 7.5; 7.5; 7.5; 7.5 MG/1; MG/1; MG/1; MG/1
30 CAPSULE, EXTENDED RELEASE ORAL EVERY MORNING
Qty: 30 CAPSULE | Refills: 0 | Status: SHIPPED | OUTPATIENT
Start: 2021-04-05 | End: 2021-05-10 | Stop reason: SDUPTHER

## 2021-04-06 ENCOUNTER — APPOINTMENT (OUTPATIENT)
Dept: PHYSICAL THERAPY | Facility: HOSPITAL | Age: 61
End: 2021-04-06

## 2021-04-07 DIAGNOSIS — F90.0 ATTENTION DEFICIT HYPERACTIVITY DISORDER (ADHD), PREDOMINANTLY INATTENTIVE TYPE: ICD-10-CM

## 2021-04-07 RX ORDER — DEXTROAMPHETAMINE SACCHARATE, AMPHETAMINE ASPARTATE MONOHYDRATE, DEXTROAMPHETAMINE SULFATE AND AMPHETAMINE SULFATE 2.5; 2.5; 2.5; 2.5 MG/1; MG/1; MG/1; MG/1
10 CAPSULE, EXTENDED RELEASE ORAL EVERY MORNING
Qty: 30 CAPSULE | Refills: 0 | Status: SHIPPED | OUTPATIENT
Start: 2021-04-07 | End: 2021-04-07 | Stop reason: SDUPTHER

## 2021-04-07 RX ORDER — DEXTROAMPHETAMINE SACCHARATE, AMPHETAMINE ASPARTATE MONOHYDRATE, DEXTROAMPHETAMINE SULFATE AND AMPHETAMINE SULFATE 2.5; 2.5; 2.5; 2.5 MG/1; MG/1; MG/1; MG/1
10 CAPSULE, EXTENDED RELEASE ORAL EVERY MORNING
Qty: 30 CAPSULE | Refills: 0 | Status: SHIPPED | OUTPATIENT
Start: 2021-04-07 | End: 2021-05-10 | Stop reason: SDUPTHER

## 2021-04-08 ENCOUNTER — HOSPITAL ENCOUNTER (OUTPATIENT)
Dept: PHYSICAL THERAPY | Facility: HOSPITAL | Age: 61
Setting detail: THERAPIES SERIES
Discharge: HOME | End: 2021-04-08

## 2021-04-08 DIAGNOSIS — M25.511 CHRONIC RIGHT SHOULDER PAIN: ICD-10-CM

## 2021-04-08 DIAGNOSIS — G89.29 CHRONIC RIGHT SHOULDER PAIN: ICD-10-CM

## 2021-04-08 DIAGNOSIS — R29.3 POOR POSTURE: Primary | ICD-10-CM

## 2021-04-08 PROCEDURE — 97110 THERAPEUTIC EXERCISES: CPT

## 2021-04-08 PROCEDURE — 97140 MANUAL THERAPY 1/> REGIONS: CPT

## 2021-04-08 NOTE — THERAPY TREATMENT NOTE
Outpatient Physical Therapy Ortho Treatment Note  [facility]     Patient Name: Angely Mathias  : 1960  MRN: [id number]  Today's Date: 2021      Visit Date: 2021    Visit Dx:    ICD-10-CM ICD-9-CM   1. Poor posture  R29.3 781.92   2. Chronic right shoulder pain  M25.511 719.41    G89.29 338.29       Patient Active Problem List   Diagnosis   • Atopic rhinitis   • Hyperlipidemia   • Chronic low back pain   • Neck pain   • Persistent insomnia   • Overweight   • Lung nodule   • Attention deficit hyperactivity disorder (ADHD), predominantly inattentive type   • Chronic bronchitis (CMS/HCC)   • Other specified glaucoma   • Intervertebral disc disorder with radiculopathy of lumbar region        Past Medical History:   Diagnosis Date   • Acute bronchitis    • Acute pain    • ADHD (attention deficit hyperactivity disorder)     years ago. worse w/ job type change   • Allergic     since i was little   • Blood tests for routine general physical examination    • Cholelithiasis     resolved with GB removal   • Diarrhea    • Glaucoma     treated w/ eye drops   • Headache 1968    ocular and allergy induced   • Hyperlipidemia     noted on past lab work   • Low back pain     occasional chronic   • Microscopic hematuria    • Obesity     at times   • Visual impairment 1965    wear contacts/glasses        Past Surgical History:   Procedure Laterality Date   • ABDOMINOPLASTY     • BREAST SURGERY      Enlargement Procedure   • CHOLECYSTECTOMY     • COLONOSCOPY     • COSMETIC SURGERY      breast aug 1989 and    • DIAGNOSTIC LAPAROSCOPY EXPLORATORY LAPAROTOMY     • DILATATION AND CURETTAGE     • EPIDURAL BLOCK     • EYE SURGERY  1996   • OTHER SURGICAL HISTORY      Vaginal Sling Operation for Stress Incontinence   • TUBAL ABDOMINAL LIGATION     • WRIST SURGERY                         PT Assessment/Plan     Row Name 21 1827          PT Assessment    Assessment  Comments  Ms. Mathias returns for first follow up visit from shoulder evaluation noting no signficant improvement in pain. Notes she has difficulty reaching from mouse to keyboard when working, discussed use of keyboard tray or pillow to allow arms to rest at side. Pt. voiced understanding. Performed gentle joint mobs, osciallation and PROM within pt. tolerance. Added supine pec stretch, UBE, pulleys and AAROM shoulder flexiion. Pt. notes slight increase in pain at end of session.  -        PT Plan    PT Plan Comments  shoulder: s/l er? H-A, rows, ext  -MH       User Key  (r) = Recorded By, (t) = Taken By, (c) = Cosigned By    Initials Name Provider Type     Roxanna Bryan, PT Physical Therapist            OP Exercises     Row Name 04/08/21 1700             Precautions    Existing Precautions/Restrictions  other (see comments) Performed exercises with low back on ice pack  -         Subjective Comments    Subjective Comments  It hurts when I reach across my body  -         Subjective Pain    Able to rate subjective pain?  yes  -      Pre-Treatment Pain Level  5  -MH         Total Minutes    60609 - PT Therapeutic Exercise Minutes  25  -MH      41913 - PT Manual Therapy Minutes  15  -MH         Exercise 1    Exercise Name 1  shoulder rolls posterior  -      Cueing 1  Verbal  -MH      Reps 1  10  -MH      Time 1  --  -MH         Exercise 2    Exercise Name 2  pec stretch at doorway  -      Cueing 2  Verbal;Demo  -MH      Reps 2  2  -MH      Time 2  20sec  -MH         Exercise 3    Exercise Name 3  ER isometric  -MH      Cueing 3  Verbal;Demo  -MH      Reps 3  5  -MH      Time 3  5  -MH         Exercise 4    Exercise Name 4  delt flexion isometric  -      Cueing 4  Verbal;Demo  -MH      Reps 4  5  -MH      Time 4  5  -MH         Exercise 5    Exercise Name 5  UBE  -MH      Cueing 5  Verbal  -MH      Reps 5  --  -MH      Time 5  4 min  -MH      Additional Comments  2/2  -MH         Exercise 6    Exercise  Name 6  pulleys  -      Cueing 6  Verbal  -MH      Reps 6  15  -MH      Time 6  --  -MH      Additional Comments  flexion  -MH         Exercise 7    Exercise Name 7  supine pec stretch  -MH      Cueing 7  Verbal  -MH      Reps 7  3  -MH      Time 7  20  -MH         Exercise 8    Exercise Name 8  supine AAROM flexion  -MH      Cueing 8  Verbal;Demo  -MH      Reps 8  10  -MH      Time 8  5  -MH         Exercise 9    Exercise Name 9  --  -MH      Cueing 9  --  -MH      Time 9  --  -        User Key  (r) = Recorded By, (t) = Taken By, (c) = Cosigned By    Initials Name Provider Type     DeisyerMyleneRoxanna, PT Physical Therapist                      Manual Rx (last 36 hours)      Manual Treatments     Row Name 04/08/21 1700             Total Minutes    73476 - PT Manual Therapy Minutes  15  -MH         Manual Rx 1    Manual Rx 1 Location  R shoulder PROM flexion/ER  -      Manual Rx 1 Type  gentle oscillations   -      Manual Rx 1 Grade  joint mobs post/inferior  -        User Key  (r) = Recorded By, (t) = Taken By, (c) = Cosigned By    Initials Name Provider Type     Roxanna Bryan, PT Physical Therapist          PT OP Goals     Row Name 04/08/21 1700          PT Short Term Goals    STG Date to Achieve  04/15/21  -     STG 1  Pt. Will be independent with initial HEP to improve self-management of condition.  -     STG 1 Progress  Ongoing  -     STG 2  Pt. Will demonstrate and voice understanding of importance of posture with ADLs and with working from desk to promote optimal shoulder position and reduce pain.  -     STG 2 Progress  Ongoing  -        Long Term Goals    LTG Date to Achieve  05/02/21  -     LTG 1  Pt. Will be independent with advanced HEP to improve long-term management of condition and independence.  -     LTG 1 Progress  Ongoing  -     LTG 2  Pt. Will score </= 25 on QuickDASH  (from 38 on initial evaluation) to indicate improved perception of disability.  -     LTG 2  Progress  Ongoing  -     LTG 3  Pt. will report pain </= 2/10 thorughout day to improve QOL.  -     LTG 3 Progress  Ongoing  -     LTG 4  Pt. will improve R shoulder strength >/= 4+/5 and painfree to ease ability to complete household chores.  -     LTG 4 Progress  Ongoing  -       User Key  (r) = Recorded By, (t) = Taken By, (c) = Cosigned By    Initials Name Provider Type     Roxanna Bryan, PT Physical Therapist          Therapy Education  Education Details: Educated to have keyboard/mouse placement lower or have arms resting on pillow, keyboard tray, etc. tgo avoid holding arms up all day.  Given: Posture/body mechanics  Program: Reinforced  How Provided: Verbal, Demonstration  Provided to: Patient  Level of Understanding: Verbalized, Demonstrated              Time Calculation:   Start Time: 1745  Stop Time: 1825  Time Calculation (min): 40 min  Total Timed Code Minutes- PT: 40 minute(s)  Therapy Charges for Today     Code Description Service Date Service Provider Modifiers Qty    [id number]  PT MANUAL THERAPY EA 15 MIN 4/8/2021 Roxanna Bryan, PT GP 1    [id number]  PT THER PROC EA 15 MIN 4/8/2021 Roxanna Bryan, PT GP 2                    Roxanna Bryan PT  4/8/2021

## 2021-04-15 ENCOUNTER — APPOINTMENT (OUTPATIENT)
Dept: PHYSICAL THERAPY | Facility: HOSPITAL | Age: 61
End: 2021-04-15

## 2021-04-20 ENCOUNTER — HOSPITAL ENCOUNTER (OUTPATIENT)
Dept: PHYSICAL THERAPY | Facility: HOSPITAL | Age: 61
Setting detail: THERAPIES SERIES
Discharge: HOME | End: 2021-04-20

## 2021-04-20 DIAGNOSIS — M79.605 PAIN OF LEFT LOWER EXTREMITY: ICD-10-CM

## 2021-04-20 DIAGNOSIS — M54.42 ACUTE LEFT-SIDED LOW BACK PAIN WITH LEFT-SIDED SCIATICA: Primary | ICD-10-CM

## 2021-04-20 PROCEDURE — 97140 MANUAL THERAPY 1/> REGIONS: CPT

## 2021-04-20 PROCEDURE — 97110 THERAPEUTIC EXERCISES: CPT

## 2021-04-20 NOTE — THERAPY PROGRESS REPORT/RE-CERT
Outpatient Physical Therapy Ortho Progress Note  [facility]     Patient Name: Angely Mathias  : 1960  MRN: [id number]  Today's Date: 2021      Visit Date: 2021    Visit Dx:    ICD-10-CM ICD-9-CM   1. Acute left-sided low back pain with left-sided sciatica  M54.42 724.2     724.3   2. Pain of left lower extremity  M79.605 729.5       Patient Active Problem List   Diagnosis   • Atopic rhinitis   • Hyperlipidemia   • Chronic low back pain   • Neck pain   • Persistent insomnia   • Overweight   • Lung nodule   • Attention deficit hyperactivity disorder (ADHD), predominantly inattentive type   • Chronic bronchitis (CMS/HCC)   • Other specified glaucoma   • Intervertebral disc disorder with radiculopathy of lumbar region        Past Medical History:   Diagnosis Date   • Acute bronchitis    • Acute pain    • ADHD (attention deficit hyperactivity disorder)     years ago. worse w/ job type change   • Allergic     since i was little   • Blood tests for routine general physical examination    • Cholelithiasis     resolved with GB removal   • Diarrhea    • Glaucoma     treated w/ eye drops   • Headache 1968    ocular and allergy induced   • Hyperlipidemia 2016    noted on past lab work   • Low back pain     occasional chronic   • Microscopic hematuria    • Obesity     at times   • Visual impairment 1965    wear contacts/glasses        Past Surgical History:   Procedure Laterality Date   • ABDOMINOPLASTY     • BREAST SURGERY      Enlargement Procedure   • CHOLECYSTECTOMY     • COLONOSCOPY     • COSMETIC SURGERY      breast aug 1989 and    • DIAGNOSTIC LAPAROSCOPY EXPLORATORY LAPAROTOMY     • DILATATION AND CURETTAGE     • EPIDURAL BLOCK     • EYE SURGERY  1996   • OTHER SURGICAL HISTORY      Vaginal Sling Operation for Stress Incontinence   • TUBAL ABDOMINAL LIGATION     • WRIST SURGERY                         PT Assessment/Plan     Row Name 21 1100           PT Assessment    Functional Limitations  Limitation in home management;Limitations in community activities;Performance in self-care ADL;Performance in work activities;Performance in sport activities;Performance in leisure activities  -     Impairments  Impaired flexibility;Impaired muscle power;Joint mobility;Muscle strength;Pain;Poor body mechanics;Posture;Range of motion  -     Assessment Comments  Angely Mathias has been seen for 6 physical therapy sessions for L sided LBP. Treatment has included therapeutic exercise, manual therapy, therapeutic activity and patient education with home exercise program . Progress to physical therapy goals is slow as pt. Has met 1/3 STGs, and progressing toward 4 LTGs. Pt. Progress thus far has been limited secondary to initiating therapy for shoulder due to continued/increasin pain in shoulder. Pt. Reports feeling roughly 75% improvement in back pain since beginning therapy, however, last several days pain began to increase again. Pt. Plans to receive epidural in ~1 week and hopeful will help to resolve remaining symptoms. Pt. Has difficulty recognizing aggravating/alleviating factors and moves rather impulsively possibly contributing to slow progress thus far. She will benefit from continued skilled physical therapy to address remaining impairments and functional limitations.  -     Please refer to paper survey for additional self-reported information  Yes  -MH     Rehab Potential  Good  -     Patient/caregiver participated in establishment of treatment plan and goals  Yes  -     Patient would benefit from skilled therapy intervention  Yes  -MH        PT Plan    PT Frequency  2x/week  -     Predicted Duration of Therapy Intervention (PT)  8 visits  -     Planned CPT's?  PT RE-EVAL: 13531;PT THER PROC EA 15 MIN: 66451;PT THER ACT EA 15 MIN: 26115;PT MANUAL THERAPY EA 15 MIN: 42315;PT NEUROMUSC RE-EDUCATION EA 15 MIN: 02455;PT GAIT TRAINING EA 15 MIN: 08723;PT SELF  CARE/HOME MGMT/TRAIN EA 15: 62897;PT HOT OR COLD PACK TREAT [facility]ARE;PT ELECTRICAL STIM UNATTEND: ;PT ULTRASOUND EA 15 MIN: 64831;PT TRACTION LUMBAR: 09059;PT SELF CARE/MGMT/TRAIN 15 MIN: 47988;PT THER MASS EA 15 MIN: 19323  -MH     PT Plan Comments  consider shoulder ext + TrA, AR  -MH       User Key  (r) = Recorded By, (t) = Taken By, (c) = Cosigned By    Initials Name Provider Type     Roxanna Bryan, PT Physical Therapist            OP Exercises     Row Name 04/20/21 1100 04/20/21 1000          Precautions    Existing Precautions/Restrictions  --  other (see comments) Performed exercises with low back on ice pack  -        Subjective Pain    Able to rate subjective pain?  --  yes  -     Pre-Treatment Pain Level  --  7  -MH        Total Minutes    45746 - PT Therapeutic Exercise Minutes  --  24  -MH     86602 - PT Manual Therapy Minutes  16  -MH  --        Exercise 1    Exercise Name 1  --  prone on elbows  -MH     Cueing 1  --  Verbal  -MH     Time 1  --  2 min  -MH        Exercise 2    Exercise Name 2  --  pirformis stretch + fig 4   -MH     Cueing 2  --  Verbal  -MH     Reps 2  --  3  -MH     Time 2  --  20sec  -MH        Exercise 3    Exercise Name 3  --  H/L TrA  -MH     Cueing 3  --  Verbal  -MH     Reps 3  --  10  -MH     Time 3  --  5sec  -MH        Exercise 4    Exercise Name 4  --  bridge + add  -MH     Cueing 4  --  Verbal  -MH     Reps 4  --  15  -MH     Time 4  --  2-3sec  -MH        Exercise 5    Exercise Name 5  --  prone hip extension  -MH     Cueing 5  --  Verbal  -MH     Reps 5  --  10e  -MH     Additional Comments  --  R LE cramping  -MH        Exercise 6    Exercise Name 6  --  --  -MH     Cueing 6  --  --  -MH     Reps 6  --  --  -MH     Time 6  --  --  -MH        Exercise 7    Exercise Name 7  --  --  -MH     Cueing 7  --  --  -MH     Reps 7  --  --  -MH        Exercise 8    Exercise Name 8  --  prone HS curls  -MH     Cueing 8  --  Verbal  -MH     Reps 8  --  10e  -MH      Additional Comments  --  2#  -        Exercise 9    Exercise Name 9  --  NuStep  -     Cueing 9  --  Verbal;Tactile  -     Time 9  --  5 min  -     Additional Comments  --  B UE/LE   -        Exercise 10    Exercise Name 10  --  90/90 decompression  -     Cueing 10  --  Verbal  -     Time 10  --  3 min  -     Additional Comments  --  at end of session  -       User Key  (r) = Recorded By, (t) = Taken By, (c) = Cosigned By    Initials Name Provider Type     Roxanna Bryan, PT Physical Therapist                      Manual Rx (last 36 hours)      Manual Treatments     Row Name 04/20/21 1100             Total Minutes    08206 - PT Manual Therapy Minutes  16  -         Manual Rx 1    Manual Rx 1 Location  S/L on R, STM glutes/piriformis  -         Manual Rx 2    Manual Rx 2 Location  LAD 4 x30  -      Manual Rx 2 Type  lumbar distraction with mob belt in H/L  -        User Key  (r) = Recorded By, (t) = Taken By, (c) = Cosigned By    Initials Name Provider Type     Roxanna Bryan, PT Physical Therapist          PT OP Goals     Row Name 04/20/21 1000          PT Short Term Goals    STG Date to Achieve  03/23/21  -     STG 1  Pt. Will be independent with initial HEP to improve self-management of condition.  -     STG 1 Progress  Ongoing  -     STG 1 Progress Comments  inconsistently performing and continuing to require cues intermittently; difficulty progressing HEP for back due to initating therapy for shoulder  -     STG 2  Pt. Will demonstrate proper log roll technique without cueing to reduce lumbar strain and preserve spine.  -     STG 2 Progress  Partially Met  -     STG 2 Progress Comments  will perform but intermittently needing cues for awareness  -     STG 3  Pt. Will report 25% improvement in pain and centralization of symptoms to improve QOL.  -     STG 3 Progress  Met  -     STG 3 Progress Comments  75% improvement, last few days have been worse but unsure why   -        Long Term Goals    LTG Date to Achieve  04/09/21  -     LTG 1  Pt. Will be independent with advanced HEP to improve long-term management of condition and independence.  -     LTG 1 Progress  Ongoing  -     LTG 1 Progress Comments  see STG 1; slow progression of HEP  -     LTG 2  Pt. Will score </= 25% on Modified Oswestry  (from 44% on initial evaluation) to indicate improved perception of disability.  -     LTG 2 Progress  Ongoing  -     LTG 3  Pt. will demonstrate proper lifting techniques to preserve spine with return to hobbies such as gardening.  -     LTG 3 Progress  Ongoing  -     LTG 4  Pt. will improve L LE strength to >/= 4+/5 without pain to reduce undue strain on R side.  -     LTG 4 Progress  Ongoing  -       User Key  (r) = Recorded By, (t) = Taken By, (c) = Cosigned By    Initials Name Provider Type     Roxanna Bryan, PT Physical Therapist                         Time Calculation:   Start Time: 1046  Stop Time: 1127  Time Calculation (min): 41 min  Total Timed Code Minutes- PT: 40 minute(s)  Therapy Charges for Today     Code Description Service Date Service Provider Modifiers Qty    [id number]  PT MANUAL THERAPY EA 15 MIN 4/20/2021 Roxanna Bryan, PT GP 1    [id number]  PT THER PROC EA 15 MIN 4/20/2021 Roxanna Bryan, PT GP 2                    Roxanna Bryan PT  4/20/2021

## 2021-04-20 NOTE — THERAPY PROGRESS REPORT/RE-CERT
Outpatient Physical Therapy Ortho Progress Note  [facility]     Patient Name: Angely Mathias  : 1960  MRN: [id number]  Today's Date: 2021      Visit Date: 2021    Visit Dx:    ICD-10-CM ICD-9-CM   1. Acute left-sided low back pain with left-sided sciatica  M54.42 724.2     724.3   2. Pain of left lower extremity  M79.605 729.5       Patient Active Problem List   Diagnosis   • Atopic rhinitis   • Hyperlipidemia   • Chronic low back pain   • Neck pain   • Persistent insomnia   • Overweight   • Lung nodule   • Attention deficit hyperactivity disorder (ADHD), predominantly inattentive type   • Chronic bronchitis (CMS/HCC)   • Other specified glaucoma   • Intervertebral disc disorder with radiculopathy of lumbar region        Past Medical History:   Diagnosis Date   • Acute bronchitis    • Acute pain    • ADHD (attention deficit hyperactivity disorder)     years ago. worse w/ job type change   • Allergic     since i was little   • Blood tests for routine general physical examination    • Cholelithiasis     resolved with GB removal   • Diarrhea    • Glaucoma     treated w/ eye drops   • Headache 1968    ocular and allergy induced   • Hyperlipidemia 2016    noted on past lab work   • Low back pain     occasional chronic   • Microscopic hematuria    • Obesity     at times   • Visual impairment 1965    wear contacts/glasses        Past Surgical History:   Procedure Laterality Date   • ABDOMINOPLASTY     • BREAST SURGERY      Enlargement Procedure   • CHOLECYSTECTOMY     • COLONOSCOPY     • COSMETIC SURGERY      breast aug 1989 and    • DIAGNOSTIC LAPAROSCOPY EXPLORATORY LAPAROTOMY     • DILATATION AND CURETTAGE     • EPIDURAL BLOCK     • EYE SURGERY  1996   • OTHER SURGICAL HISTORY      Vaginal Sling Operation for Stress Incontinence   • TUBAL ABDOMINAL LIGATION     • WRIST SURGERY                         PT Assessment/Plan     Row Name 21 1100           PT Assessment    Functional Limitations  Limitation in home management;Limitations in community activities;Performance in self-care ADL;Performance in work activities;Performance in sport activities;Performance in leisure activities  -     Impairments  Impaired flexibility;Impaired muscle power;Joint mobility;Muscle strength;Pain;Poor body mechanics;Posture;Range of motion  -     Assessment Comments  Angely Mathias has been seen for 6 physical therapy sessions for L sided LBP. Treatment has included therapeutic exercise, manual therapy, therapeutic activity and patient education with home exercise program . Progress to physical therapy goals is slow as pt. Has met 1/3 STGs, and progressing toward 4 LTGs. Pt. Progress thus far has been limited secondary to initiating therapy for shoulder due to continued/increasin pain in shoulder. Pt. Reports feeling roughly 75% improvement in back pain since beginning therapy, however, last several days pain began to increase again. Pt. Plans to receive epidural in ~1 week and hopeful will help to resolve remaining symptoms. Pt. Has difficulty recognizing aggravating/alleviating factors and moves rather impulsively possibly contributing to slow progress thus far. She will benefit from continued skilled physical therapy to address remaining impairments and functional limitations.  -     Please refer to paper survey for additional self-reported information  Yes  -MH     Rehab Potential  Good  -     Patient/caregiver participated in establishment of treatment plan and goals  Yes  -     Patient would benefit from skilled therapy intervention  Yes  -MH        PT Plan    PT Frequency  2x/week  -     Predicted Duration of Therapy Intervention (PT)  8 visits  -     Planned CPT's?  PT RE-EVAL: 51256;PT THER PROC EA 15 MIN: 52177;PT THER ACT EA 15 MIN: 57865;PT MANUAL THERAPY EA 15 MIN: 40879;PT NEUROMUSC RE-EDUCATION EA 15 MIN: 76084;PT GAIT TRAINING EA 15 MIN: 49412;PT SELF  CARE/HOME MGMT/TRAIN EA 15: 40309;PT HOT OR COLD PACK TREAT MCARE;PT ELECTRICAL STIM UNATTEND: ;PT ULTRASOUND EA 15 MIN: 95978;PT TRACTION LUMBAR: 58440;PT SELF CARE/MGMT/TRAIN 15 MIN: 05047;PT THER MASS EA 15 MIN: 54891  -MH     PT Plan Comments  consider shoulder ext + TrA, AR  -MH       User Key  (r) = Recorded By, (t) = Taken By, (c) = Cosigned By    Initials Name Provider Type     Roxanna Bryan, PT Physical Therapist            OP Exercises     Row Name 04/20/21 1100 04/20/21 1000          Precautions    Existing Precautions/Restrictions  --  other (see comments) Performed exercises with low back on ice pack  -        Subjective Pain    Able to rate subjective pain?  --  yes  -     Pre-Treatment Pain Level  --  7  -MH        Total Minutes    29972 - PT Therapeutic Exercise Minutes  --  24  -MH     93965 - PT Manual Therapy Minutes  16  -MH  --        Exercise 1    Exercise Name 1  --  prone on elbows  -MH     Cueing 1  --  Verbal  -MH     Time 1  --  2 min  -MH        Exercise 2    Exercise Name 2  --  pirformis stretch + fig 4   -MH     Cueing 2  --  Verbal  -MH     Reps 2  --  3  -MH     Time 2  --  20sec  -MH        Exercise 3    Exercise Name 3  --  H/L TrA  -MH     Cueing 3  --  Verbal  -MH     Reps 3  --  10  -MH     Time 3  --  5sec  -MH        Exercise 4    Exercise Name 4  --  bridge + add  -MH     Cueing 4  --  Verbal  -MH     Reps 4  --  15  -MH     Time 4  --  2-3sec  -MH        Exercise 5    Exercise Name 5  --  prone hip extension  -MH     Cueing 5  --  Verbal  -MH     Reps 5  --  10e  -MH     Additional Comments  --  R LE cramping  -MH        Exercise 6    Exercise Name 6  --  --  -MH     Cueing 6  --  --  -MH     Reps 6  --  --  -MH     Time 6  --  --  -MH        Exercise 7    Exercise Name 7  --  --  -MH     Cueing 7  --  --  -MH     Reps 7  --  --  -MH        Exercise 8    Exercise Name 8  --  prone HS curls  -MH     Cueing 8  --  Verbal  -MH     Reps 8  --  10e  -MH      Additional Comments  --  2#  -        Exercise 9    Exercise Name 9  --  NuStep  -     Cueing 9  --  Verbal;Tactile  -     Time 9  --  5 min  -     Additional Comments  --  B UE/LE   -        Exercise 10    Exercise Name 10  --  90/90 decompression  -     Cueing 10  --  Verbal  -     Time 10  --  3 min  -     Additional Comments  --  at end of session  -       User Key  (r) = Recorded By, (t) = Taken By, (c) = Cosigned By    Initials Name Provider Type     Roxanna Bryan, PT Physical Therapist                      Manual Rx (last 36 hours)      Manual Treatments     Row Name 04/20/21 1100             Total Minutes    46090 - PT Manual Therapy Minutes  16  -         Manual Rx 1    Manual Rx 1 Location  S/L on R, STM glutes/piriformis  -         Manual Rx 2    Manual Rx 2 Location  LAD 4 x30  -      Manual Rx 2 Type  lumbar distraction with mob belt in H/L  -        User Key  (r) = Recorded By, (t) = Taken By, (c) = Cosigned By    Initials Name Provider Type     Roxanna Bryan, PT Physical Therapist          PT OP Goals     Row Name 04/20/21 1000          PT Short Term Goals    STG Date to Achieve  03/23/21  -     STG 1  Pt. Will be independent with initial HEP to improve self-management of condition.  -     STG 1 Progress  Ongoing  -     STG 1 Progress Comments  inconsistently performing and continuing to require cues intermittently; difficulty progressing HEP for back due to initating therapy for shoulder  -     STG 2  Pt. Will demonstrate proper log roll technique without cueing to reduce lumbar strain and preserve spine.  -     STG 2 Progress  Partially Met  -     STG 2 Progress Comments  will perform but intermittently needing cues for awareness  -     STG 3  Pt. Will report 25% improvement in pain and centralization of symptoms to improve QOL.  -     STG 3 Progress  Met  -     STG 3 Progress Comments  75% improvement, last few days have been worse but unsure why   -        Long Term Goals    LTG Date to Achieve  04/09/21  -     LTG 1  Pt. Will be independent with advanced HEP to improve long-term management of condition and independence.  -     LTG 1 Progress  Ongoing  -     LTG 1 Progress Comments  see STG 1; slow progression of HEP  -     LTG 2  Pt. Will score </= 25% on Modified Oswestry  (from 44% on initial evaluation) to indicate improved perception of disability.  -     LTG 2 Progress  Ongoing  -     LTG 3  Pt. will demonstrate proper lifting techniques to preserve spine with return to hobbies such as gardening.  -     LTG 3 Progress  Ongoing  -     LTG 4  Pt. will improve L LE strength to >/= 4+/5 without pain to reduce undue strain on R side.  -     LTG 4 Progress  Ongoing  -       User Key  (r) = Recorded By, (t) = Taken By, (c) = Cosigned By    Initials Name Provider Type     Roxanna Bryan, PT Physical Therapist               Outcome Measure Options: Modifed Martin  Modified Oswestry  Modified Oswestry Score/Comments: 22/50 44%      Time Calculation:   Start Time: 1046  Stop Time: 1127  Time Calculation (min): 41 min  Total Timed Code Minutes- PT: 40 minute(s)  Therapy Charges for Today     Code Description Service Date Service Provider Modifiers Qty    65308140089  PT MANUAL THERAPY EA 15 MIN 4/20/2021 Roxanna Bryan, PT GP 1    [id number]  PT THER PROC EA 15 MIN 4/20/2021 Roxanna Bryan, PT GP 2          PT G-Codes  Outcome Measure Options: Modifed [place]  Modified Oswestry Score/Comments: 22/50 44%         Roxanna Bryan PT  4/20/2021

## 2021-04-22 ENCOUNTER — HOSPITAL ENCOUNTER (OUTPATIENT)
Dept: PHYSICAL THERAPY | Facility: HOSPITAL | Age: 61
Setting detail: THERAPIES SERIES
Discharge: HOME | End: 2021-04-22

## 2021-04-22 DIAGNOSIS — M25.511 CHRONIC RIGHT SHOULDER PAIN: ICD-10-CM

## 2021-04-22 DIAGNOSIS — R29.3 POOR POSTURE: Primary | ICD-10-CM

## 2021-04-22 DIAGNOSIS — G89.29 CHRONIC RIGHT SHOULDER PAIN: ICD-10-CM

## 2021-04-22 PROCEDURE — 97110 THERAPEUTIC EXERCISES: CPT

## 2021-04-22 PROCEDURE — 97140 MANUAL THERAPY 1/> REGIONS: CPT

## 2021-04-22 NOTE — THERAPY TREATMENT NOTE
Outpatient Physical Therapy Ortho Treatment Note  [facility]     Patient Name: Angely Mathias  : 1960  MRN: [id number]  Today's Date: 2021      Visit Date: 2021    Visit Dx:    ICD-10-CM ICD-9-CM   1. Poor posture  R29.3 781.92   2. Chronic right shoulder pain  M25.511 719.41    G89.29 338.29       Patient Active Problem List   Diagnosis   • Atopic rhinitis   • Hyperlipidemia   • Chronic low back pain   • Neck pain   • Persistent insomnia   • Overweight   • Lung nodule   • Attention deficit hyperactivity disorder (ADHD), predominantly inattentive type   • Chronic bronchitis (CMS/HCC)   • Other specified glaucoma   • Intervertebral disc disorder with radiculopathy of lumbar region        Past Medical History:   Diagnosis Date   • Acute bronchitis    • Acute pain    • ADHD (attention deficit hyperactivity disorder)     years ago. worse w/ job type change   • Allergic     since i was little   • Blood tests for routine general physical examination    • Cholelithiasis     resolved with GB removal   • Diarrhea    • Glaucoma     treated w/ eye drops   • Headache 1968    ocular and allergy induced   • Hyperlipidemia     noted on past lab work   • Low back pain     occasional chronic   • Microscopic hematuria    • Obesity     at times   • Visual impairment 1965    wear contacts/glasses        Past Surgical History:   Procedure Laterality Date   • ABDOMINOPLASTY     • BREAST SURGERY      Enlargement Procedure   • CHOLECYSTECTOMY     • COLONOSCOPY     • COSMETIC SURGERY      breast aug 1989 and    • DIAGNOSTIC LAPAROSCOPY EXPLORATORY LAPAROTOMY     • DILATATION AND CURETTAGE     • EPIDURAL BLOCK     • EYE SURGERY  1996   • OTHER SURGICAL HISTORY      Vaginal Sling Operation for Stress Incontinence   • TUBAL ABDOMINAL LIGATION     • WRIST SURGERY                         PT Assessment/Plan     Row Name 21 1653          PT Assessment    Assessment  Comments  Ms. Mathias returns for treatment of R shoulder pain. Pt. reports pain continues to be intermittent and unexplained by any aggravating factors. Performed gentle manual with addition fo STM to UT and LS with pt. noting no significant relief following. Performed several isometric exercises to reduce tissue loading and improve irritation/inflammation of tissues, shoulder flexion isometric most painful therefore cued to reduce intensity. Due to irritability of pain and continued high levels of pain discussed with patient possible return to MD for further evaluation after coming sessions if no improvement.  -        PT Plan    PT Plan Comments  consider H-A  -       User Key  (r) = Recorded By, (t) = Taken By, (c) = Cosigned By    Initials Name Provider Type     Roxanna Bryan, PT Physical Therapist            OP Exercises     Row Name 04/22/21 1600 04/22/21 1500          Subjective Comments    Subjective Comments  It is just irritated bad  -  --        Total Minutes    50246 - PT Therapeutic Exercise Minutes  17  -MH  --     40808 - PT Manual Therapy Minutes  --  25  -MH        Exercise 1    Exercise Name 1  shoulder rolls posterior  -  --     Cueing 1  Verbal  -  --     Reps 1  10  -MH  --        Exercise 2    Exercise Name 2  elbow flexion  -  --     Cueing 2  Verbal;Demo  -  --     Reps 2  10  -MH  --     Time 2  5  -MH  --        Exercise 3    Exercise Name 3  ER isometric  -  --     Cueing 3  Verbal;Demo  -MH  --     Reps 3  10  -MH  --     Time 3  5  -MH  --     Additional Comments  into belt  -  --        Exercise 4    Exercise Name 4  delt flexion isometric  -  --     Cueing 4  Verbal;Demo  -MH  --     Reps 4  5  -MH  --     Time 4  5  -MH  --        Exercise 5    Exercise Name 5  UBE  -  --     Cueing 5  Verbal  -MH  --     Time 5  4 min  -MH  --        Exercise 6    Exercise Name 6  pulleys  -  --     Cueing 6  Verbal  -  --     Reps 6  15  -MH  --        Exercise 7     Exercise Name 7  posterior capsule stretch  -  --     Cueing 7  Verbal  -  --     Reps 7  3  -MH  --     Time 7  20  -MH  --        Exercise 8    Exercise Name 8  supine AAROM flexion  -  --     Cueing 8  Verbal;Demo  -  --     Reps 8  10  -MH  --     Time 8  5  -MH  --       User Key  (r) = Recorded By, (t) = Taken By, (c) = Cosigned By    Initials Name Provider Type     Roxanna Bryan, PT Physical Therapist                      Manual Rx (last 36 hours)      Manual Treatments     Row Name 04/22/21 1500             Total Minutes    54163 - PT Manual Therapy Minutes  25  -         Manual Rx 1    Manual Rx 1 Location  R shoulder PROM flexion/ER/IR  -      Manual Rx 1 Type  gentle oscillations   -      Manual Rx 1 Grade  joint mobs post/inferior  -         Manual Rx 2    Manual Rx 2 Location  UT stretch 3 x 30  -      Manual Rx 2 Type  STM UT, LS  -        User Key  (r) = Recorded By, (t) = Taken By, (c) = Cosigned By    Initials Name Provider Type     Roxanna Bryan, PT Physical Therapist          PT OP Goals     Row Name 04/22/21 1600          PT Short Term Goals    STG Date to Achieve  04/15/21  -     STG 1  Pt. Will be independent with initial HEP to improve self-management of condition.  -     STG 1 Progress  Ongoing  -     STG 2  Pt. Will demonstrate and voice understanding of importance of posture with ADLs and with working from desk to promote optimal shoulder position and reduce pain.  -     STG 2 Progress  Ongoing  -        Long Term Goals    LTG Date to Achieve  05/02/21  -     LTG 1  Pt. Will be independent with advanced HEP to improve long-term management of condition and independence.  -     LTG 1 Progress  Ongoing  -     LTG 2  Pt. Will score </= 25 on QuickDASH  (from 38 on initial evaluation) to indicate improved perception of disability.  -     LTG 2 Progress  Ongoing  -     LTG 3  Pt. will report pain </= 2/10 thorughout day to improve QOL.  -     LTG  3 Progress  Ongoing  -     LTG 4  Pt. will improve R shoulder strength >/= 4+/5 and painfree to ease ability to complete household chores.  -     LTG 4 Progress  Ongoing  -       User Key  (r) = Recorded By, (t) = Taken By, (c) = Cosigned By    Initials Name Provider Type    Roxanna Whitman PT Physical Therapist          Therapy Education  Education Details: Discussed plan moving forawrd, will assess pain relief over remaining sessions to decide if next step would be return to MD for possible injection  Given: Posture/body mechanics, Pain management  Program: Reinforced  How Provided: Verbal, Demonstration  Provided to: Patient  Level of Understanding: Verbalized, Demonstrated              Time Calculation:   Start Time: 1610  Stop Time: 1652  Time Calculation (min): 42 min  Total Timed Code Minutes- PT: 42 minute(s)  Time Calculation- PT  Start Time: 1610  Stop Time: 1652  Time Calculation (min): 42 min  Total Timed Code Minutes- PT: 42 minute(s)  Timed Charges  42368 - PT Therapeutic Exercise Minutes: 17  39484 - PT Manual Therapy Minutes: 25  Total Minutes  Timed Charges Total Minutes: 17   Total Minutes: 17  Therapy Charges for Today     Code Description Service Date Service Provider Modifiers Qty    41766185770  PT MANUAL THERAPY EA 15 MIN 4/22/2021 Roxanna Bryan, PT GP 2    [id number] HC PT THER PROC EA 15 MIN 4/22/2021 Roxanna Bryan, PT GP 1                    Roxanna Bryan PT  4/22/2021

## 2021-04-26 DIAGNOSIS — M54.50 LOW BACK PAIN, UNSPECIFIED BACK PAIN LATERALITY, UNSPECIFIED CHRONICITY, UNSPECIFIED WHETHER SCIATICA PRESENT: ICD-10-CM

## 2021-04-27 ENCOUNTER — HOSPITAL ENCOUNTER (OUTPATIENT)
Dept: GENERAL RADIOLOGY | Facility: HOSPITAL | Age: 61
Discharge: HOME | End: 2021-04-27

## 2021-04-27 ENCOUNTER — ANESTHESIA EVENT (OUTPATIENT)
Dept: PAIN MEDICINE | Facility: HOSPITAL | Age: 61
End: 2021-04-27

## 2021-04-27 ENCOUNTER — HOSPITAL ENCOUNTER (OUTPATIENT)
Dept: PAIN MEDICINE | Facility: HOSPITAL | Age: 61
Discharge: HOME | End: 2021-04-27

## 2021-04-27 ENCOUNTER — ANESTHESIA (OUTPATIENT)
Dept: PAIN MEDICINE | Facility: HOSPITAL | Age: 61
End: 2021-04-27

## 2021-04-27 VITALS
HEIGHT: 69 IN | DIASTOLIC BLOOD PRESSURE: 74 MMHG | OXYGEN SATURATION: 100 % | HEART RATE: 86 BPM | RESPIRATION RATE: 14 BRPM | BODY MASS INDEX: 29.47 KG/M2 | WEIGHT: 199 LBS | TEMPERATURE: 97.8 F | SYSTOLIC BLOOD PRESSURE: 128 MMHG

## 2021-04-27 DIAGNOSIS — M54.50 LOW BACK PAIN, UNSPECIFIED BACK PAIN LATERALITY, UNSPECIFIED CHRONICITY, UNSPECIFIED WHETHER SCIATICA PRESENT: ICD-10-CM

## 2021-04-27 DIAGNOSIS — R52 PAIN: ICD-10-CM

## 2021-04-27 DIAGNOSIS — M54.16 LUMBAR RADICULOPATHY: Primary | ICD-10-CM

## 2021-04-27 PROCEDURE — 25010000002 METHYLPREDNISOLONE PER 80 MG: Performed by: ANESTHESIOLOGY

## 2021-04-27 PROCEDURE — 77003 FLUOROGUIDE FOR SPINE INJECT: CPT

## 2021-04-27 RX ORDER — MIDAZOLAM HYDROCHLORIDE 1 MG/ML
1 INJECTION INTRAMUSCULAR; INTRAVENOUS
Status: DISCONTINUED | OUTPATIENT
Start: 2021-04-27 | End: 2021-04-28 | Stop reason: HOSPADM

## 2021-04-27 RX ORDER — HYDROCODONE BITARTRATE AND ACETAMINOPHEN 10; 325 MG/1; MG/1
1 TABLET ORAL EVERY 6 HOURS PRN
Qty: 50 TABLET | Refills: 0 | Status: SHIPPED | OUTPATIENT
Start: 2021-04-27 | End: 2021-08-10 | Stop reason: SDUPTHER

## 2021-04-27 RX ORDER — METHYLPREDNISOLONE ACETATE 80 MG/ML
80 INJECTION, SUSPENSION INTRA-ARTICULAR; INTRALESIONAL; INTRAMUSCULAR; SOFT TISSUE ONCE
Status: COMPLETED | OUTPATIENT
Start: 2021-04-27 | End: 2021-04-27

## 2021-04-27 RX ORDER — FENTANYL CITRATE 50 UG/ML
50 INJECTION, SOLUTION INTRAMUSCULAR; INTRAVENOUS AS NEEDED
Status: DISCONTINUED | OUTPATIENT
Start: 2021-04-27 | End: 2021-04-28 | Stop reason: HOSPADM

## 2021-04-27 RX ORDER — HYDROCODONE BITARTRATE AND ACETAMINOPHEN 10; 325 MG/1; MG/1
1 TABLET ORAL EVERY 6 HOURS PRN
Qty: 50 TABLET | Refills: 0 | Status: CANCELLED | OUTPATIENT
Start: 2021-04-27

## 2021-04-27 RX ORDER — LIDOCAINE HYDROCHLORIDE 10 MG/ML
1 INJECTION, SOLUTION INFILTRATION; PERINEURAL ONCE
Status: DISCONTINUED | OUTPATIENT
Start: 2021-04-27 | End: 2021-04-28 | Stop reason: HOSPADM

## 2021-04-27 RX ADMIN — METHYLPREDNISOLONE ACETATE 80 MG: 80 INJECTION, SUSPENSION INTRA-ARTICULAR; INTRALESIONAL; INTRAMUSCULAR; SOFT TISSUE at 11:59

## 2021-04-27 NOTE — H&P
[facility]    History and Physical    Patient Name: Angely Mathias  :  1960  MRN:  [id number]  Date of Admission: 2021    Subjective     Patient is a 60 y.o. female presents with chief complaint of chronic, intermitent, moderate low back and leg: bilateral pain.  Onset of symptoms was gradual starting several months ago.  Symptoms are associated/aggravated by activity, exercise, lifting, standing or twisting. Symptoms improve with rest    The following portions of the patients history were reviewed and updated as appropriate: current medications, allergies, past medical history, past surgical history, past family history, past social history and problem list                Objective     Past Medical History:   Past Medical History:   Diagnosis Date   • Acute bronchitis    • Acute pain    • ADHD (attention deficit hyperactivity disorder)     years ago. worse w/ job type change   • Allergic     since i was little   • Blood tests for routine general physical examination    • Cholelithiasis     resolved with GB removal   • Diarrhea    • Glaucoma     treated w/ eye drops   • Headache     ocular and allergy induced   • Hyperlipidemia     noted on past lab work   • Low back pain     occasional chronic   • Microscopic hematuria    • Obesity     at times   • Visual impairment 1965    wear contacts/glasses     Past Surgical History:   Past Surgical History:   Procedure Laterality Date   • ABDOMINOPLASTY     • BREAST SURGERY      Enlargement Procedure   • CHOLECYSTECTOMY     • COLONOSCOPY     • COSMETIC SURGERY      breast aug 1989 and    • DIAGNOSTIC LAPAROSCOPY EXPLORATORY LAPAROTOMY     • DILATATION AND CURETTAGE     • EPIDURAL BLOCK     • EYE SURGERY  1996   • OTHER SURGICAL HISTORY      Vaginal Sling Operation for Stress Incontinence   • TUBAL ABDOMINAL LIGATION     • WRIST SURGERY       Family History:   Family History   Problem Relation Age of Onset   •  "Breast cancer Sister    • Bipolar disorder Brother    • Diabetes Brother         type 2 DM   • Diabetes Brother    • Alcohol abuse Father         D.    • Cancer Sister         Breast ca    • Diabetes Maternal Grandfather         DX in his 80's   • Early death Brother          in sleep at    • Hearing loss Mother         L ear   • Miscarriages / Stillbirths Mother         child number 4   • Vision loss Mother         cataracts replaced     Social History:   Social History     Socioeconomic History   • Marital status:      Spouse name: Not on file   • Number of children: Not on file   • Years of education: Not on file   • Highest education level: Not on file   Tobacco Use   • Smoking status: Never Smoker   • Smokeless tobacco: Never Used   • Tobacco comment: none   Substance and Sexual Activity   • Alcohol use: Yes     Types: 2 Glasses of wine, 2 Standard drinks or equivalent per week   • Drug use: No   • Sexual activity: Yes     Partners: Male     Birth control/protection: Post-menopausal, Surgical     Comment: tubal ligation at 29yo., natural menopause at 38       Vital Signs Range for the last 24 hours  Temperature: Temp:  [36.6 °C (97.8 °F)] 36.6 °C (97.8 °F)   Temp Source: Temp src: Infrared   BP: BP: (138)/(98) 138/98   Pulse: Heart Rate:  [100] 100   Respirations: Resp:  [16] 16   SPO2: SpO2:  [97 %] 97 %   O2 Amount (l/min):     O2 Devices Device (Oxygen Therapy): room air   Weight: Weight:  [90.3 kg (199 lb)] 90.3 kg (199 lb)     Flowsheet Rows      First Filed Value   Admission Height  175.3 cm (69\") Documented at 2021 113   Admission Weight  90.3 kg (199 lb) Documented at 2021 1138          --------------------------------------------------------------------------------    Current Outpatient Medications   Medication Sig Dispense Refill   • amphetamine-dextroamphetamine XR (Adderall XR) 10 MG 24 hr capsule Take 1 capsule by mouth Every Morning 30 capsule 0   • " atorvastatin (LIPITOR) 20 MG tablet Take 1 tablet by mouth Daily. 90 tablet 3   • bimatoprost (LUMIGAN) 0.03 % ophthalmic drops Instill 1 drop into each eye every night at bedtime 2.5 mL 3   • budesonide-formoterol (SYMBICORT) 80-4.5 MCG/ACT inhaler Inhale 2 puffs by mouth twice a day 10.2 g 10   • Calcium Carbonate-Vitamin D (CALCIUM + D PO) Take by mouth.     • DYMISTA 137-50 MCG/ACT suspension      • estradiol (VAGIFEM) 10 MCG tablet vaginal tablet Insert 1 tablet into the vagina 2 (Two) Times a Week. 24 tablet 3   • HYDROcodone-acetaminophen (NORCO)  MG per tablet Take 1 tablet by mouth Every 6 (Six) Hours As Needed for Moderate Pain . 50 tablet 0   • norethindrone-ethinyl estradiol (Jinteli) 1-5 MG-MCG tablet Take 1 tablet by mouth Daily. 90 tablet 3   • tiZANidine (ZANAFLEX) 4 MG tablet Take 1 tablet by mouth Every 6 (Six) Hours As Needed for Muscle Spasms. 30 tablet 5   • amphetamine-dextroamphetamine XR (Adderall XR) 30 MG 24 hr capsule Take 1 capsule by mouth Every Morning 30 capsule 0     No current facility-administered medications for this encounter.       --------------------------------------------------------------------------------  Assessment/Plan      Anesthesia Evaluation     Patient summary reviewed and Nursing notes reviewed   NPO Solid Status: > 8 hours  NPO Liquid Status: > 2 hours    Pain impairs ability to perform ADLs: Sleeping  Modalities previously tried to control pain with limited effectiveness within the last 4-6 weeks: Rest     Airway   Mallampati: II  TM distance: >3 FB  Neck ROM: full  Dental - normal exam     Pulmonary - negative pulmonary ROS and normal exam   Cardiovascular - normal exam    (+) hyperlipidemia,       Neuro/Psych- neuro exam normal  (+) headaches, numbness, psychiatric history,     GI/Hepatic/Renal/Endo    (+) obesity,       Musculoskeletal (-) normal exam    (+) back pain, neck pain, radiculopathy  Abdominal  - normal exam   Substance History - negative  use     OB/GYN negative ob/gyn ROS         Other                   Diagnosis and Plan    Treatment Plan  ASA 2      Procedures: Lumbar Epidural Steroid Injection(LESI), With fluoroscopy,       Anesthetic plan and risks discussed with patient.          Diagnosis     * Lumbar degenerative disc disease [M51.36]     * Lumbar spinal stenosis [M48.061]     * Lumbar radiculopathy [M54.16]

## 2021-04-27 NOTE — ANESTHESIA PROCEDURE NOTES
PAIN Epidural block      Patient reassessed immediately prior to procedure    Patient location during procedure: pain clinic  Indication:procedure for pain  Performed By  Anesthesiologist: Dawson Li MD  Preanesthetic Checklist  Completed: patient identified, site marked, risks and benefits discussed, surgical consent, monitors and equipment checked, pre-op evaluation and timeout performed  Additional Notes  Depomedrol - 80mg    Needle position confirmed by fluoroscopy. No contrast used due to allergy..    Diagnosis  Post-Op Diagnosis Codes:     * Lumbar degenerative disc disease (M51.36)     * Lumbar spinal stenosis (M48.061)     * Lumbar radiculopathy (M54.16)    Prep:  Pt Position:prone  Sterile Tech:cap, gloves, mask and sterile barrier  Prep:chlorhexidine gluconate and isopropyl alcohol  Monitoring:blood pressure monitoring, continuous pulse oximetry and EKG  Procedure:Sedation: no     Approach:left paramedian  Guidance: fluoroscopy  Location:lumbar  Level:4-5  Needle Type:Tuohy  Needle Gauge:20  Aspiration:negative  Medications:  Depomedrol:80 mg  Preservative Free Saline:2mL  Isovue:0mL    Post Assessment:  Post-procedure: bandaide.  Pt Tolerance:patient tolerated the procedure well with no apparent complications  Complications:no

## 2021-04-28 ENCOUNTER — APPOINTMENT (OUTPATIENT)
Dept: PHYSICAL THERAPY | Facility: HOSPITAL | Age: 61
End: 2021-04-28

## 2021-04-29 ENCOUNTER — APPOINTMENT (OUTPATIENT)
Dept: PHYSICAL THERAPY | Facility: HOSPITAL | Age: 61
End: 2021-04-29

## 2021-05-03 ENCOUNTER — APPOINTMENT (OUTPATIENT)
Dept: PHYSICAL THERAPY | Facility: HOSPITAL | Age: 61
End: 2021-05-03

## 2021-05-06 ENCOUNTER — APPOINTMENT (OUTPATIENT)
Dept: PHYSICAL THERAPY | Facility: HOSPITAL | Age: 61
End: 2021-05-06

## 2021-05-10 DIAGNOSIS — F90.0 ATTENTION DEFICIT HYPERACTIVITY DISORDER (ADHD), PREDOMINANTLY INATTENTIVE TYPE: ICD-10-CM

## 2021-05-10 RX ORDER — DEXTROAMPHETAMINE SACCHARATE, AMPHETAMINE ASPARTATE MONOHYDRATE, DEXTROAMPHETAMINE SULFATE AND AMPHETAMINE SULFATE 7.5; 7.5; 7.5; 7.5 MG/1; MG/1; MG/1; MG/1
30 CAPSULE, EXTENDED RELEASE ORAL EVERY MORNING
Qty: 30 CAPSULE | Refills: 0 | Status: CANCELLED | OUTPATIENT
Start: 2021-05-10

## 2021-05-10 RX ORDER — DEXTROAMPHETAMINE SACCHARATE, AMPHETAMINE ASPARTATE MONOHYDRATE, DEXTROAMPHETAMINE SULFATE AND AMPHETAMINE SULFATE 2.5; 2.5; 2.5; 2.5 MG/1; MG/1; MG/1; MG/1
10 CAPSULE, EXTENDED RELEASE ORAL EVERY MORNING
Qty: 30 CAPSULE | Refills: 0 | Status: SHIPPED | OUTPATIENT
Start: 2021-05-10 | End: 2021-06-10 | Stop reason: SDUPTHER

## 2021-05-10 RX ORDER — DEXTROAMPHETAMINE SACCHARATE, AMPHETAMINE ASPARTATE MONOHYDRATE, DEXTROAMPHETAMINE SULFATE AND AMPHETAMINE SULFATE 7.5; 7.5; 7.5; 7.5 MG/1; MG/1; MG/1; MG/1
30 CAPSULE, EXTENDED RELEASE ORAL EVERY MORNING
Qty: 30 CAPSULE | Refills: 0 | Status: SHIPPED | OUTPATIENT
Start: 2021-05-10 | End: 2021-06-10 | Stop reason: SDUPTHER

## 2021-05-10 RX ORDER — DEXTROAMPHETAMINE SACCHARATE, AMPHETAMINE ASPARTATE MONOHYDRATE, DEXTROAMPHETAMINE SULFATE AND AMPHETAMINE SULFATE 2.5; 2.5; 2.5; 2.5 MG/1; MG/1; MG/1; MG/1
10 CAPSULE, EXTENDED RELEASE ORAL EVERY MORNING
Qty: 30 CAPSULE | Refills: 0 | Status: CANCELLED | OUTPATIENT
Start: 2021-05-10

## 2021-05-25 DIAGNOSIS — M25.511 RIGHT SHOULDER PAIN, UNSPECIFIED CHRONICITY: Primary | ICD-10-CM

## 2021-06-03 ENCOUNTER — TELEPHONE (OUTPATIENT)
Dept: INTERNAL MEDICINE | Facility: CLINIC | Age: 61
End: 2021-06-03

## 2021-06-03 NOTE — TELEPHONE ENCOUNTER
Caller: Angely Mathias    Relationship: Self    Best call back number: [phone]     What form or medical record are you requesting: X-RAY IMAGES OF RIGHT SHOULDER    Who is requesting this form or medical record from you: ORTHOPEDIC SURGEONMELANIE    How would you like to receive the form or medical records (pick-up, mail, fax): FAX [phone] OR PICK-UP IF POSSIBLE (THE ORTHOPEDIC SURGEON'S OFFICE IS REQUESTING A CD, BUT THE PATIENT WOULD LIKE TO TRY TO FAX RESULTS OVER TO MAKE IT EASIER)    Timeframe paperwork needed: ASAP, THE PATIENT'S APPOINTMENT IS TOMORROW, 06/03/2021    Additional notes: PLEASE ADVISE THE PATIENT AT THE NUMBER ABOVE.

## 2021-06-08 ENCOUNTER — HOSPITAL ENCOUNTER (OUTPATIENT)
Dept: GENERAL RADIOLOGY | Facility: HOSPITAL | Age: 61
Discharge: HOME | End: 2021-06-08

## 2021-06-08 ENCOUNTER — ANESTHESIA EVENT (OUTPATIENT)
Dept: PAIN MEDICINE | Facility: HOSPITAL | Age: 61
End: 2021-06-08

## 2021-06-08 ENCOUNTER — HOSPITAL ENCOUNTER (OUTPATIENT)
Dept: PAIN MEDICINE | Facility: HOSPITAL | Age: 61
Discharge: HOME | End: 2021-06-08

## 2021-06-08 ENCOUNTER — ANESTHESIA (OUTPATIENT)
Dept: PAIN MEDICINE | Facility: HOSPITAL | Age: 61
End: 2021-06-08

## 2021-06-08 VITALS
SYSTOLIC BLOOD PRESSURE: 139 MMHG | OXYGEN SATURATION: 100 % | DIASTOLIC BLOOD PRESSURE: 69 MMHG | HEIGHT: 69 IN | HEART RATE: 81 BPM | RESPIRATION RATE: 14 BRPM | TEMPERATURE: 97.1 F | BODY MASS INDEX: 29.47 KG/M2 | WEIGHT: 199 LBS

## 2021-06-08 DIAGNOSIS — R52 PAIN: ICD-10-CM

## 2021-06-08 DIAGNOSIS — M54.16 LUMBAR RADICULOPATHY: Primary | ICD-10-CM

## 2021-06-08 PROCEDURE — 25010000002 METHYLPREDNISOLONE PER 80 MG: Performed by: ANESTHESIOLOGY

## 2021-06-08 PROCEDURE — 77003 FLUOROGUIDE FOR SPINE INJECT: CPT

## 2021-06-08 RX ORDER — MIDAZOLAM HYDROCHLORIDE 1 MG/ML
1 INJECTION INTRAMUSCULAR; INTRAVENOUS
Status: DISCONTINUED | OUTPATIENT
Start: 2021-06-08 | End: 2021-06-09 | Stop reason: HOSPADM

## 2021-06-08 RX ORDER — LIDOCAINE HYDROCHLORIDE 10 MG/ML
1 INJECTION, SOLUTION INFILTRATION; PERINEURAL ONCE
Status: DISCONTINUED | OUTPATIENT
Start: 2021-06-08 | End: 2021-06-09 | Stop reason: HOSPADM

## 2021-06-08 RX ORDER — METHYLPREDNISOLONE ACETATE 80 MG/ML
80 INJECTION, SUSPENSION INTRA-ARTICULAR; INTRALESIONAL; INTRAMUSCULAR; SOFT TISSUE ONCE
Status: COMPLETED | OUTPATIENT
Start: 2021-06-08 | End: 2021-06-08

## 2021-06-08 RX ORDER — FENTANYL CITRATE 50 UG/ML
50 INJECTION, SOLUTION INTRAMUSCULAR; INTRAVENOUS AS NEEDED
Status: DISCONTINUED | OUTPATIENT
Start: 2021-06-08 | End: 2021-06-09 | Stop reason: HOSPADM

## 2021-06-08 RX ADMIN — METHYLPREDNISOLONE ACETATE 80 MG: 80 INJECTION, SUSPENSION INTRA-ARTICULAR; INTRALESIONAL; INTRAMUSCULAR; SOFT TISSUE at 10:53

## 2021-06-08 NOTE — ANESTHESIA PROCEDURE NOTES
PAIN Epidural block      Patient reassessed immediately prior to procedure    Patient location during procedure: pain clinic  Indication:procedure for pain  Performed By  Anesthesiologist: Dawson Li MD  Preanesthetic Checklist  Completed: patient identified, site marked, risks and benefits discussed, surgical consent, monitors and equipment checked, pre-op evaluation and timeout performed  Additional Notes  Depomedrol - 80mg    Needle position confirmed by fluoroscopy. No contrast due to allergy.    Diagnosis  Post-Op Diagnosis Codes:     * Lumbar degenerative disc disease (M51.36)     * Lumbar spinal stenosis (M48.061)     * Lumbar radiculopathy (M54.16)    Prep:  Pt Position:prone  Sterile Tech:cap, gloves, mask and sterile barrier  Prep:chlorhexidine gluconate and isopropyl alcohol  Monitoring:blood pressure monitoring, continuous pulse oximetry and EKG  Procedure:Sedation: no     Approach:left paramedian  Guidance: fluoroscopy  Location:lumbar  Level:4-5  Needle Type:Tuohy  Needle Gauge:20  Aspiration:negative  Medications:  Depomedrol:80 mg  Preservative Free Saline:2mL  Isovue:0mL    Post Assessment:  Post-procedure: bandaide.  Pt Tolerance:patient tolerated the procedure well with no apparent complications  Complications:no

## 2021-06-08 NOTE — H&P
[facility]    History and Physical    Patient Name: Angely Mathias  :  1960  MRN:  [id number]  Date of Admission: 2021    Subjective     Patient is a 61 y.o. female presents with chief complaint of chronic, intermitent, moderate low back and leg: bilateral pain.  Onset of symptoms was gradual starting several months ago.  Symptoms are associated/aggravated by activity, exercise, lifting, standing or twisting. Symptoms improve with rest    The following portions of the patients history were reviewed and updated as appropriate: current medications, allergies, past medical history, past surgical history, past family history, past social history and problem list                Objective     Past Medical History:   Past Medical History:   Diagnosis Date   • Acute bronchitis    • Acute pain    • ADHD (attention deficit hyperactivity disorder)     years ago. worse w/ job type change   • Allergic     since i was little   • Blood tests for routine general physical examination    • Cholelithiasis     resolved with GB removal   • Diarrhea    • Glaucoma     treated w/ eye drops   • Headache     ocular and allergy induced   • Hyperlipidemia     noted on past lab work   • Low back pain     occasional chronic   • Microscopic hematuria    • Obesity     at times   • Visual impairment 1965    wear contacts/glasses     Past Surgical History:   Past Surgical History:   Procedure Laterality Date   • ABDOMINOPLASTY     • BREAST SURGERY      Enlargement Procedure   • CHOLECYSTECTOMY     • COLONOSCOPY     • COSMETIC SURGERY      breast aug 1989 and    • DIAGNOSTIC LAPAROSCOPY EXPLORATORY LAPAROTOMY     • DILATATION AND CURETTAGE     • EPIDURAL BLOCK     • EYE SURGERY  1996   • OTHER SURGICAL HISTORY      Vaginal Sling Operation for Stress Incontinence   • TUBAL ABDOMINAL LIGATION     • WRIST SURGERY       Family History:   Family History   Problem Relation Age of Onset   •  "Breast cancer Sister    • Bipolar disorder Brother    • Diabetes Brother         type 2 DM   • Diabetes Brother    • Alcohol abuse Father         D.    • Cancer Sister         Breast ca    • Diabetes Maternal Grandfather         DX in his 80's   • Early death Brother          in sleep at    • Hearing loss Mother         L ear   • Miscarriages / Stillbirths Mother         child number 4   • Vision loss Mother         cataracts replaced     Social History:   Social History     Socioeconomic History   • Marital status:      Spouse name: Not on file   • Number of children: Not on file   • Years of education: Not on file   • Highest education level: Not on file   Tobacco Use   • Smoking status: Never Smoker   • Smokeless tobacco: Never Used   • Tobacco comment: none   Substance and Sexual Activity   • Alcohol use: Yes     Types: 2 Glasses of wine, 2 Standard drinks or equivalent per week   • Drug use: No   • Sexual activity: Yes     Partners: Male     Birth control/protection: Post-menopausal, Surgical     Comment: tubal ligation at 27yo., natural menopause at 38       Vital Signs Range for the last 24 hours  Temperature:     Temp Source:     BP:     Pulse:     Respirations:     SPO2:     O2 Amount (l/min):     O2 Devices     Weight:       Flowsheet Rows      First Filed Value   Admission Height  175.3 cm (69\") Documented at 2021 1138   Admission Weight  90.3 kg (199 lb) Documented at 2021 1138          --------------------------------------------------------------------------------    Current Outpatient Medications   Medication Sig Dispense Refill   • amphetamine-dextroamphetamine XR (Adderall XR) 10 MG 24 hr capsule Take 1 capsule by mouth Every Morning 30 capsule 0   • amphetamine-dextroamphetamine XR (Adderall XR) 30 MG 24 hr capsule Take 1 capsule by mouth Every Morning 30 capsule 0   • atorvastatin (LIPITOR) 20 MG tablet Take 1 tablet by mouth Daily. 90 tablet 3   • " Azelastine-Fluticasone 137-50 MCG/ACT suspension Spray 1 spray in each nostril twice daily 23 g 11   • bimatoprost (LUMIGAN) 0.03 % ophthalmic drops Instill 1 drop into each eye every night at bedtime 2.5 mL 3   • brimonidine (ALPHAGAN) 0.2 % ophthalmic solution instill 1 drop into both eyes twice daily 5 mL 4   • budesonide-formoterol (SYMBICORT) 80-4.5 MCG/ACT inhaler Inhale 2 puffs by mouth twice a day 10.2 g 10   • Calcium Carbonate-Vitamin D (CALCIUM + D PO) Take by mouth.     • diclofenac (VOLTAREN) 75 MG EC tablet Take 1 tablet by mouth 2 (two) times a day with food 60 tablet 3   • DYMISTA 137-50 MCG/ACT suspension      • estradiol (VAGIFEM) 10 MCG tablet vaginal tablet Insert 1 tablet into the vagina 2 (Two) Times a Week. 24 tablet 3   • HYDROcodone-acetaminophen (NORCO)  MG per tablet Take 1 tablet by mouth Every 6 (Six) Hours As Needed for Moderate Pain . 50 tablet 0   • norethindrone-ethinyl estradiol (Jinteli) 1-5 MG-MCG tablet Take 1 tablet by mouth Daily. 90 tablet 3   • tiZANidine (ZANAFLEX) 4 MG tablet Take 1 tablet by mouth Every 6 (Six) Hours As Needed for Muscle Spasms. 30 tablet 5     No current facility-administered medications for this encounter.       --------------------------------------------------------------------------------  Assessment/Plan      Anesthesia Evaluation     Patient summary reviewed and Nursing notes reviewed   NPO Solid Status: > 8 hours  NPO Liquid Status: > 2 hours    Pain impairs ability to perform ADLs: Sleeping  Modalities previously tried to control pain with limited effectiveness within the last 4-6 weeks: Rest     Airway   Mallampati: II  TM distance: >3 FB  Neck ROM: full  Dental - normal exam     Pulmonary - negative pulmonary ROS and normal exam   Cardiovascular - normal exam    (+) hyperlipidemia,       Neuro/Psych- neuro exam normal  (+) headaches, numbness, psychiatric history,     GI/Hepatic/Renal/Endo    (+) obesity,       Musculoskeletal (-) normal  exam    (+) back pain, neck pain, radiculopathy  Abdominal  - normal exam   Substance History - negative use     OB/GYN negative ob/gyn ROS         Other                   Diagnosis and Plan      Treatment Plan  ASA 2   Patient has had previous injection/procedure with 50-75% improvement.   Procedures: Lumbar Epidural Steroid Injection(LESI), With fluoroscopy,       Anesthetic plan and risks discussed with patient.          Diagnosis     * Lumbar degenerative disc disease [M51.36]     * Lumbar spinal stenosis [M48.061]     * Lumbar radiculopathy [M54.16]

## 2021-06-10 DIAGNOSIS — F90.0 ATTENTION DEFICIT HYPERACTIVITY DISORDER (ADHD), PREDOMINANTLY INATTENTIVE TYPE: ICD-10-CM

## 2021-06-10 RX ORDER — DEXTROAMPHETAMINE SACCHARATE, AMPHETAMINE ASPARTATE MONOHYDRATE, DEXTROAMPHETAMINE SULFATE AND AMPHETAMINE SULFATE 2.5; 2.5; 2.5; 2.5 MG/1; MG/1; MG/1; MG/1
10 CAPSULE, EXTENDED RELEASE ORAL EVERY MORNING
Qty: 30 CAPSULE | Refills: 0 | Status: CANCELLED | OUTPATIENT
Start: 2021-06-10

## 2021-06-10 RX ORDER — DEXTROAMPHETAMINE SACCHARATE, AMPHETAMINE ASPARTATE MONOHYDRATE, DEXTROAMPHETAMINE SULFATE AND AMPHETAMINE SULFATE 7.5; 7.5; 7.5; 7.5 MG/1; MG/1; MG/1; MG/1
30 CAPSULE, EXTENDED RELEASE ORAL EVERY MORNING
Qty: 30 CAPSULE | Refills: 0 | Status: SHIPPED | OUTPATIENT
Start: 2021-06-10 | End: 2021-07-11 | Stop reason: SDUPTHER

## 2021-06-10 RX ORDER — DEXTROAMPHETAMINE SACCHARATE, AMPHETAMINE ASPARTATE MONOHYDRATE, DEXTROAMPHETAMINE SULFATE AND AMPHETAMINE SULFATE 2.5; 2.5; 2.5; 2.5 MG/1; MG/1; MG/1; MG/1
10 CAPSULE, EXTENDED RELEASE ORAL EVERY MORNING
Qty: 30 CAPSULE | Refills: 0 | Status: SHIPPED | OUTPATIENT
Start: 2021-06-10 | End: 2021-07-11 | Stop reason: SDUPTHER

## 2021-06-10 RX ORDER — DEXTROAMPHETAMINE SACCHARATE, AMPHETAMINE ASPARTATE MONOHYDRATE, DEXTROAMPHETAMINE SULFATE AND AMPHETAMINE SULFATE 7.5; 7.5; 7.5; 7.5 MG/1; MG/1; MG/1; MG/1
30 CAPSULE, EXTENDED RELEASE ORAL EVERY MORNING
Qty: 30 CAPSULE | Refills: 0 | Status: CANCELLED | OUTPATIENT
Start: 2021-06-10

## 2021-06-21 ENCOUNTER — APPOINTMENT (OUTPATIENT)
Dept: PHYSICAL THERAPY | Facility: HOSPITAL | Age: 61
End: 2021-06-21

## 2021-07-07 ENCOUNTER — HOSPITAL ENCOUNTER (OUTPATIENT)
Dept: PHYSICAL THERAPY | Facility: HOSPITAL | Age: 61
Setting detail: THERAPIES SERIES
Discharge: HOME | End: 2021-07-07

## 2021-07-07 DIAGNOSIS — R29.3 POOR POSTURE: Primary | ICD-10-CM

## 2021-07-07 DIAGNOSIS — M25.511 CHRONIC RIGHT SHOULDER PAIN: ICD-10-CM

## 2021-07-07 DIAGNOSIS — G89.29 CHRONIC RIGHT SHOULDER PAIN: ICD-10-CM

## 2021-07-07 PROCEDURE — 97164 PT RE-EVAL EST PLAN CARE: CPT

## 2021-07-07 PROCEDURE — 97110 THERAPEUTIC EXERCISES: CPT

## 2021-07-07 NOTE — THERAPY RE-EVALUATION
Outpatient Physical Therapy Ortho Re-Evaluation  [facility]     Patient Name: Angely Mathias  : 1960  MRN: [id number]  Today's Date: 2021      Visit Date: 2021    Patient Active Problem List   Diagnosis   • Atopic rhinitis   • Hyperlipidemia   • Chronic low back pain   • Neck pain   • Persistent insomnia   • Overweight   • Lung nodule   • Attention deficit hyperactivity disorder (ADHD), predominantly inattentive type   • Chronic bronchitis (CMS/HCC)   • Other specified glaucoma   • Intervertebral disc disorder with radiculopathy of lumbar region        Past Medical History:   Diagnosis Date   • Acute bronchitis    • Acute pain    • ADHD (attention deficit hyperactivity disorder)     years ago. worse w/ job type change   • Allergic     since i was little   • Blood tests for routine general physical examination    • Cholelithiasis     resolved with GB removal   • Diarrhea    • Glaucoma     treated w/ eye drops   • Headache 1968    ocular and allergy induced   • Hyperlipidemia     noted on past lab work   • Low back pain     occasional chronic   • Microscopic hematuria    • Obesity     at times   • Visual impairment 1965    wear contacts/glasses        Past Surgical History:   Procedure Laterality Date   • ABDOMINOPLASTY     • BREAST SURGERY      Enlargement Procedure   • CHOLECYSTECTOMY     • COLONOSCOPY     • COSMETIC SURGERY      breast aug 1989 and    • DIAGNOSTIC LAPAROSCOPY EXPLORATORY LAPAROTOMY     • DILATATION AND CURETTAGE     • EPIDURAL BLOCK     • EYE SURGERY      RK    • OTHER SURGICAL HISTORY      Vaginal Sling Operation for Stress Incontinence   • TUBAL ABDOMINAL LIGATION     • WRIST SURGERY         Visit Dx:     ICD-10-CM ICD-9-CM   1. Poor posture  R29.3 781.92   2. Chronic right shoulder pain  M25.511 719.41    G89.29 338.29         Patient History     Row Name 21 1500             History    Brief Description of Current  Complaint  Pt. Had recently been seen here frr both R shoulder and LBP. Pt. has since received coritsone injection in R shoulder in early June. Overall, pt. Reports shoulder pain has improved and sh reports no pain for 3-4 weeks following injection, since then the pain has returned somewhat but is 80-85% improved. Pt. States she has returned to PT because MD wanted her to be seen by therapy since she has now received the injection. Additionally, pt. Received epidural injection for LBP and feels that is 95% better. It is not something she is concerned about this date to address formally. At this time pt. Has minimal to no complaints, would like to trial independent management for a few weeks until after return to MD, pending if MD feels she needs further therapy.  -MH         Pain     Pain Location  Shoulder  -MH      Pain at Present  4  -MH      Pain at Best  0  -MH      Pain at Worst  8  -MH        User Key  (r) = Recorded By, (t) = Taken By, (c) = Cosigned By    Initials Name Provider Type     Roxanna Bryan, PT Physical Therapist          PT Ortho     Row Name 07/07/21 1500       Left Upper Ext    Lt Shoulder Abduction AROM  0-157  -MH    Lt Shoulder Flexion AROM  0-158  -MH    Lt Shoulder External Rotation AROM  FERCHO T5  -MH    Lt Shoulder Internal Rotation AROM  FIR to roughly T7  -MH    Lt Upper Extremity Comments   in sitting  -MH       MMT Right Upper Ext    Rt Shoulder Flexion MMT, Gross Movement  (4/5) good  -MH    Rt Shoulder ABduction MMT, Gross Movement  (4+/5) good plus  -MH    Rt Shoulder Internal Rotation MMT, Gross Movement  (4-/5) good minus  -MH    Rt Shoulder External Rotation MMT, Gross Movement  (4/5) good  -MH      User Key  (r) = Recorded By, (t) = Taken By, (c) = Cosigned By    Initials Name Provider Type     Roxanna Bryan, PT Physical Therapist                      Therapy Education  Education Details: Update of progress toward goals, plan moving forward. Reviewed/re-issued HEP,  progressed several Access Code: [email or web address]  Call for additional appointment following MD appointment.  Given: Posture/body mechanics, Symptoms/condition management  Program: Reinforced  How Provided: Verbal, Demonstration  Provided to: Patient  Level of Understanding: Verbalized, Demonstrated     PT OP Goals     Row Name 07/07/21 1500          PT Short Term Goals    STG Date to Achieve  04/15/21  -     STG 1  Pt. Will be independent with initial HEP to improve self-management of condition.  -     STG 1 Progress  Met  -     STG 1 Progress Comments  reports performing when she felt the shoulder getting aggravated  -     STG 2  Pt. Will demonstrate and voice understanding of importance of posture with ADLs and with working from desk to promote optimal shoulder position and reduce pain.  -     STG 2 Progress  Ongoing  -        Long Term Goals    LTG Date to Achieve  05/02/21  -     LTG 1  Pt. Will be independent with advanced HEP to improve long-term management of condition and independence.  -     LTG 1 Progress  Ongoing  -     LTG 2  Pt. Will score </= 25 on QuickDASH  (from 38 on initial evaluation) to indicate improved perception of disability.  -     LTG 2 Progress  Ongoing  -     LTG 3  Pt. will report pain </= 2/10 with painting to return to hobbies.  -     LTG 3 Progress  Goal Revised  -     LTG 4  Pt. will improve R shoulder strength >/= 4+/5 and painfree to ease ability to complete household chores.  -     LTG 4 Progress  Ongoing  -       User Key  (r) = Recorded By, (t) = Taken By, (c) = Cosigned By    Initials Name Provider Type     Roxanna Bryan, PT Physical Therapist          PT Assessment/Plan     Row Name 07/07/21 1612          PT Assessment    Functional Limitations  Limitation in home management;Limitations in community activities;Performance in self-care ADL;Performance in work activities;Performance in sport activities;Performance in leisure activities  -      Impairments  Impaired flexibility;Impaired muscle power;Joint mobility;Muscle strength;Pain;Poor body mechanics;Posture;Range of motion  -     Assessment Comments  Ms. Mathias returns to clinic after roughly 3 months for continued R shoulder pain, though much improved from previously. Pt. was being seen in clinic for both R shoulder and LBP, however, held on further visits until return to MD. Pt. received both an epidural in lumbar spine and cortisone injection in R shoulder in early June. Back pain has improved 95% and shoulder pain has improved 80-85%. Pt. Is to return to ortho MD in coming weeks and he suggested she return to therapy following injection which is reason for. Pt. return. Due to improvement pt. Would like to hold on therapy until following next MD appointment to get his opinion. Pt. Continues to demonstrate poor body mechanics, superior migration of humeral head with shoulder flexion causing impingement, decreased strength R shoulder and reports shoulder irritation with painting. Pt. ROM WFL, pain manageable and pt. Reports performing HEP since being seen last. Pt. May benefit from continued skilled PT to address deficits and improve scapulohumeral rhythm to reduce shoulder impingement.  -     Please refer to paper survey for additional self-reported information  Yes  -MH     Rehab Potential  Good  -     Patient/caregiver participated in establishment of treatment plan and goals  Yes  -     Patient would benefit from skilled therapy intervention  Yes  -        PT Plan    PT Frequency  1x/week;2x/week  -     Predicted Duration of Therapy Intervention (PT)  4-6 visits  -     Planned CPT's?  PT RE-EVAL: 87736;PT THER PROC EA 15 MIN: 32267;PT THER ACT EA 15 MIN: 85046;PT MANUAL THERAPY EA 15 MIN: 94779;PT NEUROMUSC RE-EDUCATION EA 15 MIN: 42241;PT EVAL AQUA: 93058;PT AQUATIC THERAPY EA 15 MIN: 23206;PT HOT OR COLD PACK TREAT MCARE;PT SELF CARE/HOME MGMT/TRAIN EA 15: 55267;PT ELECTRICAL STIM  UNATTEND: ;PT ULTRASOUND EA 15 MIN: 57545;PT TRACTION CERVICAL: 08652;PT SELF CARE/MGMT/TRAIN 15 MIN: 42487  -MH     PT Plan Comments  How did MD appointment go? 2nd injection? Progress strengthening focused on scapular strength and reduce superior migration  -MH       User Key  (r) = Recorded By, (t) = Taken By, (c) = Cosigned By    Initials Name Provider Type     Rxoanna Bryan, PT Physical Therapist            OP Exercises     Row Name 07/07/21 1500             Total Minutes    33516 - PT Therapeutic Exercise Minutes  15  -MH         Exercise 1    Exercise Name 1  shoulder rolls posterior  -MH      Cueing 1  Verbal  -MH      Reps 1  15  -MH         Exercise 3    Exercise Name 3  B ER  -MH      Cueing 3  Verbal;Demo  -MH      Reps 3  10  -MH      Time 3  5  -MH      Additional Comments  RTB - standing  -MH         Exercise 4    Exercise Name 4  delt flexion isometric  -MH      Cueing 4  Verbal;Demo  -MH      Reps 4  5  -MH      Time 4  5  -MH         Exercise 5    Exercise Name 5  standing H-A  -MH      Cueing 5  Verbal  -MH      Reps 5  10  -MH         Exercise 6    Exercise Name 6  supine flexion with TB  -MH      Cueing 6  Verbal  -MH      Reps 6  10  -MH      Additional Comments  RTB  -MH         Exercise 7    Exercise Name 7  posterior capsule stretch  -MH      Cueing 7  Verbal  -MH      Reps 7  3  -MH      Time 7  20  -MH         Exercise 8    Exercise Name 8  rows  -MH      Cueing 8  Verbal;Tactile;Demo  -MH      Reps 8  10  -MH      Time 8  2-3  -MH      Additional Comments  RTB  -MH         Exercise 9    Exercise Name 9  shoulder ext  -MH      Cueing 9  Verbal  -MH      Reps 9  10  -MH      Time 9  2-3  -MH      Additional Comments  RTB  -MH        User Key  (r) = Recorded By, (t) = Taken By, (c) = Cosigned By    Initials Name Provider Type     Roxanna Bryan, PT Physical Therapist                                  Time Calculation:     Start Time: 1532  Stop Time: 1610  Time Calculation (min):  38 min  Total Timed Code Minutes- PT: 15 minute(s)  Timed Charges  22102 - PT Therapeutic Exercise Minutes: 15  Total Minutes  Timed Charges Total Minutes: 15   Total Minutes: 15     Therapy Charges for Today     Code Description Service Date Service Provider Modifiers Qty    [id number]  PT THER PROC EA 15 MIN 7/7/2021 Roxanna Bryan, PT GP 1    [id number]  PT RE-EVAL ESTABLISHED PLAN 2 7/7/2021 Roxanna Bryan, PT GP 1                    Roxanna Bryan PT  7/7/2021

## 2021-07-08 DIAGNOSIS — E78.5 HYPERLIPIDEMIA, UNSPECIFIED HYPERLIPIDEMIA TYPE: Primary | ICD-10-CM

## 2021-07-09 DIAGNOSIS — E78.5 HYPERLIPIDEMIA, UNSPECIFIED HYPERLIPIDEMIA TYPE: Primary | ICD-10-CM

## 2021-07-10 LAB
ALBUMIN SERPL-MCNC: 4.9 G/DL (ref 3.5–5.2)
ALBUMIN/GLOB SERPL: 2.1 G/DL
ALP SERPL-CCNC: 80 U/L (ref 39–117)
ALT SERPL-CCNC: 29 U/L (ref 1–33)
AST SERPL-CCNC: 27 U/L (ref 1–32)
BILIRUB SERPL-MCNC: 0.5 MG/DL (ref 0–1.2)
BUN SERPL-MCNC: 15 MG/DL (ref 8–23)
BUN/CREAT SERPL: 14.6 (ref 7–25)
CALCIUM SERPL-MCNC: 10.1 MG/DL (ref 8.6–10.5)
CHLORIDE SERPL-SCNC: 102 MMOL/L (ref 98–107)
CHOLEST SERPL-MCNC: 191 MG/DL (ref 0–200)
CO2 SERPL-SCNC: 27.8 MMOL/L (ref 22–29)
CREAT SERPL-MCNC: 1.03 MG/DL (ref 0.57–1)
GLOBULIN SER CALC-MCNC: 2.3 GM/DL
GLUCOSE SERPL-MCNC: 91 MG/DL (ref 65–99)
HDLC SERPL-MCNC: 64 MG/DL (ref 40–60)
LDLC SERPL CALC-MCNC: 112 MG/DL (ref 0–100)
MAGNESIUM SERPL-MCNC: 2.2 MG/DL (ref 1.6–2.4)
POTASSIUM SERPL-SCNC: 4.7 MMOL/L (ref 3.5–5.2)
PROT SERPL-MCNC: 7.2 G/DL (ref 6–8.5)
SODIUM SERPL-SCNC: 141 MMOL/L (ref 136–145)
TRIGL SERPL-MCNC: 84 MG/DL (ref 0–150)
VLDLC SERPL CALC-MCNC: 15 MG/DL (ref 5–40)

## 2021-07-11 DIAGNOSIS — F90.0 ATTENTION DEFICIT HYPERACTIVITY DISORDER (ADHD), PREDOMINANTLY INATTENTIVE TYPE: ICD-10-CM

## 2021-07-11 RX ORDER — DEXTROAMPHETAMINE SACCHARATE, AMPHETAMINE ASPARTATE MONOHYDRATE, DEXTROAMPHETAMINE SULFATE AND AMPHETAMINE SULFATE 2.5; 2.5; 2.5; 2.5 MG/1; MG/1; MG/1; MG/1
10 CAPSULE, EXTENDED RELEASE ORAL EVERY MORNING
Qty: 30 CAPSULE | Refills: 0 | Status: SHIPPED | OUTPATIENT
Start: 2021-07-11 | End: 2021-08-10 | Stop reason: SDUPTHER

## 2021-07-11 RX ORDER — DEXTROAMPHETAMINE SACCHARATE, AMPHETAMINE ASPARTATE MONOHYDRATE, DEXTROAMPHETAMINE SULFATE AND AMPHETAMINE SULFATE 7.5; 7.5; 7.5; 7.5 MG/1; MG/1; MG/1; MG/1
30 CAPSULE, EXTENDED RELEASE ORAL EVERY MORNING
Qty: 30 CAPSULE | Refills: 0 | Status: CANCELLED | OUTPATIENT
Start: 2021-07-11

## 2021-07-11 RX ORDER — DEXTROAMPHETAMINE SACCHARATE, AMPHETAMINE ASPARTATE MONOHYDRATE, DEXTROAMPHETAMINE SULFATE AND AMPHETAMINE SULFATE 2.5; 2.5; 2.5; 2.5 MG/1; MG/1; MG/1; MG/1
10 CAPSULE, EXTENDED RELEASE ORAL EVERY MORNING
Qty: 30 CAPSULE | Refills: 0 | Status: CANCELLED | OUTPATIENT
Start: 2021-07-11

## 2021-07-11 RX ORDER — DEXTROAMPHETAMINE SACCHARATE, AMPHETAMINE ASPARTATE MONOHYDRATE, DEXTROAMPHETAMINE SULFATE AND AMPHETAMINE SULFATE 7.5; 7.5; 7.5; 7.5 MG/1; MG/1; MG/1; MG/1
30 CAPSULE, EXTENDED RELEASE ORAL EVERY MORNING
Qty: 30 CAPSULE | Refills: 0 | Status: SHIPPED | OUTPATIENT
Start: 2021-07-11 | End: 2021-08-10 | Stop reason: SDUPTHER

## 2021-08-10 DIAGNOSIS — F90.0 ATTENTION DEFICIT HYPERACTIVITY DISORDER (ADHD), PREDOMINANTLY INATTENTIVE TYPE: ICD-10-CM

## 2021-08-10 DIAGNOSIS — M54.50 LOW BACK PAIN, UNSPECIFIED BACK PAIN LATERALITY, UNSPECIFIED CHRONICITY, UNSPECIFIED WHETHER SCIATICA PRESENT: ICD-10-CM

## 2021-08-11 RX ORDER — HYDROCODONE BITARTRATE AND ACETAMINOPHEN 10; 325 MG/1; MG/1
1 TABLET ORAL EVERY 6 HOURS PRN
Qty: 50 TABLET | Refills: 0 | Status: SHIPPED | OUTPATIENT
Start: 2021-08-11 | End: 2021-12-19 | Stop reason: SDUPTHER

## 2021-08-11 RX ORDER — DEXTROAMPHETAMINE SACCHARATE, AMPHETAMINE ASPARTATE MONOHYDRATE, DEXTROAMPHETAMINE SULFATE AND AMPHETAMINE SULFATE 2.5; 2.5; 2.5; 2.5 MG/1; MG/1; MG/1; MG/1
10 CAPSULE, EXTENDED RELEASE ORAL EVERY MORNING
Qty: 30 CAPSULE | Refills: 0 | Status: SHIPPED | OUTPATIENT
Start: 2021-08-11 | End: 2021-09-13 | Stop reason: SDUPTHER

## 2021-08-11 RX ORDER — DEXTROAMPHETAMINE SACCHARATE, AMPHETAMINE ASPARTATE MONOHYDRATE, DEXTROAMPHETAMINE SULFATE AND AMPHETAMINE SULFATE 7.5; 7.5; 7.5; 7.5 MG/1; MG/1; MG/1; MG/1
30 CAPSULE, EXTENDED RELEASE ORAL EVERY MORNING
Qty: 30 CAPSULE | Refills: 0 | Status: SHIPPED | OUTPATIENT
Start: 2021-08-11 | End: 2021-09-13 | Stop reason: SDUPTHER

## 2021-08-25 ENCOUNTER — APPOINTMENT (OUTPATIENT)
Dept: PAIN MEDICINE | Facility: HOSPITAL | Age: 61
End: 2021-08-25

## 2021-09-13 DIAGNOSIS — F90.0 ATTENTION DEFICIT HYPERACTIVITY DISORDER (ADHD), PREDOMINANTLY INATTENTIVE TYPE: ICD-10-CM

## 2021-09-14 RX ORDER — DEXTROAMPHETAMINE SACCHARATE, AMPHETAMINE ASPARTATE MONOHYDRATE, DEXTROAMPHETAMINE SULFATE AND AMPHETAMINE SULFATE 7.5; 7.5; 7.5; 7.5 MG/1; MG/1; MG/1; MG/1
30 CAPSULE, EXTENDED RELEASE ORAL EVERY MORNING
Qty: 30 CAPSULE | Refills: 0 | Status: SHIPPED | OUTPATIENT
Start: 2021-09-14 | End: 2021-10-17 | Stop reason: SDUPTHER

## 2021-09-14 RX ORDER — DEXTROAMPHETAMINE SACCHARATE, AMPHETAMINE ASPARTATE MONOHYDRATE, DEXTROAMPHETAMINE SULFATE AND AMPHETAMINE SULFATE 2.5; 2.5; 2.5; 2.5 MG/1; MG/1; MG/1; MG/1
10 CAPSULE, EXTENDED RELEASE ORAL EVERY MORNING
Qty: 30 CAPSULE | Refills: 0 | Status: SHIPPED | OUTPATIENT
Start: 2021-09-14 | End: 2021-10-17 | Stop reason: SDUPTHER

## 2021-09-15 RX ORDER — ATORVASTATIN CALCIUM 20 MG/1
20 TABLET, FILM COATED ORAL DAILY
Qty: 90 TABLET | Refills: 3 | Status: SHIPPED | OUTPATIENT
Start: 2021-09-15 | End: 2022-09-28 | Stop reason: SDUPTHER

## 2021-10-17 DIAGNOSIS — F90.0 ATTENTION DEFICIT HYPERACTIVITY DISORDER (ADHD), PREDOMINANTLY INATTENTIVE TYPE: ICD-10-CM

## 2021-10-18 RX ORDER — DEXTROAMPHETAMINE SACCHARATE, AMPHETAMINE ASPARTATE MONOHYDRATE, DEXTROAMPHETAMINE SULFATE AND AMPHETAMINE SULFATE 7.5; 7.5; 7.5; 7.5 MG/1; MG/1; MG/1; MG/1
30 CAPSULE, EXTENDED RELEASE ORAL EVERY MORNING
Qty: 30 CAPSULE | Refills: 0 | Status: SHIPPED | OUTPATIENT
Start: 2021-10-18 | End: 2021-11-15 | Stop reason: SDUPTHER

## 2021-10-18 RX ORDER — DEXTROAMPHETAMINE SACCHARATE, AMPHETAMINE ASPARTATE MONOHYDRATE, DEXTROAMPHETAMINE SULFATE AND AMPHETAMINE SULFATE 2.5; 2.5; 2.5; 2.5 MG/1; MG/1; MG/1; MG/1
10 CAPSULE, EXTENDED RELEASE ORAL EVERY MORNING
Qty: 30 CAPSULE | Refills: 0 | Status: SHIPPED | OUTPATIENT
Start: 2021-10-18 | End: 2021-11-15 | Stop reason: SDUPTHER

## 2021-11-01 RX ORDER — TIZANIDINE 4 MG/1
4 TABLET ORAL EVERY 6 HOURS PRN
Qty: 30 TABLET | Refills: 5 | Status: SHIPPED | OUTPATIENT
Start: 2021-11-01 | End: 2022-04-11 | Stop reason: SDUPTHER

## 2021-11-15 DIAGNOSIS — F90.0 ATTENTION DEFICIT HYPERACTIVITY DISORDER (ADHD), PREDOMINANTLY INATTENTIVE TYPE: ICD-10-CM

## 2021-11-16 RX ORDER — DEXTROAMPHETAMINE SACCHARATE, AMPHETAMINE ASPARTATE MONOHYDRATE, DEXTROAMPHETAMINE SULFATE AND AMPHETAMINE SULFATE 7.5; 7.5; 7.5; 7.5 MG/1; MG/1; MG/1; MG/1
30 CAPSULE, EXTENDED RELEASE ORAL EVERY MORNING
Qty: 30 CAPSULE | Refills: 0 | Status: SHIPPED | OUTPATIENT
Start: 2021-11-16 | End: 2021-11-18 | Stop reason: SDUPTHER

## 2021-11-16 RX ORDER — DEXTROAMPHETAMINE SACCHARATE, AMPHETAMINE ASPARTATE MONOHYDRATE, DEXTROAMPHETAMINE SULFATE AND AMPHETAMINE SULFATE 2.5; 2.5; 2.5; 2.5 MG/1; MG/1; MG/1; MG/1
10 CAPSULE, EXTENDED RELEASE ORAL EVERY MORNING
Qty: 30 CAPSULE | Refills: 0 | Status: SHIPPED | OUTPATIENT
Start: 2021-11-16 | End: 2021-11-18 | Stop reason: SDUPTHER

## 2021-11-18 DIAGNOSIS — F90.0 ATTENTION DEFICIT HYPERACTIVITY DISORDER (ADHD), PREDOMINANTLY INATTENTIVE TYPE: ICD-10-CM

## 2021-11-18 RX ORDER — DEXTROAMPHETAMINE SACCHARATE, AMPHETAMINE ASPARTATE MONOHYDRATE, DEXTROAMPHETAMINE SULFATE AND AMPHETAMINE SULFATE 2.5; 2.5; 2.5; 2.5 MG/1; MG/1; MG/1; MG/1
10 CAPSULE, EXTENDED RELEASE ORAL EVERY MORNING
Qty: 30 CAPSULE | Refills: 0 | Status: SHIPPED | OUTPATIENT
Start: 2021-11-18 | End: 2022-01-22 | Stop reason: SDUPTHER

## 2021-11-18 RX ORDER — DEXTROAMPHETAMINE SACCHARATE, AMPHETAMINE ASPARTATE MONOHYDRATE, DEXTROAMPHETAMINE SULFATE AND AMPHETAMINE SULFATE 7.5; 7.5; 7.5; 7.5 MG/1; MG/1; MG/1; MG/1
30 CAPSULE, EXTENDED RELEASE ORAL EVERY MORNING
Qty: 30 CAPSULE | Refills: 0 | Status: SHIPPED | OUTPATIENT
Start: 2021-11-18 | End: 2022-01-22 | Stop reason: SDUPTHER

## 2021-12-03 ENCOUNTER — APPOINTMENT (OUTPATIENT)
Dept: WOMENS IMAGING | Facility: HOSPITAL | Age: 61
End: 2021-12-03

## 2021-12-03 PROCEDURE — 77063 BREAST TOMOSYNTHESIS BI: CPT | Performed by: RADIOLOGY

## 2021-12-03 PROCEDURE — 77067 SCR MAMMO BI INCL CAD: CPT | Performed by: RADIOLOGY

## 2021-12-19 DIAGNOSIS — M54.50 LOW BACK PAIN, UNSPECIFIED BACK PAIN LATERALITY, UNSPECIFIED CHRONICITY, UNSPECIFIED WHETHER SCIATICA PRESENT: ICD-10-CM

## 2021-12-19 RX ORDER — HYDROCODONE BITARTRATE AND ACETAMINOPHEN 10; 325 MG/1; MG/1
1 TABLET ORAL EVERY 6 HOURS PRN
Qty: 50 TABLET | Refills: 0 | Status: CANCELLED | OUTPATIENT
Start: 2021-12-19

## 2021-12-20 DIAGNOSIS — M54.50 LOW BACK PAIN, UNSPECIFIED BACK PAIN LATERALITY, UNSPECIFIED CHRONICITY, UNSPECIFIED WHETHER SCIATICA PRESENT: ICD-10-CM

## 2021-12-20 RX ORDER — HYDROCODONE BITARTRATE AND ACETAMINOPHEN 10; 325 MG/1; MG/1
1 TABLET ORAL EVERY 6 HOURS PRN
Qty: 50 TABLET | Refills: 0 | Status: SHIPPED | OUTPATIENT
Start: 2021-12-20 | End: 2022-04-11 | Stop reason: SDUPTHER

## 2021-12-26 RX ORDER — NORETHINDRONE ACETATE AND ETHINYL ESTRADIOL 1; 5 MG/1; UG/1
1 TABLET ORAL DAILY
Qty: 90 TABLET | Refills: 3 | Status: CANCELLED | OUTPATIENT
Start: 2021-12-26

## 2021-12-27 RX ORDER — NORETHINDRONE ACETATE AND ETHINYL ESTRADIOL 1; 5 MG/1; UG/1
1 TABLET ORAL DAILY
Qty: 90 TABLET | Refills: 3 | Status: SHIPPED | OUTPATIENT
Start: 2021-12-27 | End: 2021-12-30

## 2022-01-14 RX ORDER — SCOLOPAMINE TRANSDERMAL SYSTEM 1 MG/1
1 PATCH, EXTENDED RELEASE TRANSDERMAL
Qty: 4 PATCH | Refills: 0 | Status: SHIPPED | OUTPATIENT
Start: 2022-01-14 | End: 2022-04-01

## 2022-01-22 DIAGNOSIS — F90.0 ATTENTION DEFICIT HYPERACTIVITY DISORDER (ADHD), PREDOMINANTLY INATTENTIVE TYPE: ICD-10-CM

## 2022-01-24 RX ORDER — DEXTROAMPHETAMINE SACCHARATE, AMPHETAMINE ASPARTATE MONOHYDRATE, DEXTROAMPHETAMINE SULFATE AND AMPHETAMINE SULFATE 2.5; 2.5; 2.5; 2.5 MG/1; MG/1; MG/1; MG/1
10 CAPSULE, EXTENDED RELEASE ORAL EVERY MORNING
Qty: 30 CAPSULE | Refills: 0 | Status: SHIPPED | OUTPATIENT
Start: 2022-01-24 | End: 2022-02-22 | Stop reason: SDUPTHER

## 2022-01-24 RX ORDER — DEXTROAMPHETAMINE SACCHARATE, AMPHETAMINE ASPARTATE MONOHYDRATE, DEXTROAMPHETAMINE SULFATE AND AMPHETAMINE SULFATE 7.5; 7.5; 7.5; 7.5 MG/1; MG/1; MG/1; MG/1
30 CAPSULE, EXTENDED RELEASE ORAL EVERY MORNING
Qty: 30 CAPSULE | Refills: 0 | Status: SHIPPED | OUTPATIENT
Start: 2022-01-24 | End: 2022-02-22 | Stop reason: SDUPTHER

## 2022-02-10 DIAGNOSIS — R05.9 COUGH: Primary | ICD-10-CM

## 2022-02-22 DIAGNOSIS — F90.0 ATTENTION DEFICIT HYPERACTIVITY DISORDER (ADHD), PREDOMINANTLY INATTENTIVE TYPE: ICD-10-CM

## 2022-02-22 RX ORDER — DEXTROAMPHETAMINE SACCHARATE, AMPHETAMINE ASPARTATE MONOHYDRATE, DEXTROAMPHETAMINE SULFATE AND AMPHETAMINE SULFATE 2.5; 2.5; 2.5; 2.5 MG/1; MG/1; MG/1; MG/1
10 CAPSULE, EXTENDED RELEASE ORAL EVERY MORNING
Qty: 30 CAPSULE | Refills: 0 | Status: SHIPPED | OUTPATIENT
Start: 2022-02-22 | End: 2022-03-21 | Stop reason: SDUPTHER

## 2022-02-22 RX ORDER — DEXTROAMPHETAMINE SACCHARATE, AMPHETAMINE ASPARTATE MONOHYDRATE, DEXTROAMPHETAMINE SULFATE AND AMPHETAMINE SULFATE 7.5; 7.5; 7.5; 7.5 MG/1; MG/1; MG/1; MG/1
30 CAPSULE, EXTENDED RELEASE ORAL EVERY MORNING
Qty: 30 CAPSULE | Refills: 0 | Status: SHIPPED | OUTPATIENT
Start: 2022-02-22 | End: 2022-03-21 | Stop reason: SDUPTHER

## 2022-03-21 DIAGNOSIS — F90.0 ATTENTION DEFICIT HYPERACTIVITY DISORDER (ADHD), PREDOMINANTLY INATTENTIVE TYPE: ICD-10-CM

## 2022-03-21 RX ORDER — DEXTROAMPHETAMINE SACCHARATE, AMPHETAMINE ASPARTATE MONOHYDRATE, DEXTROAMPHETAMINE SULFATE AND AMPHETAMINE SULFATE 7.5; 7.5; 7.5; 7.5 MG/1; MG/1; MG/1; MG/1
30 CAPSULE, EXTENDED RELEASE ORAL EVERY MORNING
Qty: 30 CAPSULE | Refills: 0 | Status: SHIPPED | OUTPATIENT
Start: 2022-03-21 | End: 2022-04-22 | Stop reason: SDUPTHER

## 2022-03-21 RX ORDER — DEXTROAMPHETAMINE SACCHARATE, AMPHETAMINE ASPARTATE MONOHYDRATE, DEXTROAMPHETAMINE SULFATE AND AMPHETAMINE SULFATE 2.5; 2.5; 2.5; 2.5 MG/1; MG/1; MG/1; MG/1
10 CAPSULE, EXTENDED RELEASE ORAL EVERY MORNING
Qty: 30 CAPSULE | Refills: 0 | Status: SHIPPED | OUTPATIENT
Start: 2022-03-21 | End: 2022-04-22 | Stop reason: SDUPTHER

## 2022-03-30 DIAGNOSIS — Z00.00 HEALTH MAINTENANCE EXAMINATION: Primary | ICD-10-CM

## 2022-03-31 LAB
ALBUMIN SERPL-MCNC: 4.6 G/DL (ref 3.8–4.8)
ALBUMIN/GLOB SERPL: 2.2 {RATIO} (ref 1.2–2.2)
ALP SERPL-CCNC: 71 IU/L (ref 44–121)
ALT SERPL-CCNC: 22 IU/L (ref 0–32)
AST SERPL-CCNC: 22 IU/L (ref 0–40)
BASOPHILS # BLD AUTO: 0.1 X10E3/UL (ref 0–0.2)
BASOPHILS NFR BLD AUTO: 1 %
BILIRUB SERPL-MCNC: 0.4 MG/DL (ref 0–1.2)
BUN SERPL-MCNC: 21 MG/DL (ref 8–27)
BUN/CREAT SERPL: 23 (ref 12–28)
CALCIUM SERPL-MCNC: 10.3 MG/DL (ref 8.7–10.3)
CHLORIDE SERPL-SCNC: 102 MMOL/L (ref 96–106)
CHOLEST SERPL-MCNC: 170 MG/DL (ref 100–199)
CO2 SERPL-SCNC: 25 MMOL/L (ref 20–29)
CREAT SERPL-MCNC: 0.91 MG/DL (ref 0.57–1)
EGFRCR SERPLBLD CKD-EPI 2021: 71 ML/MIN/1.73
EOSINOPHIL # BLD AUTO: 0.2 X10E3/UL (ref 0–0.4)
EOSINOPHIL NFR BLD AUTO: 3 %
ERYTHROCYTE [DISTWIDTH] IN BLOOD BY AUTOMATED COUNT: 11.9 % (ref 11.7–15.4)
GLOBULIN SER CALC-MCNC: 2.1 G/DL (ref 1.5–4.5)
GLUCOSE SERPL-MCNC: 94 MG/DL (ref 65–99)
HCT VFR BLD AUTO: 44.1 % (ref 34–46.6)
HDLC SERPL-MCNC: 80 MG/DL
HGB BLD-MCNC: 14.6 G/DL (ref 11.1–15.9)
IMM GRANULOCYTES # BLD AUTO: 0 X10E3/UL (ref 0–0.1)
IMM GRANULOCYTES NFR BLD AUTO: 0 %
LDLC SERPL CALC-MCNC: 79 MG/DL (ref 0–99)
LYMPHOCYTES # BLD AUTO: 1.5 X10E3/UL (ref 0.7–3.1)
LYMPHOCYTES NFR BLD AUTO: 26 %
MCH RBC QN AUTO: 31.1 PG (ref 26.6–33)
MCHC RBC AUTO-ENTMCNC: 33.1 G/DL (ref 31.5–35.7)
MCV RBC AUTO: 94 FL (ref 79–97)
MONOCYTES # BLD AUTO: 0.5 X10E3/UL (ref 0.1–0.9)
MONOCYTES NFR BLD AUTO: 8 %
NEUTROPHILS # BLD AUTO: 3.6 X10E3/UL (ref 1.4–7)
NEUTROPHILS NFR BLD AUTO: 62 %
PLATELET # BLD AUTO: 271 X10E3/UL (ref 150–450)
POTASSIUM SERPL-SCNC: 4.9 MMOL/L (ref 3.5–5.2)
PROT SERPL-MCNC: 6.7 G/DL (ref 6–8.5)
RBC # BLD AUTO: 4.7 X10E6/UL (ref 3.77–5.28)
SODIUM SERPL-SCNC: 141 MMOL/L (ref 134–144)
TRIGL SERPL-MCNC: 54 MG/DL (ref 0–149)
TSH SERPL DL<=0.005 MIU/L-ACNC: 1.6 UIU/ML (ref 0.45–4.5)
UNABLE TO VOID: NORMAL
VLDLC SERPL CALC-MCNC: 11 MG/DL (ref 5–40)
WBC # BLD AUTO: 5.8 X10E3/UL (ref 3.4–10.8)

## 2022-04-01 ENCOUNTER — OFFICE VISIT (OUTPATIENT)
Dept: INTERNAL MEDICINE | Facility: CLINIC | Age: 62
End: 2022-04-01

## 2022-04-01 VITALS
WEIGHT: 159 LBS | HEIGHT: 69 IN | BODY MASS INDEX: 23.55 KG/M2 | DIASTOLIC BLOOD PRESSURE: 74 MMHG | HEART RATE: 82 BPM | SYSTOLIC BLOOD PRESSURE: 120 MMHG | OXYGEN SATURATION: 98 %

## 2022-04-01 DIAGNOSIS — Z00.00 WELL ADULT EXAM: Primary | ICD-10-CM

## 2022-04-01 DIAGNOSIS — Z12.11 COLON CANCER SCREENING: ICD-10-CM

## 2022-04-01 PROBLEM — J42 CHRONIC BRONCHITIS (HCC): Status: RESOLVED | Noted: 2018-11-28 | Resolved: 2022-04-01

## 2022-04-01 PROCEDURE — 99396 PREV VISIT EST AGE 40-64: CPT | Performed by: INTERNAL MEDICINE

## 2022-04-01 NOTE — PROGRESS NOTES
Subjective     Angely Mathias is a 61 y.o. female who presents for a complete physical exam.      History of Present Illness     The following data was reviewed by: Cynthia Reid MD on 04/01/2022:  Common labs    Common Labsle 7/9/21 7/9/21 3/30/22 3/30/22 3/30/22    1018 1018 0941 0941 0941   Glucose 91   94    BUN 15   21    Creatinine 1.03 (A)   0.91    eGFR Non  Am 54 (A)       eGFR African Am 66       Sodium 141   141    Potassium 4.7   4.9    Chloride 102   102    Calcium 10.1   10.3    Total Protein 7.2   6.7    Albumin 4.90   4.6    Total Bilirubin 0.5   0.4    Alkaline Phosphatase 80   71    AST (SGOT) 27   22    ALT (SGPT) 29   22    WBC   5.8     Hemoglobin   14.6     Hematocrit   44.1     Platelets   271     Total Cholesterol  191   170   Triglycerides  84   54   HDL Cholesterol  64 (A)   80   LDL Cholesterol   112 (A)   79   (A) Abnormal value       Comments are available for some flowsheets but are not being displayed.             ADHD.  Control is good.  HLD.  Excellent control.  Chronic LBP.  She only uses hydrocodone occasionally with good control.      Review of Systems    The following portions of the patient's history were reviewed and updated as appropriate: allergies, current medications, past family history, past medical history, past social history, past surgical history and problem list.  Health maintenance tab was reviewed and updated with the patient.       Patient Active Problem List    Diagnosis Date Noted   • Intervertebral disc disorder with radiculopathy of lumbar region 02/19/2020   • Other specified glaucoma 06/11/2019   • Attention deficit hyperactivity disorder (ADHD), predominantly inattentive type 10/28/2016     Note Last Updated: 10/28/2016     Neuropsych testing in 10/2016 with [facility].       • Atopic rhinitis 02/08/2016   • Hyperlipidemia 02/08/2016     Note Last Updated: 11/28/2018     Description: 1/2014.  10 year risk ASCVD was 1.7 %; 2.1% 10 year  risk.       • Chronic low back pain 2016     Note Last Updated: 2016     Description: chronic LBP.  Uses hydrocodone three times a week     • Neck pain 2016       Past Medical History:   Diagnosis Date   • Acute bronchitis    • Acute pain    • ADHD (attention deficit hyperactivity disorder)     years ago. worse w/ job type change   • Allergic     since i was little   • Blood tests for routine general physical examination    • Cholelithiasis     resolved with GB removal   • Diarrhea    • Glaucoma     treated w/ eye drops   • Headache     ocular and allergy induced   • Hyperlipidemia     noted on past lab work   • Low back pain     occasional chronic   • Lung nodule 10/18/2016    Stable 10/16-10/18   • Microscopic hematuria    • Obesity     at times   • Visual impairment 1965    wear contacts/glasses       Past Surgical History:   Procedure Laterality Date   • ABDOMINOPLASTY     • BREAST SURGERY      Enlargement Procedure   • CHOLECYSTECTOMY     • COLONOSCOPY     • COSMETIC SURGERY      breast aug 1989 and    • DIAGNOSTIC LAPAROSCOPY EXPLORATORY LAPAROTOMY     • DILATATION AND CURETTAGE     • EPIDURAL BLOCK     • EYE SURGERY      RK    • OTHER SURGICAL HISTORY      Vaginal Sling Operation for Stress Incontinence   • TUBAL ABDOMINAL LIGATION     • WRIST SURGERY         Family History   Problem Relation Age of Onset   • Breast cancer Sister    • Bipolar disorder Brother    • Diabetes Brother         type 2 DM   • Diabetes Brother    • Alcohol abuse Father         D.    • Cancer Sister         Breast ca    • Diabetes Maternal Grandfather         DX in his 80's   • Early death Brother          in sleep at 48-   • Hearing loss Mother         L ear   • Miscarriages / Stillbirths Mother         child number 4   • Vision loss Mother         cataracts replaced       Social History     Socioeconomic History   • Marital status:    Tobacco  Use   • Smoking status: Never Smoker   • Smokeless tobacco: Never Used   • Tobacco comment: none   Substance and Sexual Activity   • Alcohol use: Yes     Types: 2 Glasses of wine, 2 Standard drinks or equivalent per week   • Drug use: No   • Sexual activity: Yes     Partners: Male     Birth control/protection: Post-menopausal, Surgical     Comment: tubal ligation at 27yo., natural menopause at 38       Current Outpatient Medications on File Prior to Visit   Medication Sig Dispense Refill   • amphetamine-dextroamphetamine XR (Adderall XR) 10 MG 24 hr capsule Take 1 capsule by mouth Every Morning 30 capsule 0   • amphetamine-dextroamphetamine XR (Adderall XR) 30 MG 24 hr capsule Take 1 capsule by mouth Every Morning 30 capsule 0   • atorvastatin (LIPITOR) 20 MG tablet Take 1 tablet by mouth Daily. 90 tablet 3   • Azelastine-Fluticasone 137-50 MCG/ACT suspension Spray 1 spray in each nostril twice daily 23 g 0   • bimatoprost (LUMIGAN) 0.03 % ophthalmic drops Administer 1 drop to both eyes every night at bedtime. 2.5 mL 6   • brimonidine (ALPHAGAN) 0.2 % ophthalmic solution Administer 1 drop to both eyes 2 (Two) Times a Day. 5 mL 6   • budesonide-formoterol (SYMBICORT) 80-4.5 MCG/ACT inhaler Inhale 2 puffs 2 (Two) Times a Day. 10.2 g 11   • Calcium Carbonate-Vitamin D (CALCIUM + D PO) Take by mouth.     • estradiol (VAGIFEM) 10 MCG tablet vaginal tablet Insert 1 tablet into the vagina 2 (Two) Times a Week. 24 tablet 3   • estradiol (VIVELLE-DOT) 0.1 MG/24HR patch Place 1 patch on the skin as directed by provider 2 (Two) Times a Week. 24 patch 3   • fluorometholone (FML Liquifilm) 0.1 % ophthalmic suspension Instill 1 drop into both eyes twice daily. 10 mL 3   • HYDROcodone-acetaminophen (NORCO)  MG per tablet Take 1 tablet by mouth Every 6 (Six) Hours As Needed for Moderate Pain . 50 tablet 0   • Progesterone (PROMETRIUM) 200 MG capsule Take 1 capsule by mouth Daily. 90 capsule 3   • tiZANidine (ZANAFLEX) 4 MG  "tablet Take 1 tablet by mouth Every 6 (Six) Hours As Needed for Muscle Spasms. 30 tablet 5   • [DISCONTINUED] doxycycline (MONODOX) 100 MG capsule Take 1 capsule by mouth Daily. 30 capsule 3   • [DISCONTINUED] HYDROcod Polst-CPM Polst ER (Tussionex Pennkinetic ER) 10-8 MG/5ML ER suspension Take 5 mL by mouth Every 12 (Twelve) Hours As Needed for Cough. 115 mL 0   • [DISCONTINUED] Scopolamine (Transderm-Scop, 1.5 MG,) 1 MG/3DAYS patch Place 1 patch on the skin as directed by provider Every 72 (Seventy-Two) Hours. 4 patch 0   • [DISCONTINUED] Tuberculin-Allergy Syringes (B-D ALLERGY SYRINGE 1CC/28G) 28G X 1/2\" 1 ML misc Use for injections 1 to 2 times per week. 100 each 3   • [DISCONTINUED] bimatoprost (LUMIGAN) 0.03 % ophthalmic drops Instill 1 drop into each eye every night at bedtime 2.5 mL 5   • [DISCONTINUED] brimonidine (ALPHAGAN) 0.2 % ophthalmic solution Instill 1 drop into both eyes twice daily 5 mL 4   • [DISCONTINUED] DYMISTA 137-50 MCG/ACT suspension      • [DISCONTINUED] estradiol (VAGIFEM) 10 MCG tablet vaginal tablet Insert 1 tablet into the vagina 2 (Two) Times a Week. 24 tablet 3     No current facility-administered medications on file prior to visit.       Allergies   Allergen Reactions   • Contrast Dye Shortness Of Breath   • Pistachio Nut (Diagnostic) Anaphylaxis   • Codeine Itching   • Morphine And Related Itching and Rash   • Sulfa Antibiotics Itching and Rash       Immunization History   Administered Date(s) Administered   • COVID-19 (MODERNA) BOOSTER 11/05/2021   • COVID-19 (PFIZER) PURPLE CAP 01/05/2021, 01/28/2021   • Flu Vaccine Intradermal Quad 18-64YR 10/11/2018   • Flu Vaccine Quad PF >36MO 10/21/2020, 11/05/2021   • Flucelvax Quad Vial =>4yrs 11/07/2019   • Hepatitis A 04/30/2018, 11/15/2018   • Influenza, Unspecified 10/01/2020   • Tdap 01/24/2014       Objective     /74   Pulse 82   Ht 175 cm (68.9\")   Wt 72.1 kg (159 lb)   SpO2 98%   BMI 23.55 kg/m²     Physical " Exam  Constitutional:       Appearance: She is well-developed.   HENT:      Head: Normocephalic and atraumatic.      Right Ear: Hearing, tympanic membrane and external ear normal.      Left Ear: Hearing, tympanic membrane and external ear normal.      Nose: Nose normal.   Neck:      Thyroid: No thyromegaly.   Cardiovascular:      Rate and Rhythm: Normal rate and regular rhythm.      Heart sounds: Normal heart sounds. No murmur heard.  Pulmonary:      Effort: Pulmonary effort is normal.      Breath sounds: Normal breath sounds.   Chest:   Breasts:      Right: No mass.      Left: No mass.       Abdominal:      General: There is no distension.      Palpations: Abdomen is soft.      Tenderness: There is no abdominal tenderness.   Musculoskeletal:      Cervical back: Neck supple.   Lymphadenopathy:      Cervical: No cervical adenopathy.   Skin:     General: Skin is warm and dry.   Neurological:      Mental Status: She is alert and oriented to person, place, and time.   Psychiatric:         Speech: Speech normal.         Behavior: Behavior normal.         Thought Content: Thought content normal.         Judgment: Judgment normal.         Assessment/Plan   Diagnoses and all orders for this visit:    1. Well adult exam (Primary)        Discussion    Patient presents today for a CPE.      Patient follows a healthy diet.   Patient follows an adequate exercise regimen. Mammogram is up to date.   Patient has been referred for colon cancer screening.   Pap smears are performed by the patient's gynecologist.   DEXA is up to date.  I have recommended that the patient get the following immunizations:  Shingrix.      Health Maintenance   Topic Date Due   • ZOSTER VACCINE (1 of 2) Never done   • PAP SMEAR  09/01/2021   • ANNUAL PHYSICAL  03/30/2022   • INFLUENZA VACCINE  08/01/2022   • COLORECTAL CANCER SCREENING  08/03/2022   • MAMMOGRAM  11/20/2022   • LIPID PANEL  03/30/2023   • TDAP/TD VACCINES (2 - Td or Tdap) 01/24/2024   •  HEPATITIS C SCREENING  Completed   • COVID-19 Vaccine  Completed   • Pneumococcal Vaccine 0-64  Aged Out            No future appointments.

## 2022-04-04 ENCOUNTER — PRE-PROCEDURE SCREENING (OUTPATIENT)
Dept: GASTROENTEROLOGY | Facility: CLINIC | Age: 62
End: 2022-04-04

## 2022-04-11 DIAGNOSIS — M54.50 LOW BACK PAIN, UNSPECIFIED BACK PAIN LATERALITY, UNSPECIFIED CHRONICITY, UNSPECIFIED WHETHER SCIATICA PRESENT: ICD-10-CM

## 2022-04-11 RX ORDER — HYDROCODONE BITARTRATE AND ACETAMINOPHEN 10; 325 MG/1; MG/1
1 TABLET ORAL EVERY 6 HOURS PRN
Qty: 50 TABLET | Refills: 0 | Status: SHIPPED | OUTPATIENT
Start: 2022-04-11 | End: 2022-12-01 | Stop reason: SDUPTHER

## 2022-04-11 RX ORDER — TIZANIDINE 4 MG/1
4 TABLET ORAL EVERY 6 HOURS PRN
Qty: 30 TABLET | Refills: 5 | Status: SHIPPED | OUTPATIENT
Start: 2022-04-11 | End: 2022-12-01 | Stop reason: SDUPTHER

## 2022-04-22 DIAGNOSIS — F90.0 ATTENTION DEFICIT HYPERACTIVITY DISORDER (ADHD), PREDOMINANTLY INATTENTIVE TYPE: ICD-10-CM

## 2022-04-25 RX ORDER — DEXTROAMPHETAMINE SACCHARATE, AMPHETAMINE ASPARTATE MONOHYDRATE, DEXTROAMPHETAMINE SULFATE AND AMPHETAMINE SULFATE 7.5; 7.5; 7.5; 7.5 MG/1; MG/1; MG/1; MG/1
30 CAPSULE, EXTENDED RELEASE ORAL EVERY MORNING
Qty: 30 CAPSULE | Refills: 0 | Status: SHIPPED | OUTPATIENT
Start: 2022-04-25 | End: 2022-05-27 | Stop reason: SDUPTHER

## 2022-04-25 RX ORDER — DEXTROAMPHETAMINE SACCHARATE, AMPHETAMINE ASPARTATE MONOHYDRATE, DEXTROAMPHETAMINE SULFATE AND AMPHETAMINE SULFATE 2.5; 2.5; 2.5; 2.5 MG/1; MG/1; MG/1; MG/1
10 CAPSULE, EXTENDED RELEASE ORAL EVERY MORNING
Qty: 30 CAPSULE | Refills: 0 | Status: SHIPPED | OUTPATIENT
Start: 2022-04-25 | End: 2022-05-27 | Stop reason: SDUPTHER

## 2022-05-05 ENCOUNTER — PREP FOR SURGERY (OUTPATIENT)
Dept: OTHER | Facility: HOSPITAL | Age: 62
End: 2022-05-05

## 2022-05-05 DIAGNOSIS — Z86.010 HISTORY OF ADENOMATOUS POLYP OF COLON: Primary | ICD-10-CM

## 2022-05-13 ENCOUNTER — TELEPHONE (OUTPATIENT)
Dept: GASTROENTEROLOGY | Facility: CLINIC | Age: 62
End: 2022-05-13

## 2022-05-13 PROBLEM — Z86.010 HISTORY OF ADENOMATOUS POLYP OF COLON: Status: ACTIVE | Noted: 2022-05-13

## 2022-05-13 PROBLEM — Z86.0101 HISTORY OF ADENOMATOUS POLYP OF COLON: Status: ACTIVE | Noted: 2022-05-13

## 2022-05-13 NOTE — TELEPHONE ENCOUNTER
sam Jackson for 08/01 arrive at 830/cs mailing out prep inst on 05/13, advised pt time is subject to change [facility] will call.

## 2022-05-27 DIAGNOSIS — F90.0 ATTENTION DEFICIT HYPERACTIVITY DISORDER (ADHD), PREDOMINANTLY INATTENTIVE TYPE: ICD-10-CM

## 2022-05-27 RX ORDER — DEXTROAMPHETAMINE SACCHARATE, AMPHETAMINE ASPARTATE MONOHYDRATE, DEXTROAMPHETAMINE SULFATE AND AMPHETAMINE SULFATE 2.5; 2.5; 2.5; 2.5 MG/1; MG/1; MG/1; MG/1
10 CAPSULE, EXTENDED RELEASE ORAL EVERY MORNING
Qty: 30 CAPSULE | Refills: 0 | Status: SHIPPED | OUTPATIENT
Start: 2022-05-27 | End: 2022-06-24 | Stop reason: SDUPTHER

## 2022-05-27 RX ORDER — DEXTROAMPHETAMINE SACCHARATE, AMPHETAMINE ASPARTATE MONOHYDRATE, DEXTROAMPHETAMINE SULFATE AND AMPHETAMINE SULFATE 7.5; 7.5; 7.5; 7.5 MG/1; MG/1; MG/1; MG/1
30 CAPSULE, EXTENDED RELEASE ORAL EVERY MORNING
Qty: 30 CAPSULE | Refills: 0 | Status: SHIPPED | OUTPATIENT
Start: 2022-05-27 | End: 2022-06-24 | Stop reason: SDUPTHER

## 2022-06-24 DIAGNOSIS — F90.0 ATTENTION DEFICIT HYPERACTIVITY DISORDER (ADHD), PREDOMINANTLY INATTENTIVE TYPE: ICD-10-CM

## 2022-06-24 RX ORDER — DEXTROAMPHETAMINE SACCHARATE, AMPHETAMINE ASPARTATE MONOHYDRATE, DEXTROAMPHETAMINE SULFATE AND AMPHETAMINE SULFATE 7.5; 7.5; 7.5; 7.5 MG/1; MG/1; MG/1; MG/1
30 CAPSULE, EXTENDED RELEASE ORAL EVERY MORNING
Qty: 30 CAPSULE | Refills: 0 | Status: SHIPPED | OUTPATIENT
Start: 2022-06-24 | End: 2022-07-29 | Stop reason: SDUPTHER

## 2022-06-24 RX ORDER — DEXTROAMPHETAMINE SACCHARATE, AMPHETAMINE ASPARTATE MONOHYDRATE, DEXTROAMPHETAMINE SULFATE AND AMPHETAMINE SULFATE 2.5; 2.5; 2.5; 2.5 MG/1; MG/1; MG/1; MG/1
10 CAPSULE, EXTENDED RELEASE ORAL EVERY MORNING
Qty: 30 CAPSULE | Refills: 0 | Status: SHIPPED | OUTPATIENT
Start: 2022-06-24 | End: 2022-07-29 | Stop reason: SDUPTHER

## 2022-07-19 ENCOUNTER — TELEPHONE (OUTPATIENT)
Dept: GASTROENTEROLOGY | Facility: CLINIC | Age: 62
End: 2022-07-19

## 2022-07-19 NOTE — TELEPHONE ENCOUNTER
Caller: Angely Mathias    Relationship to patient: Self    Best call back number:[phone]    Type of visit: COLONOSCOPY    If rescheduling, when is the original appointment: 08.01.22    Additional notes: PT WANTS TO RESCHEDULE APPT

## 2022-07-29 DIAGNOSIS — F90.0 ATTENTION DEFICIT HYPERACTIVITY DISORDER (ADHD), PREDOMINANTLY INATTENTIVE TYPE: ICD-10-CM

## 2022-07-29 RX ORDER — DEXTROAMPHETAMINE SACCHARATE, AMPHETAMINE ASPARTATE MONOHYDRATE, DEXTROAMPHETAMINE SULFATE AND AMPHETAMINE SULFATE 2.5; 2.5; 2.5; 2.5 MG/1; MG/1; MG/1; MG/1
10 CAPSULE, EXTENDED RELEASE ORAL EVERY MORNING
Qty: 30 CAPSULE | Refills: 0 | Status: SHIPPED | OUTPATIENT
Start: 2022-07-29 | End: 2022-08-28 | Stop reason: SDUPTHER

## 2022-07-29 RX ORDER — DEXTROAMPHETAMINE SACCHARATE, AMPHETAMINE ASPARTATE MONOHYDRATE, DEXTROAMPHETAMINE SULFATE AND AMPHETAMINE SULFATE 7.5; 7.5; 7.5; 7.5 MG/1; MG/1; MG/1; MG/1
30 CAPSULE, EXTENDED RELEASE ORAL EVERY MORNING
Qty: 30 CAPSULE | Refills: 0 | Status: SHIPPED | OUTPATIENT
Start: 2022-07-29 | End: 2022-08-28 | Stop reason: SDUPTHER

## 2022-08-28 DIAGNOSIS — F90.0 ATTENTION DEFICIT HYPERACTIVITY DISORDER (ADHD), PREDOMINANTLY INATTENTIVE TYPE: ICD-10-CM

## 2022-08-29 RX ORDER — DEXTROAMPHETAMINE SACCHARATE, AMPHETAMINE ASPARTATE MONOHYDRATE, DEXTROAMPHETAMINE SULFATE AND AMPHETAMINE SULFATE 2.5; 2.5; 2.5; 2.5 MG/1; MG/1; MG/1; MG/1
10 CAPSULE, EXTENDED RELEASE ORAL EVERY MORNING
Qty: 30 CAPSULE | Refills: 0 | Status: SHIPPED | OUTPATIENT
Start: 2022-08-29 | End: 2022-09-29 | Stop reason: SDUPTHER

## 2022-08-29 RX ORDER — DEXTROAMPHETAMINE SACCHARATE, AMPHETAMINE ASPARTATE MONOHYDRATE, DEXTROAMPHETAMINE SULFATE AND AMPHETAMINE SULFATE 7.5; 7.5; 7.5; 7.5 MG/1; MG/1; MG/1; MG/1
30 CAPSULE, EXTENDED RELEASE ORAL EVERY MORNING
Qty: 30 CAPSULE | Refills: 0 | Status: SHIPPED | OUTPATIENT
Start: 2022-08-29 | End: 2022-09-29 | Stop reason: SDUPTHER

## 2022-09-16 ENCOUNTER — TELEPHONE (OUTPATIENT)
Dept: GASTROENTEROLOGY | Facility: CLINIC | Age: 62
End: 2022-09-16

## 2022-09-16 NOTE — TELEPHONE ENCOUNTER
Caller: Angely Mathias    Relationship to patient: Self    Best call back number: [phone]    Chief complaint: RESCHEDULE    Type of visit: COLONOSCOPY    Requested date: NEXT AVAILABLE    If rescheduling, when is the original appointment  10/03/22    Additional notes: PT CALLED TO CANCEL AND RESCHEDULE

## 2022-09-28 DIAGNOSIS — F90.0 ATTENTION DEFICIT HYPERACTIVITY DISORDER (ADHD), PREDOMINANTLY INATTENTIVE TYPE: ICD-10-CM

## 2022-09-28 RX ORDER — DEXTROAMPHETAMINE SACCHARATE, AMPHETAMINE ASPARTATE MONOHYDRATE, DEXTROAMPHETAMINE SULFATE AND AMPHETAMINE SULFATE 2.5; 2.5; 2.5; 2.5 MG/1; MG/1; MG/1; MG/1
10 CAPSULE, EXTENDED RELEASE ORAL EVERY MORNING
Qty: 30 CAPSULE | Refills: 0 | Status: CANCELLED | OUTPATIENT
Start: 2022-09-28

## 2022-09-28 RX ORDER — DEXTROAMPHETAMINE SACCHARATE, AMPHETAMINE ASPARTATE MONOHYDRATE, DEXTROAMPHETAMINE SULFATE AND AMPHETAMINE SULFATE 7.5; 7.5; 7.5; 7.5 MG/1; MG/1; MG/1; MG/1
30 CAPSULE, EXTENDED RELEASE ORAL EVERY MORNING
Qty: 30 CAPSULE | Refills: 0 | Status: CANCELLED | OUTPATIENT
Start: 2022-09-28

## 2022-09-29 DIAGNOSIS — F90.0 ATTENTION DEFICIT HYPERACTIVITY DISORDER (ADHD), PREDOMINANTLY INATTENTIVE TYPE: ICD-10-CM

## 2022-09-29 RX ORDER — DEXTROAMPHETAMINE SACCHARATE, AMPHETAMINE ASPARTATE MONOHYDRATE, DEXTROAMPHETAMINE SULFATE AND AMPHETAMINE SULFATE 2.5; 2.5; 2.5; 2.5 MG/1; MG/1; MG/1; MG/1
10 CAPSULE, EXTENDED RELEASE ORAL EVERY MORNING
Qty: 30 CAPSULE | Refills: 0 | Status: SHIPPED | OUTPATIENT
Start: 2022-09-29 | End: 2022-10-29 | Stop reason: SDUPTHER

## 2022-09-29 RX ORDER — DEXTROAMPHETAMINE SACCHARATE, AMPHETAMINE ASPARTATE MONOHYDRATE, DEXTROAMPHETAMINE SULFATE AND AMPHETAMINE SULFATE 7.5; 7.5; 7.5; 7.5 MG/1; MG/1; MG/1; MG/1
30 CAPSULE, EXTENDED RELEASE ORAL EVERY MORNING
Qty: 30 CAPSULE | Refills: 0 | Status: SHIPPED | OUTPATIENT
Start: 2022-09-29 | End: 2022-10-29 | Stop reason: SDUPTHER

## 2022-09-29 RX ORDER — ATORVASTATIN CALCIUM 20 MG/1
20 TABLET, FILM COATED ORAL DAILY
Qty: 90 TABLET | Refills: 3 | Status: SHIPPED | OUTPATIENT
Start: 2022-09-29

## 2022-10-05 ENCOUNTER — TELEPHONE (OUTPATIENT)
Dept: GASTROENTEROLOGY | Facility: CLINIC | Age: 62
End: 2022-10-05

## 2022-10-29 DIAGNOSIS — F90.0 ATTENTION DEFICIT HYPERACTIVITY DISORDER (ADHD), PREDOMINANTLY INATTENTIVE TYPE: ICD-10-CM

## 2022-10-31 RX ORDER — DEXTROAMPHETAMINE SACCHARATE, AMPHETAMINE ASPARTATE MONOHYDRATE, DEXTROAMPHETAMINE SULFATE AND AMPHETAMINE SULFATE 7.5; 7.5; 7.5; 7.5 MG/1; MG/1; MG/1; MG/1
30 CAPSULE, EXTENDED RELEASE ORAL EVERY MORNING
Qty: 30 CAPSULE | Refills: 0 | Status: SHIPPED | OUTPATIENT
Start: 2022-10-31 | End: 2022-12-01 | Stop reason: SDUPTHER

## 2022-10-31 RX ORDER — DEXTROAMPHETAMINE SACCHARATE, AMPHETAMINE ASPARTATE MONOHYDRATE, DEXTROAMPHETAMINE SULFATE AND AMPHETAMINE SULFATE 2.5; 2.5; 2.5; 2.5 MG/1; MG/1; MG/1; MG/1
10 CAPSULE, EXTENDED RELEASE ORAL EVERY MORNING
Qty: 30 CAPSULE | Refills: 0 | Status: SHIPPED | OUTPATIENT
Start: 2022-10-31 | End: 2022-11-09 | Stop reason: SDUPTHER

## 2022-11-09 DIAGNOSIS — F90.0 ATTENTION DEFICIT HYPERACTIVITY DISORDER (ADHD), PREDOMINANTLY INATTENTIVE TYPE: ICD-10-CM

## 2022-11-09 RX ORDER — DEXTROAMPHETAMINE SACCHARATE, AMPHETAMINE ASPARTATE MONOHYDRATE, DEXTROAMPHETAMINE SULFATE AND AMPHETAMINE SULFATE 2.5; 2.5; 2.5; 2.5 MG/1; MG/1; MG/1; MG/1
10 CAPSULE, EXTENDED RELEASE ORAL EVERY MORNING
Qty: 30 CAPSULE | Refills: 0 | Status: SHIPPED | OUTPATIENT
Start: 2022-11-09 | End: 2022-12-01 | Stop reason: SDUPTHER

## 2022-12-01 DIAGNOSIS — M54.50 LOW BACK PAIN, UNSPECIFIED BACK PAIN LATERALITY, UNSPECIFIED CHRONICITY, UNSPECIFIED WHETHER SCIATICA PRESENT: ICD-10-CM

## 2022-12-01 DIAGNOSIS — F90.0 ATTENTION DEFICIT HYPERACTIVITY DISORDER (ADHD), PREDOMINANTLY INATTENTIVE TYPE: ICD-10-CM

## 2022-12-01 RX ORDER — TIZANIDINE 4 MG/1
4 TABLET ORAL EVERY 6 HOURS PRN
Qty: 30 TABLET | Refills: 5 | Status: SHIPPED | OUTPATIENT
Start: 2022-12-01

## 2022-12-01 RX ORDER — HYDROCODONE BITARTRATE AND ACETAMINOPHEN 10; 325 MG/1; MG/1
1 TABLET ORAL EVERY 6 HOURS PRN
Qty: 50 TABLET | Refills: 0 | Status: SHIPPED | OUTPATIENT
Start: 2022-12-01 | End: 2023-01-09

## 2022-12-01 RX ORDER — DEXTROAMPHETAMINE SACCHARATE, AMPHETAMINE ASPARTATE MONOHYDRATE, DEXTROAMPHETAMINE SULFATE AND AMPHETAMINE SULFATE 7.5; 7.5; 7.5; 7.5 MG/1; MG/1; MG/1; MG/1
30 CAPSULE, EXTENDED RELEASE ORAL EVERY MORNING
Qty: 30 CAPSULE | Refills: 0 | Status: SHIPPED | OUTPATIENT
Start: 2022-12-01 | End: 2022-12-30 | Stop reason: SDUPTHER

## 2022-12-01 RX ORDER — DEXTROAMPHETAMINE SACCHARATE, AMPHETAMINE ASPARTATE MONOHYDRATE, DEXTROAMPHETAMINE SULFATE AND AMPHETAMINE SULFATE 2.5; 2.5; 2.5; 2.5 MG/1; MG/1; MG/1; MG/1
10 CAPSULE, EXTENDED RELEASE ORAL EVERY MORNING
Qty: 30 CAPSULE | Refills: 0 | Status: SHIPPED | OUTPATIENT
Start: 2022-12-01 | End: 2022-12-30 | Stop reason: SDUPTHER

## 2022-12-14 ENCOUNTER — APPOINTMENT (OUTPATIENT)
Dept: WOMENS IMAGING | Facility: HOSPITAL | Age: 62
End: 2022-12-14

## 2022-12-14 PROCEDURE — 77067 SCR MAMMO BI INCL CAD: CPT | Performed by: RADIOLOGY

## 2022-12-14 PROCEDURE — 77063 BREAST TOMOSYNTHESIS BI: CPT | Performed by: RADIOLOGY

## 2022-12-30 DIAGNOSIS — F90.0 ATTENTION DEFICIT HYPERACTIVITY DISORDER (ADHD), PREDOMINANTLY INATTENTIVE TYPE: ICD-10-CM

## 2022-12-30 RX ORDER — DEXTROAMPHETAMINE SACCHARATE, AMPHETAMINE ASPARTATE MONOHYDRATE, DEXTROAMPHETAMINE SULFATE AND AMPHETAMINE SULFATE 2.5; 2.5; 2.5; 2.5 MG/1; MG/1; MG/1; MG/1
10 CAPSULE, EXTENDED RELEASE ORAL EVERY MORNING
Qty: 30 CAPSULE | Refills: 0 | Status: SHIPPED | OUTPATIENT
Start: 2022-12-30 | End: 2023-01-03 | Stop reason: SDUPTHER

## 2022-12-30 RX ORDER — DEXTROAMPHETAMINE SACCHARATE, AMPHETAMINE ASPARTATE MONOHYDRATE, DEXTROAMPHETAMINE SULFATE AND AMPHETAMINE SULFATE 7.5; 7.5; 7.5; 7.5 MG/1; MG/1; MG/1; MG/1
30 CAPSULE, EXTENDED RELEASE ORAL EVERY MORNING
Qty: 30 CAPSULE | Refills: 0 | Status: SHIPPED | OUTPATIENT
Start: 2022-12-30 | End: 2023-01-03

## 2023-01-03 ENCOUNTER — TELEPHONE (OUTPATIENT)
Dept: GASTROENTEROLOGY | Facility: CLINIC | Age: 63
End: 2023-01-03
Payer: COMMERCIAL

## 2023-01-03 DIAGNOSIS — F90.0 ATTENTION DEFICIT HYPERACTIVITY DISORDER (ADHD), PREDOMINANTLY INATTENTIVE TYPE: ICD-10-CM

## 2023-01-03 RX ORDER — DEXTROAMPHETAMINE SACCHARATE, AMPHETAMINE ASPARTATE MONOHYDRATE, DEXTROAMPHETAMINE SULFATE AND AMPHETAMINE SULFATE 2.5; 2.5; 2.5; 2.5 MG/1; MG/1; MG/1; MG/1
CAPSULE, EXTENDED RELEASE ORAL
Qty: 120 CAPSULE | Refills: 0 | Status: SHIPPED | OUTPATIENT
Start: 2023-01-03 | End: 2023-01-09

## 2023-01-03 NOTE — TELEPHONE ENCOUNTER
Caller: Angely Mathias    Relationship to patient: Self    Best call back number: [phone] (Mobile)    Patient is needing:    PT IS CALLING TO GET RESCHED FOR HER COLONOSCOPY SHE MISSED IN OCT. PT IS REQUESTING SOMEONE CALL HER BACK ASAP.

## 2023-01-09 ENCOUNTER — TELEPHONE (OUTPATIENT)
Dept: INTERNAL MEDICINE | Facility: CLINIC | Age: 63
End: 2023-01-09
Payer: COMMERCIAL

## 2023-01-09 DIAGNOSIS — F90.0 ATTENTION DEFICIT HYPERACTIVITY DISORDER (ADHD), PREDOMINANTLY INATTENTIVE TYPE: Primary | ICD-10-CM

## 2023-01-09 RX ORDER — DEXTROAMPHETAMINE SACCHARATE, AMPHETAMINE ASPARTATE MONOHYDRATE, DEXTROAMPHETAMINE SULFATE AND AMPHETAMINE SULFATE 7.5; 7.5; 7.5; 7.5 MG/1; MG/1; MG/1; MG/1
30 CAPSULE, EXTENDED RELEASE ORAL EVERY MORNING
Qty: 30 CAPSULE | Refills: 0 | Status: SHIPPED | OUTPATIENT
Start: 2023-01-09 | End: 2023-02-06

## 2023-01-09 RX ORDER — DEXTROAMPHETAMINE SACCHARATE, AMPHETAMINE ASPARTATE MONOHYDRATE, DEXTROAMPHETAMINE SULFATE AND AMPHETAMINE SULFATE 2.5; 2.5; 2.5; 2.5 MG/1; MG/1; MG/1; MG/1
10 CAPSULE, EXTENDED RELEASE ORAL EVERY MORNING
Qty: 30 CAPSULE | Refills: 0 | Status: SHIPPED | OUTPATIENT
Start: 2023-01-09 | End: 2023-02-06 | Stop reason: SDUPTHER

## 2023-01-09 NOTE — TELEPHONE ENCOUNTER
----- Message from Lurdes Chapa MA sent at 1/9/2023  7:55 AM EST -----  Regarding: FW: adderall  Contact: [phone]    ----- Message -----  From: Angely Mathias  Sent: 1/8/2023   3:20 PM EST  To: [facility]  Subject: adderall                                         Dr Reid,  I went to the [facility] to get the additional 10mg Adderalls you sent in for me since the pharmacy does not have the 30mg i usually get.   Unfortunately our [facility] Rx Insurance will not pay for the additional 10 mg Adderall's until the end of January unless you get  a prior authorization. This info came from the pharmacist at [facility]. I offered to pay cash but they would have cost me $800. So that's a No Go!   Would you please get a prior authorization for the additional adderal?   Just FYI- The vendor that the pharmacy uses--   [facility], [facility],  and [facility] also use. So none of those pharmacies have 30 mg capsules either.  Thank you for your time.  Please reach out if you need other info from me.  Angely

## 2023-01-16 ENCOUNTER — TELEPHONE (OUTPATIENT)
Dept: GASTROENTEROLOGY | Facility: CLINIC | Age: 63
End: 2023-01-16
Payer: COMMERCIAL

## 2023-01-16 NOTE — TELEPHONE ENCOUNTER
MAZIN patient via telephone for COLONOSCOPY. Scheduled 02/24/2023 with arrival time of 0945AM. Prep paperwork mailed to verified address on file. Patient advised arrival time may change based on [facility] guidelines. MAZIN PERES

## 2023-01-19 RX ORDER — BIMATOPROST 0.01 %
1 DROPS OPHTHALMIC (EYE) NIGHTLY
Qty: 2.5 ML | Refills: 6 | Status: SHIPPED | OUTPATIENT
Start: 2023-01-19

## 2023-01-23 ENCOUNTER — TELEPHONE (OUTPATIENT)
Dept: GASTROENTEROLOGY | Facility: CLINIC | Age: 63
End: 2023-01-23
Payer: COMMERCIAL

## 2023-02-03 ENCOUNTER — TELEPHONE (OUTPATIENT)
Dept: GASTROENTEROLOGY | Facility: CLINIC | Age: 63
End: 2023-02-03
Payer: COMMERCIAL

## 2023-02-03 NOTE — TELEPHONE ENCOUNTER
Patient called to schedule colonoscopy when Dr. Tidwell because she heard the prep in much better to tolerate and would like to go to [facility].    Explained about [employer] forms.  Will e-mail to her [email or web address].    She states scheduled with Dr. Watts on 02/24/2023, going to call his office to cancel.  Will sent secure message to SHAWN [place], manager and PATRIICA Prather.

## 2023-02-03 NOTE — TELEPHONE ENCOUNTER
WILLIAM - Scheduled for Dr. Watts on 02/24/2023 for colonoscopy.  She has decide to see Dr. Tidwell instead.  Patient will be calling to cancel.

## 2023-02-03 NOTE — TELEPHONE ENCOUNTER
Hub staff attempted to follow warm transfer process and was unsuccessful     Caller: Angely Mathias    Relationship to patient: Self    Best call back number: [phone]    Patient is needing:  TO CANCEL HER COLONOSCOPY WITH DR. SCHOFIELD. SHE DOES NOT WISH TO RESCHEDULE. NO REASON GIVEN.

## 2023-02-06 ENCOUNTER — PREP FOR SURGERY (OUTPATIENT)
Dept: SURGERY | Facility: SURGERY CENTER | Age: 63
End: 2023-02-06
Payer: COMMERCIAL

## 2023-02-06 ENCOUNTER — TELEPHONE (OUTPATIENT)
Dept: GASTROENTEROLOGY | Facility: CLINIC | Age: 63
End: 2023-02-06
Payer: COMMERCIAL

## 2023-02-06 DIAGNOSIS — F90.0 ATTENTION DEFICIT HYPERACTIVITY DISORDER (ADHD), PREDOMINANTLY INATTENTIVE TYPE: ICD-10-CM

## 2023-02-06 DIAGNOSIS — Z12.11 SCREENING FOR MALIGNANT NEOPLASM OF COLON: Primary | ICD-10-CM

## 2023-02-06 RX ORDER — DEXTROAMPHETAMINE SACCHARATE, AMPHETAMINE ASPARTATE MONOHYDRATE, DEXTROAMPHETAMINE SULFATE AND AMPHETAMINE SULFATE 3.75; 3.75; 3.75; 3.75 MG/1; MG/1; MG/1; MG/1
30 CAPSULE, EXTENDED RELEASE ORAL EVERY MORNING
Qty: 60 CAPSULE | Refills: 0 | Status: SHIPPED | OUTPATIENT
Start: 2023-02-06 | End: 2023-03-09 | Stop reason: SDUPTHER

## 2023-02-06 RX ORDER — DEXTROAMPHETAMINE SACCHARATE, AMPHETAMINE ASPARTATE MONOHYDRATE, DEXTROAMPHETAMINE SULFATE AND AMPHETAMINE SULFATE 2.5; 2.5; 2.5; 2.5 MG/1; MG/1; MG/1; MG/1
10 CAPSULE, EXTENDED RELEASE ORAL
Qty: 30 CAPSULE | Refills: 0 | Status: SHIPPED | OUTPATIENT
Start: 2023-02-06 | End: 2023-03-09 | Stop reason: SDUPTHER

## 2023-02-06 NOTE — TELEPHONE ENCOUNTER
FAST TRACK - LAST COLONOSCOPY 08/03/2012 BY KANWAL FUENTES  SCREENING  NO FAMILY HISTORY CRC/P  SCHEDULE AT [place].

## 2023-02-08 PROBLEM — Z12.11 SCREENING FOR MALIGNANT NEOPLASM OF COLON: Status: ACTIVE | Noted: 2023-02-08

## 2023-02-08 NOTE — TELEPHONE ENCOUNTER
Spoke with patient.  Scheduled at [facility] 05/09/2023 at 12pm - arrive 11am.  Will e-mail instructions [email or web address] today.

## 2023-03-09 ENCOUNTER — PATIENT MESSAGE (OUTPATIENT)
Dept: INTERNAL MEDICINE | Facility: CLINIC | Age: 63
End: 2023-03-09
Payer: COMMERCIAL

## 2023-03-09 DIAGNOSIS — F90.0 ATTENTION DEFICIT HYPERACTIVITY DISORDER (ADHD), PREDOMINANTLY INATTENTIVE TYPE: ICD-10-CM

## 2023-03-09 RX ORDER — DEXTROAMPHETAMINE SACCHARATE, AMPHETAMINE ASPARTATE MONOHYDRATE, DEXTROAMPHETAMINE SULFATE AND AMPHETAMINE SULFATE 2.5; 2.5; 2.5; 2.5 MG/1; MG/1; MG/1; MG/1
10 CAPSULE, EXTENDED RELEASE ORAL
Qty: 30 CAPSULE | Refills: 0 | Status: SHIPPED | OUTPATIENT
Start: 2023-03-09

## 2023-03-09 RX ORDER — DEXTROAMPHETAMINE SACCHARATE, AMPHETAMINE ASPARTATE MONOHYDRATE, DEXTROAMPHETAMINE SULFATE AND AMPHETAMINE SULFATE 3.75; 3.75; 3.75; 3.75 MG/1; MG/1; MG/1; MG/1
30 CAPSULE, EXTENDED RELEASE ORAL EVERY MORNING
Qty: 60 CAPSULE | Refills: 0 | Status: SHIPPED | OUTPATIENT
Start: 2023-03-09

## 2023-03-09 NOTE — TELEPHONE ENCOUNTER
From: Angely Mathias  To: Cynthia Reid  Sent: 3/9/2023 12:49 PM EST  Subject: Adderall    Someone just called me about Dr Ried being out of office today and waiting until tomorrow to send the new script in. I called the pharmacy and they are not sure if they will have enough left supply tomorrow.   Please have her get it to them ASAP tomorrow

## 2023-03-10 DIAGNOSIS — F90.0 ATTENTION DEFICIT HYPERACTIVITY DISORDER (ADHD), PREDOMINANTLY INATTENTIVE TYPE: ICD-10-CM

## 2023-03-29 DIAGNOSIS — Z00.00 ANNUAL PHYSICAL EXAM: Primary | ICD-10-CM

## 2023-03-29 DIAGNOSIS — E78.5 HYPERLIPIDEMIA, UNSPECIFIED HYPERLIPIDEMIA TYPE: ICD-10-CM

## 2023-03-30 LAB
ALBUMIN SERPL-MCNC: 4.3 G/DL (ref 3.5–5.2)
ALBUMIN/CREAT UR: <8 MG/G CREAT (ref 0–29)
ALBUMIN/GLOB SERPL: 1.8 G/DL
ALP SERPL-CCNC: 62 U/L (ref 39–117)
ALT SERPL-CCNC: 17 U/L (ref 1–33)
APPEARANCE UR: CLEAR
AST SERPL-CCNC: 24 U/L (ref 1–32)
BACTERIA #/AREA URNS HPF: NORMAL /HPF
BASOPHILS # BLD AUTO: 0.07 10*3/MM3 (ref 0–0.2)
BASOPHILS NFR BLD AUTO: 1.4 % (ref 0–1.5)
BILIRUB SERPL-MCNC: 0.4 MG/DL (ref 0–1.2)
BILIRUB UR QL STRIP: NEGATIVE
BUN SERPL-MCNC: 21 MG/DL (ref 8–23)
BUN/CREAT SERPL: 27.3 (ref 7–25)
CALCIUM SERPL-MCNC: 10.1 MG/DL (ref 8.6–10.5)
CASTS URNS QL MICRO: NORMAL /LPF
CHLORIDE SERPL-SCNC: 101 MMOL/L (ref 98–107)
CHOLEST SERPL-MCNC: 185 MG/DL (ref 0–200)
CO2 SERPL-SCNC: 28.7 MMOL/L (ref 22–29)
COLOR UR: YELLOW
CREAT SERPL-MCNC: 0.77 MG/DL (ref 0.57–1)
CREAT UR-MCNC: 37.8 MG/DL
EGFRCR SERPLBLD CKD-EPI 2021: 86.3 ML/MIN/1.73
EOSINOPHIL # BLD AUTO: 0.13 10*3/MM3 (ref 0–0.4)
EOSINOPHIL NFR BLD AUTO: 2.5 % (ref 0.3–6.2)
EPI CELLS #/AREA URNS HPF: NORMAL /HPF (ref 0–10)
ERYTHROCYTE [DISTWIDTH] IN BLOOD BY AUTOMATED COUNT: 11.5 % (ref 12.3–15.4)
GLOBULIN SER CALC-MCNC: 2.4 GM/DL
GLUCOSE SERPL-MCNC: 95 MG/DL (ref 65–99)
GLUCOSE UR QL STRIP: NEGATIVE
HBA1C MFR BLD: 5.2 % (ref 4.8–5.6)
HCT VFR BLD AUTO: 39.9 % (ref 34–46.6)
HDLC SERPL-MCNC: 86 MG/DL (ref 40–60)
HGB BLD-MCNC: 13.4 G/DL (ref 12–15.9)
HGB UR QL STRIP: ABNORMAL
IMM GRANULOCYTES # BLD AUTO: 0.02 10*3/MM3 (ref 0–0.05)
IMM GRANULOCYTES NFR BLD AUTO: 0.4 % (ref 0–0.5)
KETONES UR QL STRIP: NEGATIVE
LDLC SERPL CALC-MCNC: 89 MG/DL (ref 0–100)
LEUKOCYTE ESTERASE UR QL STRIP: NEGATIVE
LYMPHOCYTES # BLD AUTO: 1.47 10*3/MM3 (ref 0.7–3.1)
LYMPHOCYTES NFR BLD AUTO: 28.7 % (ref 19.6–45.3)
MCH RBC QN AUTO: 31.1 PG (ref 26.6–33)
MCHC RBC AUTO-ENTMCNC: 33.6 G/DL (ref 31.5–35.7)
MCV RBC AUTO: 92.6 FL (ref 79–97)
MICRO URNS: ABNORMAL
MICROALBUMIN UR-MCNC: <3 UG/ML
MONOCYTES # BLD AUTO: 0.42 10*3/MM3 (ref 0.1–0.9)
MONOCYTES NFR BLD AUTO: 8.2 % (ref 5–12)
NEUTROPHILS # BLD AUTO: 3.02 10*3/MM3 (ref 1.7–7)
NEUTROPHILS NFR BLD AUTO: 58.8 % (ref 42.7–76)
NITRITE UR QL STRIP: NEGATIVE
NRBC BLD AUTO-RTO: 0 /100 WBC (ref 0–0.2)
PH UR STRIP: 6 [PH] (ref 5–7.5)
PLATELET # BLD AUTO: 234 10*3/MM3 (ref 140–450)
POTASSIUM SERPL-SCNC: 4.3 MMOL/L (ref 3.5–5.2)
PROT SERPL-MCNC: 6.7 G/DL (ref 6–8.5)
PROT UR QL STRIP: NEGATIVE
RBC # BLD AUTO: 4.31 10*6/MM3 (ref 3.77–5.28)
RBC #/AREA URNS HPF: NORMAL /HPF (ref 0–2)
SODIUM SERPL-SCNC: 138 MMOL/L (ref 136–145)
SP GR UR STRIP: 1.02 (ref 1–1.03)
TRIGL SERPL-MCNC: 52 MG/DL (ref 0–150)
TSH SERPL DL<=0.005 MIU/L-ACNC: 2.87 UIU/ML (ref 0.27–4.2)
URINALYSIS REFLEX: ABNORMAL
UROBILINOGEN UR STRIP-MCNC: 0.2 MG/DL (ref 0.2–1)
VLDLC SERPL CALC-MCNC: 10 MG/DL (ref 5–40)
WBC # BLD AUTO: 5.13 10*3/MM3 (ref 3.4–10.8)
WBC #/AREA URNS HPF: NORMAL /HPF (ref 0–5)

## 2023-04-03 ENCOUNTER — OFFICE VISIT (OUTPATIENT)
Dept: INTERNAL MEDICINE | Facility: CLINIC | Age: 63
End: 2023-04-03
Payer: COMMERCIAL

## 2023-04-03 VITALS
DIASTOLIC BLOOD PRESSURE: 62 MMHG | WEIGHT: 158.5 LBS | HEIGHT: 69 IN | SYSTOLIC BLOOD PRESSURE: 118 MMHG | BODY MASS INDEX: 23.47 KG/M2 | OXYGEN SATURATION: 97 % | TEMPERATURE: 98.3 F | HEART RATE: 78 BPM

## 2023-04-03 DIAGNOSIS — Z00.00 WELL ADULT EXAM: Primary | ICD-10-CM

## 2023-04-03 DIAGNOSIS — F90.0 ATTENTION DEFICIT HYPERACTIVITY DISORDER (ADHD), PREDOMINANTLY INATTENTIVE TYPE: ICD-10-CM

## 2023-04-03 DIAGNOSIS — G89.29 CHRONIC BILATERAL LOW BACK PAIN WITH SCIATICA, SCIATICA LATERALITY UNSPECIFIED: ICD-10-CM

## 2023-04-03 DIAGNOSIS — M54.40 CHRONIC BILATERAL LOW BACK PAIN WITH SCIATICA, SCIATICA LATERALITY UNSPECIFIED: ICD-10-CM

## 2023-04-03 PROBLEM — Z12.11 SCREENING FOR MALIGNANT NEOPLASM OF COLON: Status: RESOLVED | Noted: 2023-02-08 | Resolved: 2023-04-03

## 2023-04-03 PROCEDURE — 99396 PREV VISIT EST AGE 40-64: CPT | Performed by: INTERNAL MEDICINE

## 2023-04-03 RX ORDER — HYDROCODONE BITARTRATE AND ACETAMINOPHEN 10; 325 MG/1; MG/1
1 TABLET ORAL EVERY 6 HOURS PRN
Qty: 60 TABLET | Refills: 0 | Status: SHIPPED | OUTPATIENT
Start: 2023-04-03

## 2023-04-03 NOTE — PROGRESS NOTES
Subjective     Angely Mathias is a 62 y.o. female who presents for a complete physical exam.      History of Present Illness     The following data was reviewed by: Cynthia Reid MD on 04/03/2023:  Common labs    Common Labs 3/29/23 3/29/23 3/29/23 3/29/23 3/29/23    0907 0907 0907 0907 0907   Glucose  95      BUN  21      Creatinine  0.77      Sodium  138      Potassium  4.3      Chloride  101      Calcium  10.1      Total Protein  6.7      Albumin  4.3      Total Bilirubin  0.4      Alkaline Phosphatase  62      AST (SGOT)  24      ALT (SGPT)  17      WBC 5.13       Hemoglobin 13.4       Hematocrit 39.9       Platelets 234       Total Cholesterol   185     Triglycerides   52     HDL Cholesterol   86 (A)     LDL Cholesterol    89     Hemoglobin A1C    5.20    Microalbumin, Urine     <3.0   (A) Abnormal value       Comments are available for some flowsheets but are not being displayed.           HLD.  Control is good.  ADD.   She is well controlled on Adderall.   Chronic low back pain.  Prn use of hydrocodone is effective.      Review of Systems   Constitutional: Negative.    HENT: Negative.    Eyes: Negative.    Respiratory: Negative.    Cardiovascular: Negative.    Gastrointestinal: Negative.    Endocrine: Negative.    Genitourinary: Negative.    Musculoskeletal: Positive for back pain.   Skin: Negative.    Allergic/Immunologic: Negative.    Neurological: Negative.    Hematological: Negative.    Psychiatric/Behavioral: Negative.        The following portions of the patient's history were reviewed and updated as appropriate: allergies, current medications, past family history, past medical history, past social history, past surgical history and problem list.  Health maintenance tab was reviewed and updated with the patient.       Patient Active Problem List    Diagnosis Date Noted   • History of adenomatous polyp of colon 05/13/2022     Note Last Updated: 5/13/2022     Added automatically from request for surgery  [id number]     • Intervertebral disc disorder with radiculopathy of lumbar region 02/19/2020   • Other specified glaucoma 06/11/2019   • Attention deficit hyperactivity disorder (ADHD), predominantly inattentive type 10/28/2016     Note Last Updated: 10/28/2016     Neuropsych testing in 10/2016 with [facility].       • Atopic rhinitis 02/08/2016   • Hyperlipidemia 02/08/2016     Note Last Updated: 11/28/2018     Description: 1/2014.  10 year risk ASCVD was 1.7 %; 2.1% 10 year risk.       • Chronic low back pain 02/08/2016     Note Last Updated: 2/8/2016     Description: chronic LBP.  Uses hydrocodone three times a week     • Neck pain 02/08/2016       Past Medical History:   Diagnosis Date   • Acute bronchitis    • Acute pain    • ADHD (attention deficit hyperactivity disorder) 1988    years ago. worse w/ job type change   • Allergic 1968    since i was little   • Blood tests for routine general physical examination    • Cataract 2020    Cararact surgery march 23rd & 30th, 2023   • Cholelithiasis 2000    resolved with GB removal   • Diarrhea    • Glaucoma 2018    treated w/ eye drops   • Headache 1968    ocular and allergy induced   • History of medical problems dry eye dx 2022    Eye drops.  tear duct plugs Feb10, 2023   • Hyperlipidemia 2016    noted on past lab work   • Low back pain 1993    occasional chronic   • Lung nodule 10/18/2016    Stable 10/16-10/18   • Microscopic hematuria    • Obesity     at times   • Urinary tract infection     years ago   • Visual impairment 1965    wear contacts/glasses       Past Surgical History:   Procedure Laterality Date   • ABDOMINOPLASTY     • BREAST SURGERY      Enlargement Procedure   • CHOLECYSTECTOMY     • COLONOSCOPY  2014   • COSMETIC SURGERY      breast aug 1989 and 2015/16   • DIAGNOSTIC LAPAROSCOPY EXPLORATORY LAPAROTOMY     • DILATATION AND CURETTAGE     • EPIDURAL BLOCK     • EYE SURGERY  1996 RK 1996   • OTHER SURGICAL HISTORY      Vaginal Sling  Operation for Stress Incontinence   • TUBAL ABDOMINAL LIGATION     • WRIST SURGERY         Family History   Problem Relation Age of Onset   • Breast cancer Sister    • Bipolar disorder Brother    • Diabetes Brother         type 2 DM   • Diabetes Brother    • Alcohol abuse Father         D.    • Cancer Sister         Breast ca    • Diabetes Maternal Grandfather         DX in his 80's   • Stroke Maternal Grandfather         in his 90's   • Early death Brother          in sleep at 48-   • Hearing loss Mother         L ear   • Miscarriages / Stillbirths Mother         child number 4   • Vision loss Mother         cataracts replaced       Social History     Socioeconomic History   • Marital status:    Tobacco Use   • Smoking status: Never   • Smokeless tobacco: Never   • Tobacco comments:     none   Substance and Sexual Activity   • Alcohol use: Yes     Alcohol/week: 4.0 standard drinks     Types: 2 Glasses of wine, 2 Drinks containing 0.5 oz of alcohol per week     Comment: usually one or other on a weekend night   • Drug use: No   • Sexual activity: Yes     Partners: Male     Birth control/protection: Surgical, Post-menopausal     Comment: tubal ligation at 29yo., natural menopause at 38       Current Outpatient Medications on File Prior to Visit   Medication Sig Dispense Refill   • amphetamine-dextroamphetamine XR (Adderall XR) 10 MG 24 hr capsule Take 1 capsule by mouth Daily Before Lunch 30 capsule 0   • amphetamine-dextroamphetamine XR (Adderall XR) 15 MG 24 hr capsule Take 2 capsules by mouth Every Morning 60 capsule 0   • atorvastatin (LIPITOR) 20 MG tablet Take 1 tablet by mouth Daily. 90 tablet 3   • bimatoprost (Lumigan) 0.01 % ophthalmic drops Administer 1 drop to both eyes Every Night. 2.5 mL 6   • brimonidine (ALPHAGAN) 0.2 % ophthalmic solution Administer 1 drop to both eyes 2 (Two) Times a Day. 10 mL 6   • Calcium Carbonate-Vitamin D (CALCIUM + D PO) Take by mouth.     •  estradiol (MINIVELLE, VIVELLE-DOT) 0.05 MG/24HR patch Apply 1 (one) Patch twice weekly 24 patch 3   • estradiol (Vagifem) 10 MCG tablet vaginal tablet Insert 1 (one) tablet intravaginally twice weekly 24 tablet 3   • Prednisolon-Gatiflox-Bromfenac 1-0.5-0.075 % suspension place 1 drop four times a day in right eye starting 3 days prior to surgery then follow surgical instructions 8 mL 3   • Progesterone (PROMETRIUM) 200 MG capsule Take 1 (one) capsule by mouth once daily 90 capsule 3   • tiZANidine (ZANAFLEX) 4 MG tablet Take 1 tablet by mouth Every 6 (Six) Hours As Needed for Muscle Spasms. 30 tablet 5   • [DISCONTINUED] bimatoprost (LUMIGAN) 0.03 % ophthalmic drops Administer 1 drop to both eyes every night at bedtime. 2.5 mL 6   • [DISCONTINUED] brimonidine (ALPHAGAN) 0.2 % ophthalmic solution Administer 1 drop to both eyes 2 (Two) Times a Day. 5 mL 6   • [DISCONTINUED] brimonidine (ALPHAGAN) 0.2 % ophthalmic solution instill 1 drop into both eyes twice daily 5 mL 6   • [DISCONTINUED] neomycin-polymyxin-dexamethasone (Maxitrol) 0.1 % ophthalmic suspension Instill 1 drop four times daily to right eye for 1 week 5 mL 0   • fluorometholone (FML Liquifilm) 0.1 % ophthalmic suspension Instill 1 drop into both eyes once daily as directed (Patient not taking: Reported on 4/3/2023) 10 mL 3   • [DISCONTINUED] azelastine (ASTELIN) 0.1 % nasal spray Instill 1-2 sprays into each nostril twice a day 30 mL 6   • [DISCONTINUED] bimatoprost (Lumigan) 0.01 % ophthalmic drops Administer 1 drop to both eyes Every Night. 2.5 mL 6   • [DISCONTINUED] brimonidine (ALPHAGAN) 0.2 % ophthalmic solution Instill 1 drop into the left eye twice daily 5 mL 5   • [DISCONTINUED] estradiol (VAGIFEM) 10 MCG tablet vaginal tablet Insert 1 tablet into the vagina 2 (Two) Times a Week. 24 tablet 3   • [DISCONTINUED] estradiol (Yuvafem) 10 MCG tablet vaginal tablet Place 1 (one) Tablet intravaginally twice weekly 24 tablet 3   • [DISCONTINUED]  "fluticasone (FLONASE) 50 MCG/ACT nasal spray Instill 2 sprays each nostril daily 16 g 6   • [DISCONTINUED] Progesterone (PROMETRIUM) 100 MG capsule Take 1 capsule by mouth Daily. 90 capsule 3   • [DISCONTINUED] Progesterone (PROMETRIUM) 200 MG capsule Take 1 capsule by mouth Daily. 90 capsule 3   • [DISCONTINUED] Varenicline Tartrate (Tyrvaya) 0.03 MG/ACT solution Inhale 1 spray into the nostrils twice daily 4.2 mL 3     No current facility-administered medications on file prior to visit.       Allergies   Allergen Reactions   • Contrast Dye (Echo Or Unknown Ct/Mr) Shortness Of Breath   • Pistachio Nut (Diagnostic) Anaphylaxis   • Codeine Itching   • Morphine And Related Itching and Rash   • Sulfa Antibiotics Itching and Rash       Immunization History   Administered Date(s) Administered   • COVID-19 (MODERNA) 1st, 2nd, 3rd Dose Only 11/05/2021   • COVID-19 (PFIZER) PURPLE CAP 01/05/2021, 01/28/2021   • Flu Vaccine Intradermal Quad 18-64YR 10/11/2018   • Flu Vaccine Quad PF >36MO 10/21/2020, 11/05/2021   • FluLaval/Fluzone >6mos 10/21/2020, 11/05/2021   • Flucelvax Quad Vial =>4yrs 11/07/2019   • Hepatitis A 04/30/2018, 11/15/2018   • Influenza, Unspecified 10/01/2020   • Tdap 01/24/2014       Objective     /62 (BP Location: Left arm, Patient Position: Sitting, Cuff Size: Adult)   Pulse 78   Temp 98.3 °F (36.8 °C) (Oral)   Ht 175 cm (68.9\")   Wt 71.9 kg (158 lb 8 oz)   SpO2 97%   BMI 23.47 kg/m²     Physical Exam  Constitutional:       Appearance: She is well-developed.   HENT:      Head: Normocephalic and atraumatic.      Right Ear: Hearing, tympanic membrane and external ear normal.      Left Ear: Hearing, tympanic membrane and external ear normal.      Nose: Nose normal.   Neck:      Thyroid: No thyromegaly.   Cardiovascular:      Rate and Rhythm: Normal rate and regular rhythm.      Heart sounds: Normal heart sounds. No murmur heard.  Pulmonary:      Effort: Pulmonary effort is normal.      Breath " sounds: Normal breath sounds.   Abdominal:      General: There is no distension.      Palpations: Abdomen is soft.      Tenderness: There is no abdominal tenderness.   Musculoskeletal:      Cervical back: Neck supple.   Lymphadenopathy:      Cervical: No cervical adenopathy.   Skin:     General: Skin is warm and dry.   Neurological:      Mental Status: She is alert and oriented to person, place, and time.   Psychiatric:         Speech: Speech normal.         Behavior: Behavior normal.         Thought Content: Thought content normal.         Judgment: Judgment normal.         Assessment & Plan   Diagnoses and all orders for this visit:    1. Well adult exam (Primary)    2. Chronic bilateral low back pain with sciatica, sciatica laterality unspecified  -     HYDROcodone-acetaminophen (NORCO)  MG per tablet; Take 1 tablet by mouth Every 6 (Six) Hours As Needed for Moderate Pain.  Dispense: 60 tablet; Refill: 0    3. Attention deficit hyperactivity disorder (ADHD), predominantly inattentive type        Discussion    Patient presents today for a CPE.      Patient follows a healthy diet.   Patient follows an adequate exercise regimen. Mammogram is up to date.   Colon cancer screening is up to date.   Pap smears are performed by the patient's gynecologist.  I have recommended that the patient get the following immunizations:  Shingrix.  ADD.  Control is good.  The patient is advised to continue current dosage of Adderall.    Chronic LBP.  Control is good.  The patient is advised to continue current dosage of prn hydrocodone.    Update contracts.            Health Maintenance   Topic Date Due   • ZOSTER VACCINE (1 of 2) Never done   • PAP SMEAR  09/01/2021   • COVID-19 Vaccine (4 - Booster for Pfizer series) 12/31/2021   • COLORECTAL CANCER SCREENING  08/03/2022   • ANNUAL PHYSICAL  04/01/2023   • INFLUENZA VACCINE  08/01/2023   • TDAP/TD VACCINES (2 - Td or Tdap) 01/24/2024   • LIPID PANEL  03/29/2024   • MAMMOGRAM   04/01/2024   • HEPATITIS C SCREENING  Completed   • Pneumococcal Vaccine 0-64  Aged Out            No future appointments.

## 2023-04-17 DIAGNOSIS — F90.0 ATTENTION DEFICIT HYPERACTIVITY DISORDER (ADHD), PREDOMINANTLY INATTENTIVE TYPE: ICD-10-CM

## 2023-04-17 RX ORDER — DEXTROAMPHETAMINE SACCHARATE, AMPHETAMINE ASPARTATE MONOHYDRATE, DEXTROAMPHETAMINE SULFATE AND AMPHETAMINE SULFATE 3.75; 3.75; 3.75; 3.75 MG/1; MG/1; MG/1; MG/1
30 CAPSULE, EXTENDED RELEASE ORAL EVERY MORNING
Qty: 60 CAPSULE | Refills: 0 | Status: SHIPPED | OUTPATIENT
Start: 2023-04-17

## 2023-04-17 RX ORDER — DEXTROAMPHETAMINE SACCHARATE, AMPHETAMINE ASPARTATE MONOHYDRATE, DEXTROAMPHETAMINE SULFATE AND AMPHETAMINE SULFATE 2.5; 2.5; 2.5; 2.5 MG/1; MG/1; MG/1; MG/1
10 CAPSULE, EXTENDED RELEASE ORAL
Qty: 30 CAPSULE | Refills: 0 | Status: SHIPPED | OUTPATIENT
Start: 2023-04-17

## 2023-04-25 ENCOUNTER — PREP FOR SURGERY (OUTPATIENT)
Dept: OTHER | Facility: HOSPITAL | Age: 63
End: 2023-04-25
Payer: COMMERCIAL

## 2023-05-09 ENCOUNTER — ANESTHESIA EVENT (OUTPATIENT)
Dept: SURGERY | Facility: SURGERY CENTER | Age: 63
End: 2023-05-09
Payer: COMMERCIAL

## 2023-05-09 ENCOUNTER — HOSPITAL ENCOUNTER (OUTPATIENT)
Facility: SURGERY CENTER | Age: 63
Setting detail: HOSPITAL OUTPATIENT SURGERY
Discharge: HOME | End: 2023-05-09
Attending: INTERNAL MEDICINE | Admitting: INTERNAL MEDICINE
Payer: COMMERCIAL

## 2023-05-09 ENCOUNTER — ANESTHESIA (OUTPATIENT)
Dept: SURGERY | Facility: SURGERY CENTER | Age: 63
End: 2023-05-09
Payer: COMMERCIAL

## 2023-05-09 VITALS
WEIGHT: 160 LBS | OXYGEN SATURATION: 98 % | DIASTOLIC BLOOD PRESSURE: 61 MMHG | TEMPERATURE: 97.8 F | RESPIRATION RATE: 16 BRPM | SYSTOLIC BLOOD PRESSURE: 99 MMHG | BODY MASS INDEX: 23.7 KG/M2 | HEART RATE: 77 BPM | HEIGHT: 69 IN

## 2023-05-09 PROCEDURE — 45378 DIAGNOSTIC COLONOSCOPY: CPT | Performed by: INTERNAL MEDICINE

## 2023-05-09 PROCEDURE — 25010000002 PROPOFOL 10 MG/ML EMULSION: Performed by: ANESTHESIOLOGY

## 2023-05-09 RX ORDER — SODIUM CHLORIDE 0.9 % (FLUSH) 0.9 %
10 SYRINGE (ML) INJECTION AS NEEDED
Status: DISCONTINUED | OUTPATIENT
Start: 2023-05-09 | End: 2023-05-09 | Stop reason: HOSPADM

## 2023-05-09 RX ORDER — MAGNESIUM HYDROXIDE 1200 MG/15ML
LIQUID ORAL AS NEEDED
Status: DISCONTINUED | OUTPATIENT
Start: 2023-05-09 | End: 2023-05-09 | Stop reason: HOSPADM

## 2023-05-09 RX ORDER — LIDOCAINE HYDROCHLORIDE 20 MG/ML
INJECTION, SOLUTION INFILTRATION; PERINEURAL AS NEEDED
Status: DISCONTINUED | OUTPATIENT
Start: 2023-05-09 | End: 2023-05-09 | Stop reason: SURG

## 2023-05-09 RX ORDER — LIDOCAINE HYDROCHLORIDE 10 MG/ML
0.5 INJECTION, SOLUTION INFILTRATION; PERINEURAL ONCE AS NEEDED
Status: DISCONTINUED | OUTPATIENT
Start: 2023-05-09 | End: 2023-05-09 | Stop reason: HOSPADM

## 2023-05-09 RX ORDER — PROPOFOL 10 MG/ML
VIAL (ML) INTRAVENOUS AS NEEDED
Status: DISCONTINUED | OUTPATIENT
Start: 2023-05-09 | End: 2023-05-09 | Stop reason: SURG

## 2023-05-09 RX ORDER — SODIUM CHLORIDE, SODIUM LACTATE, POTASSIUM CHLORIDE, CALCIUM CHLORIDE 600; 310; 30; 20 MG/100ML; MG/100ML; MG/100ML; MG/100ML
1000 INJECTION, SOLUTION INTRAVENOUS CONTINUOUS
Status: DISCONTINUED | OUTPATIENT
Start: 2023-05-09 | End: 2023-05-09 | Stop reason: HOSPADM

## 2023-05-09 RX ORDER — SODIUM CHLORIDE, SODIUM LACTATE, POTASSIUM CHLORIDE, CALCIUM CHLORIDE 600; 310; 30; 20 MG/100ML; MG/100ML; MG/100ML; MG/100ML
INJECTION, SOLUTION INTRAVENOUS CONTINUOUS PRN
Status: DISCONTINUED | OUTPATIENT
Start: 2023-05-09 | End: 2023-05-09 | Stop reason: SURG

## 2023-05-09 RX ADMIN — PROPOFOL 120 MG: 10 INJECTION, EMULSION INTRAVENOUS at 12:27

## 2023-05-09 RX ADMIN — PROPOFOL INJECTABLE EMULSION 180 MCG/KG/MIN: 10 INJECTION, EMULSION INTRAVENOUS at 12:27

## 2023-05-09 RX ADMIN — LIDOCAINE HYDROCHLORIDE 30 MG: 20 INJECTION, SOLUTION INFILTRATION; PERINEURAL at 12:27

## 2023-05-09 RX ADMIN — GLYCOPYRROLATE 0.2 MG: 0.2 INJECTION, SOLUTION INTRAMUSCULAR; INTRAVITREAL at 12:27

## 2023-05-09 RX ADMIN — SODIUM CHLORIDE, SODIUM LACTATE, POTASSIUM CHLORIDE, AND CALCIUM CHLORIDE: .6; .31; .03; .02 INJECTION, SOLUTION INTRAVENOUS at 12:21

## 2023-05-09 RX ADMIN — SODIUM CHLORIDE, POTASSIUM CHLORIDE, SODIUM LACTATE AND CALCIUM CHLORIDE 1000 ML: 600; 310; 30; 20 INJECTION, SOLUTION INTRAVENOUS at 11:33

## 2023-05-09 NOTE — H&P
Patient Care Team:  Cynthia Reid MD as PCP - General  Cynthia Reid MD as PCP - Family Medicine  Shaka Corrigan MD as Consulting Physician (Obstetrics and Gynecology)    CHIEF COMPLAINT: Screening CRC    HISTORY OF PRESENT ILLNESS:  Last exam was 2012    Past Medical History:   Diagnosis Date   • Acute bronchitis    • Acute pain    • ADHD (attention deficit hyperactivity disorder) 1988    years ago. worse w/ job type change   • Allergic 1968    since i was little   • Blood tests for routine general physical examination    • Cataract 2020    Cararact surgery march 23rd & 30th, 2023   • Cholelithiasis 2000    resolved with GB removal   • Diarrhea    • Glaucoma 2018    treated w/ eye drops   • Headache 1968    ocular and allergy induced   • History of medical problems dry eye dx 2022    Eye drops.  tear duct plugs Feb10, 2023   • Hyperlipidemia 2016    noted on past lab work   • Low back pain 1993    occasional chronic   • Lung nodule 10/18/2016    Stable 10/16-10/18   • Microscopic hematuria    • Obesity     at times   • Urinary tract infection     years ago   • Visual impairment 1965    wear contacts/glasses     Past Surgical History:   Procedure Laterality Date   • ABDOMINOPLASTY     • BREAST SURGERY      Enlargement Procedure   • CHOLECYSTECTOMY     • COLONOSCOPY  2014   • COSMETIC SURGERY      breast aug 1989 and 2015/16   • DIAGNOSTIC LAPAROSCOPY EXPLORATORY LAPAROTOMY     • DILATATION AND CURETTAGE     • EPIDURAL BLOCK     • EYE SURGERY  1996    RK 1996   • OTHER SURGICAL HISTORY      Vaginal Sling Operation for Stress Incontinence   • TUBAL ABDOMINAL LIGATION  1988   • WRIST SURGERY       Family History   Problem Relation Age of Onset   • Breast cancer Sister    • Bipolar disorder Brother    • Diabetes Brother         type 2 DM   • Diabetes Brother    • Alcohol abuse Father         D. 2008   • Cancer Sister         Breast ca 2011   • Diabetes Maternal Grandfather         DX in his 80's   • Stroke  Maternal Grandfather         in his 90's   • Early death Brother          in sleep at 48-   • Hearing loss Mother         L ear   • Miscarriages / Stillbirths Mother         child number 4   • Vision loss Mother         cataracts replaced     Social History     Tobacco Use   • Smoking status: Never   • Smokeless tobacco: Never   • Tobacco comments:     none   Substance Use Topics   • Alcohol use: Yes     Alcohol/week: 4.0 standard drinks     Types: 2 Glasses of wine, 2 Drinks containing 0.5 oz of alcohol per week     Comment: usually one or other on a weekend night   • Drug use: No     Medications Prior to Admission   Medication Sig Dispense Refill Last Dose   • amphetamine-dextroamphetamine XR (Adderall XR) 10 MG 24 hr capsule Take 1 capsule by mouth Daily Before Lunch 30 capsule 0 2023   • amphetamine-dextroamphetamine XR (Adderall XR) 15 MG 24 hr capsule Take 2 capsules by mouth Every Morning 60 capsule 0 2023   • atorvastatin (LIPITOR) 20 MG tablet Take 1 tablet by mouth Daily. 90 tablet 3 2023   • bimatoprost (Lumigan) 0.01 % ophthalmic drops Administer 1 drop to both eyes Every Night. 2.5 mL 6 2023   • brimonidine (ALPHAGAN) 0.2 % ophthalmic solution Administer 1 drop to both eyes 2 (Two) Times a Day. 10 mL 6 2023   • Calcium Carbonate-Vitamin D (CALCIUM + D PO) Take by mouth.   Past Week   • dorzolamide-timolol (COSOPT) 22.3-6.8 MG/ML ophthalmic solution Administer 1 drop to the right eye 2 (Two) Times a Day. 10 mL 11 2023   • estradiol (MINIVELLE, VIVELLE-DOT) 0.05 MG/24HR patch Apply 1 (one) Patch twice weekly 24 patch 3 2023   • estradiol (Vagifem) 10 MCG tablet vaginal tablet Insert 1 (one) tablet intravaginally twice weekly 24 tablet 3 Past Week   • Prednisolon-Gatiflox-Bromfenac 1-0.5-0.075 % suspension place 1 drop four times a day in right eye starting 3 days prior to surgery then follow surgical instructions 8 mL 3 2023   • prednisoLONE acetate (PRED FORTE) 1 %  "ophthalmic suspension Place 1 Drop in both eyes 4 times a day for 1 week, then 1 drop three times a day for 1 week, then 1 drop twice a day  for 1 week , then 1 drop every day for 1 week then stop 10 mL 3 5/9/2023   • Progesterone (PROMETRIUM) 200 MG capsule Take 1 (one) capsule by mouth once daily 90 capsule 3 5/8/2023   • tiZANidine (ZANAFLEX) 4 MG tablet Take 1 tablet by mouth Every 6 (Six) Hours As Needed for Muscle Spasms. 30 tablet 5 5/8/2023   • HYDROcodone-acetaminophen (NORCO)  MG per tablet Take 1 tablet by mouth Every 6 (Six) Hours As Needed for Moderate Pain. 60 tablet 0 More than a month     Allergies:  Contrast dye (echo or unknown ct/mr), Pistachio nut (diagnostic), Codeine, Morphine and related, and Sulfa antibiotics    REVIEW OF SYSTEMS:  Please see the above history of present illness for pertinent positives and negatives.  The remainder of the patient's systems have been reviewed and are negative.     Vital Signs  Temp:  [88 °F (31.1 °C)] 88 °F (31.1 °C)  Heart Rate:  [88] 88  Resp:  [16] 16  BP: (98)/(49) 98/49    Flowsheet Rows    Flowsheet Row First Filed Value   Admission Height 175.3 cm (69\") Documented at 05/06/2023 0943   Admission Weight 72.6 kg (160 lb) Documented at 05/06/2023 0943           Physical Exam:  Physical Exam   Constitutional: Patient appears well-developed and well-nourished and in no acute distress   HEENT:   Head: Normocephalic and atraumatic.   Eyes:  Pupils are equal, round, and reactive to light. EOM are intact. Sclerae are anicteric and non-injected.  Mouth and Throat: Patient has moist mucous membranes. Oropharynx is clear of any erythema or exudate.     Neck: Neck supple. No JVD present. No thyromegaly present. No lymphadenopathy present.  Cardiovascular: Regular rate, regular rhythm, S1 normal and S2 normal.  Exam reveals no gallop and no friction rub.  No murmur heard.  Pulmonary/Chest: Lungs are clear to auscultation bilaterally. No respiratory distress. No " wheezes. No rhonchi. No rales.   Abdominal: Soft. Bowel sounds are normal. No distension and no mass. There is no hepatosplenomegaly. There is no tenderness.   Musculoskeletal: Normal Muscle tone  Extremities: No edema. Pulses are palpable in all 4 extremities.  Neurological: Patient is alert and oriented to person, place, and time. Cranial nerves II-XII are grossly intact with no focal deficits.  Skin: Skin is warm. No rash noted. Nails show no clubbing.  No cyanosis or erythema.    Debilities/Disabilities Identified: None  Emotional Behavior: Appropriate     Results Review:   I reviewed the patient's new clinical results.    Lab Results (most recent)     None          Imaging Results (Most Recent)     None        reviewed    ECG/EMG Results (most recent)     None        reviewed    Assessment & Plan   Screening CRC/  colonoscopy      I discussed the patient's findings and my recommendations with patient.     Walter Tidwell MD  05/09/23  11:50 EDT    Time: 10 min prior to procedure.

## 2023-05-09 NOTE — BRIEF OP NOTE
COLONOSCOPY  Progress Note    Angely FRANCO Mey  5/9/2023    Pre-op Diagnosis:   Screening for malignant neoplasm of colon [Z12.11]       Post-Op Diagnosis Codes:     * Screening for malignant neoplasm of colon [Z12.11]    Procedure/CPT® Codes:        Procedure(s):  COLONOSCOPY TO CECUM        Surgeon(s):  Walter Tidwell MD    Anesthesia: Monitored Anesthesia Care    Staff:   Endo Technician: Karina Caicedo RN  Endo Nurse: Samira Wallis RN         Estimated Blood Loss: none    Urine Voided: * No values recorded between 5/9/2023 12:21 PM and 5/9/2023 12:48 PM *    Specimens:                None          Drains: * No LDAs found *    Findings: Colon to TI good Prep  Normal Mucosa Thru-out        Complications: None          Walter Tidwell MD     Date: 5/9/2023  Time: 12:49 EDT

## 2023-05-09 NOTE — ANESTHESIA PREPROCEDURE EVALUATION
Anesthesia Evaluation     Patient summary reviewed and Nursing notes reviewed   NPO Solid Status: > 6 hours  NPO Liquid Status: > 6 hours           Airway   Mallampati: II  TM distance: >3 FB  Neck ROM: full  Dental - normal exam     Pulmonary    (-) COPD, asthma, sleep apnea, not a smoker  Cardiovascular     (+) hyperlipidemia,   (-) CAD, dysrhythmias, angina      Neuro/Psych  (+) headaches, numbness, psychiatric history,    (-) seizures, CVA  GI/Hepatic/Renal/Endo    (-) GERD, diabetes    Musculoskeletal     (+) neck pain,   Abdominal    Substance History      OB/GYN          Other                      Anesthesia Plan    ASA 2     MAC       Anesthetic plan, risks, benefits, and alternatives have been provided, discussed and informed consent has been obtained with: patient.        CODE STATUS:

## 2023-05-09 NOTE — ANESTHESIA POSTPROCEDURE EVALUATION
Patient: Angely Mathias    Procedure Summary     Date: 05/09/23 Room / Location: [facility]    Anesthesia Start: 1221 Anesthesia Stop: 1251    Procedure: COLONOSCOPY TO CECUM Diagnosis:       Screening for malignant neoplasm of colon      (Screening for malignant neoplasm of colon [Z12.11])    Surgeons: Wlater Tidwell MD Provider: Modesto Hobson MD    Anesthesia Type: MAC ASA Status: 2          Anesthesia Type: MAC    Vitals  Vitals Value Taken Time   BP 99/61 05/09/23 1317   Temp 36.6 °C (97.8 °F) 05/09/23 1252   Pulse 77 05/09/23 1317   Resp 16 05/09/23 1317   SpO2 98 % 05/09/23 1317           Post Anesthesia Care and Evaluation    Level of consciousness: awake  Pain management: satisfactory to patient    Airway patency: patent  Anesthetic complications: No anesthetic complications  PONV Status: controlled  Cardiovascular status: acceptable  Respiratory status: acceptable  Hydration status: acceptable

## 2023-05-22 DIAGNOSIS — F90.0 ATTENTION DEFICIT HYPERACTIVITY DISORDER (ADHD), PREDOMINANTLY INATTENTIVE TYPE: ICD-10-CM

## 2023-05-22 DIAGNOSIS — F90.0 ATTENTION DEFICIT HYPERACTIVITY DISORDER (ADHD), PREDOMINANTLY INATTENTIVE TYPE: Primary | ICD-10-CM

## 2023-05-22 RX ORDER — DEXTROAMPHETAMINE SACCHARATE, AMPHETAMINE ASPARTATE MONOHYDRATE, DEXTROAMPHETAMINE SULFATE AND AMPHETAMINE SULFATE 2.5; 2.5; 2.5; 2.5 MG/1; MG/1; MG/1; MG/1
10 CAPSULE, EXTENDED RELEASE ORAL
Qty: 30 CAPSULE | Refills: 0 | Status: SHIPPED | OUTPATIENT
Start: 2023-05-22 | End: 2023-05-22

## 2023-05-22 RX ORDER — DEXTROAMPHETAMINE SACCHARATE, AMPHETAMINE ASPARTATE MONOHYDRATE, DEXTROAMPHETAMINE SULFATE AND AMPHETAMINE SULFATE 3.75; 3.75; 3.75; 3.75 MG/1; MG/1; MG/1; MG/1
30 CAPSULE, EXTENDED RELEASE ORAL EVERY MORNING
Qty: 60 CAPSULE | Refills: 0 | Status: SHIPPED | OUTPATIENT
Start: 2023-05-22 | End: 2023-05-22

## 2023-05-22 RX ORDER — DEXTROAMPHETAMINE SACCHARATE, AMPHETAMINE ASPARTATE, DEXTROAMPHETAMINE SULFATE AND AMPHETAMINE SULFATE 3.75; 3.75; 3.75; 3.75 MG/1; MG/1; MG/1; MG/1
15 TABLET ORAL 2 TIMES DAILY
Qty: 60 TABLET | Refills: 0 | Status: SHIPPED | OUTPATIENT
Start: 2023-05-22

## 2023-07-24 DIAGNOSIS — F90.0 ATTENTION DEFICIT HYPERACTIVITY DISORDER (ADHD), PREDOMINANTLY INATTENTIVE TYPE: Primary | ICD-10-CM

## 2023-07-24 RX ORDER — DEXTROAMPHETAMINE SACCHARATE, AMPHETAMINE ASPARTATE MONOHYDRATE, DEXTROAMPHETAMINE SULFATE AND AMPHETAMINE SULFATE 7.5; 7.5; 7.5; 7.5 MG/1; MG/1; MG/1; MG/1
30 CAPSULE, EXTENDED RELEASE ORAL EVERY MORNING
Qty: 30 CAPSULE | Refills: 0 | Status: SHIPPED | OUTPATIENT
Start: 2023-07-24

## 2023-07-24 RX ORDER — DEXTROAMPHETAMINE SACCHARATE, AMPHETAMINE ASPARTATE MONOHYDRATE, DEXTROAMPHETAMINE SULFATE AND AMPHETAMINE SULFATE 2.5; 2.5; 2.5; 2.5 MG/1; MG/1; MG/1; MG/1
10 CAPSULE, EXTENDED RELEASE ORAL EVERY MORNING
Qty: 30 CAPSULE | Refills: 0 | Status: SHIPPED | OUTPATIENT
Start: 2023-07-24

## 2023-08-23 DIAGNOSIS — F90.0 ATTENTION DEFICIT HYPERACTIVITY DISORDER (ADHD), PREDOMINANTLY INATTENTIVE TYPE: ICD-10-CM

## 2023-08-23 RX ORDER — DEXTROAMPHETAMINE SACCHARATE, AMPHETAMINE ASPARTATE MONOHYDRATE, DEXTROAMPHETAMINE SULFATE AND AMPHETAMINE SULFATE 2.5; 2.5; 2.5; 2.5 MG/1; MG/1; MG/1; MG/1
10 CAPSULE, EXTENDED RELEASE ORAL EVERY MORNING
Qty: 30 CAPSULE | Refills: 0 | Status: SHIPPED | OUTPATIENT
Start: 2023-08-23

## 2023-08-23 RX ORDER — DEXTROAMPHETAMINE SACCHARATE, AMPHETAMINE ASPARTATE MONOHYDRATE, DEXTROAMPHETAMINE SULFATE AND AMPHETAMINE SULFATE 7.5; 7.5; 7.5; 7.5 MG/1; MG/1; MG/1; MG/1
30 CAPSULE, EXTENDED RELEASE ORAL EVERY MORNING
Qty: 30 CAPSULE | Refills: 0 | Status: SHIPPED | OUTPATIENT
Start: 2023-08-23

## 2023-08-28 DIAGNOSIS — M54.40 CHRONIC BILATERAL LOW BACK PAIN WITH SCIATICA, SCIATICA LATERALITY UNSPECIFIED: ICD-10-CM

## 2023-08-28 DIAGNOSIS — G89.29 CHRONIC BILATERAL LOW BACK PAIN WITH SCIATICA, SCIATICA LATERALITY UNSPECIFIED: ICD-10-CM

## 2023-08-29 RX ORDER — HYDROCODONE BITARTRATE AND ACETAMINOPHEN 10; 325 MG/1; MG/1
1 TABLET ORAL EVERY 6 HOURS PRN
Qty: 60 TABLET | Refills: 0 | Status: SHIPPED | OUTPATIENT
Start: 2023-08-29

## 2023-09-13 RX ORDER — TIZANIDINE 4 MG/1
4 TABLET ORAL EVERY 6 HOURS PRN
Qty: 90 TABLET | Refills: 0 | Status: SHIPPED | OUTPATIENT
Start: 2023-09-13

## 2023-09-22 ENCOUNTER — TELEPHONE (OUTPATIENT)
Dept: INTERNAL MEDICINE | Facility: CLINIC | Age: 63
End: 2023-09-22
Payer: COMMERCIAL

## 2023-09-22 DIAGNOSIS — F90.0 ATTENTION DEFICIT HYPERACTIVITY DISORDER (ADHD), PREDOMINANTLY INATTENTIVE TYPE: ICD-10-CM

## 2023-09-22 RX ORDER — DEXTROAMPHETAMINE SACCHARATE, AMPHETAMINE ASPARTATE MONOHYDRATE, DEXTROAMPHETAMINE SULFATE AND AMPHETAMINE SULFATE 7.5; 7.5; 7.5; 7.5 MG/1; MG/1; MG/1; MG/1
30 CAPSULE, EXTENDED RELEASE ORAL EVERY MORNING
Qty: 30 CAPSULE | Refills: 0 | Status: SHIPPED | OUTPATIENT
Start: 2023-09-22

## 2023-09-22 RX ORDER — DEXTROAMPHETAMINE SACCHARATE, AMPHETAMINE ASPARTATE MONOHYDRATE, DEXTROAMPHETAMINE SULFATE AND AMPHETAMINE SULFATE 2.5; 2.5; 2.5; 2.5 MG/1; MG/1; MG/1; MG/1
10 CAPSULE, EXTENDED RELEASE ORAL EVERY MORNING
Qty: 30 CAPSULE | Refills: 0 | Status: SHIPPED | OUTPATIENT
Start: 2023-09-22

## 2023-09-22 NOTE — TELEPHONE ENCOUNTER
----- Message from Angely Mathias sent at 9/22/2023 11:56 AM EDT -----  Regarding: Adderal xr  Contact: [phone]  Good afternoon,  Would you please send my Adderall xr scripts to [facility] as they currently have the 30mg xr and 10mg xr capsules.[phone]  Thank you very much  Angely

## 2023-09-26 RX ORDER — TIZANIDINE 4 MG/1
4 TABLET ORAL EVERY 6 HOURS PRN
Qty: 30 TABLET | Refills: 5 | Status: SHIPPED | OUTPATIENT
Start: 2023-09-26

## 2023-10-04 DIAGNOSIS — Z00.00 HEALTH MAINTENANCE EXAMINATION: Primary | ICD-10-CM

## 2023-10-10 DIAGNOSIS — E78.5 HYPERLIPIDEMIA, UNSPECIFIED HYPERLIPIDEMIA TYPE: Primary | ICD-10-CM

## 2023-10-23 RX ORDER — TIZANIDINE 4 MG/1
4 TABLET ORAL EVERY 6 HOURS PRN
Qty: 90 TABLET | Refills: 0 | Status: SHIPPED | OUTPATIENT
Start: 2023-10-23

## 2023-10-24 DIAGNOSIS — F90.0 ATTENTION DEFICIT HYPERACTIVITY DISORDER (ADHD), PREDOMINANTLY INATTENTIVE TYPE: ICD-10-CM

## 2023-10-24 RX ORDER — DEXTROAMPHETAMINE SACCHARATE, AMPHETAMINE ASPARTATE MONOHYDRATE, DEXTROAMPHETAMINE SULFATE AND AMPHETAMINE SULFATE 2.5; 2.5; 2.5; 2.5 MG/1; MG/1; MG/1; MG/1
10 CAPSULE, EXTENDED RELEASE ORAL EVERY MORNING
Qty: 30 CAPSULE | Refills: 0 | Status: SHIPPED | OUTPATIENT
Start: 2023-10-24

## 2023-10-24 RX ORDER — DEXTROAMPHETAMINE SACCHARATE, AMPHETAMINE ASPARTATE MONOHYDRATE, DEXTROAMPHETAMINE SULFATE AND AMPHETAMINE SULFATE 7.5; 7.5; 7.5; 7.5 MG/1; MG/1; MG/1; MG/1
30 CAPSULE, EXTENDED RELEASE ORAL EVERY MORNING
Qty: 30 CAPSULE | Refills: 0 | Status: SHIPPED | OUTPATIENT
Start: 2023-10-24

## 2023-11-22 DIAGNOSIS — F90.0 ATTENTION DEFICIT HYPERACTIVITY DISORDER (ADHD), PREDOMINANTLY INATTENTIVE TYPE: ICD-10-CM

## 2023-11-22 RX ORDER — DEXTROAMPHETAMINE SACCHARATE, AMPHETAMINE ASPARTATE MONOHYDRATE, DEXTROAMPHETAMINE SULFATE AND AMPHETAMINE SULFATE 7.5; 7.5; 7.5; 7.5 MG/1; MG/1; MG/1; MG/1
30 CAPSULE, EXTENDED RELEASE ORAL EVERY MORNING
Qty: 30 CAPSULE | Refills: 0 | Status: SHIPPED | OUTPATIENT
Start: 2023-11-22 | End: 2023-11-22 | Stop reason: SDUPTHER

## 2023-11-22 RX ORDER — DEXTROAMPHETAMINE SACCHARATE, AMPHETAMINE ASPARTATE MONOHYDRATE, DEXTROAMPHETAMINE SULFATE AND AMPHETAMINE SULFATE 7.5; 7.5; 7.5; 7.5 MG/1; MG/1; MG/1; MG/1
30 CAPSULE, EXTENDED RELEASE ORAL EVERY MORNING
Qty: 30 CAPSULE | Refills: 0 | Status: SHIPPED | OUTPATIENT
Start: 2023-11-22

## 2023-11-22 RX ORDER — DEXTROAMPHETAMINE SACCHARATE, AMPHETAMINE ASPARTATE MONOHYDRATE, DEXTROAMPHETAMINE SULFATE AND AMPHETAMINE SULFATE 2.5; 2.5; 2.5; 2.5 MG/1; MG/1; MG/1; MG/1
10 CAPSULE, EXTENDED RELEASE ORAL EVERY MORNING
Qty: 30 CAPSULE | Refills: 0 | Status: SHIPPED | OUTPATIENT
Start: 2023-11-22

## 2023-11-29 RX ORDER — TIZANIDINE 4 MG/1
4 TABLET ORAL EVERY 6 HOURS PRN
Qty: 90 TABLET | Refills: 0 | Status: SHIPPED | OUTPATIENT
Start: 2023-11-29

## 2023-12-11 RX ORDER — ATORVASTATIN CALCIUM 20 MG/1
20 TABLET, FILM COATED ORAL DAILY
Qty: 90 TABLET | Refills: 3 | Status: SHIPPED | OUTPATIENT
Start: 2023-12-11

## 2023-12-21 DIAGNOSIS — F90.0 ATTENTION DEFICIT HYPERACTIVITY DISORDER (ADHD), PREDOMINANTLY INATTENTIVE TYPE: ICD-10-CM

## 2023-12-21 RX ORDER — DEXTROAMPHETAMINE SACCHARATE, AMPHETAMINE ASPARTATE MONOHYDRATE, DEXTROAMPHETAMINE SULFATE AND AMPHETAMINE SULFATE 7.5; 7.5; 7.5; 7.5 MG/1; MG/1; MG/1; MG/1
30 CAPSULE, EXTENDED RELEASE ORAL EVERY MORNING
Qty: 30 CAPSULE | Refills: 0 | Status: SHIPPED | OUTPATIENT
Start: 2023-12-21

## 2023-12-21 RX ORDER — DEXTROAMPHETAMINE SACCHARATE, AMPHETAMINE ASPARTATE MONOHYDRATE, DEXTROAMPHETAMINE SULFATE AND AMPHETAMINE SULFATE 2.5; 2.5; 2.5; 2.5 MG/1; MG/1; MG/1; MG/1
10 CAPSULE, EXTENDED RELEASE ORAL EVERY MORNING
Qty: 30 CAPSULE | Refills: 0 | Status: SHIPPED | OUTPATIENT
Start: 2023-12-21

## 2024-02-05 DIAGNOSIS — F90.0 ATTENTION DEFICIT HYPERACTIVITY DISORDER (ADHD), PREDOMINANTLY INATTENTIVE TYPE: ICD-10-CM

## 2024-02-05 RX ORDER — DEXTROAMPHETAMINE SACCHARATE, AMPHETAMINE ASPARTATE MONOHYDRATE, DEXTROAMPHETAMINE SULFATE AND AMPHETAMINE SULFATE 2.5; 2.5; 2.5; 2.5 MG/1; MG/1; MG/1; MG/1
10 CAPSULE, EXTENDED RELEASE ORAL EVERY MORNING
Qty: 30 CAPSULE | Refills: 0 | Status: SHIPPED | OUTPATIENT
Start: 2024-02-05

## 2024-02-05 RX ORDER — DEXTROAMPHETAMINE SACCHARATE, AMPHETAMINE ASPARTATE MONOHYDRATE, DEXTROAMPHETAMINE SULFATE AND AMPHETAMINE SULFATE 7.5; 7.5; 7.5; 7.5 MG/1; MG/1; MG/1; MG/1
30 CAPSULE, EXTENDED RELEASE ORAL EVERY MORNING
Qty: 30 CAPSULE | Refills: 0 | Status: SHIPPED | OUTPATIENT
Start: 2024-02-05

## 2024-02-29 RX ORDER — SCOLOPAMINE TRANSDERMAL SYSTEM 1 MG/1
1 PATCH, EXTENDED RELEASE TRANSDERMAL
Qty: 4 PATCH | Refills: 1 | Status: SHIPPED | OUTPATIENT
Start: 2024-02-29

## 2024-03-04 DIAGNOSIS — F90.0 ATTENTION DEFICIT HYPERACTIVITY DISORDER (ADHD), PREDOMINANTLY INATTENTIVE TYPE: ICD-10-CM

## 2024-03-04 RX ORDER — DEXTROAMPHETAMINE SACCHARATE, AMPHETAMINE ASPARTATE MONOHYDRATE, DEXTROAMPHETAMINE SULFATE AND AMPHETAMINE SULFATE 7.5; 7.5; 7.5; 7.5 MG/1; MG/1; MG/1; MG/1
30 CAPSULE, EXTENDED RELEASE ORAL EVERY MORNING
Qty: 30 CAPSULE | Refills: 0 | Status: SHIPPED | OUTPATIENT
Start: 2024-03-04

## 2024-03-04 RX ORDER — DEXTROAMPHETAMINE SACCHARATE, AMPHETAMINE ASPARTATE MONOHYDRATE, DEXTROAMPHETAMINE SULFATE AND AMPHETAMINE SULFATE 2.5; 2.5; 2.5; 2.5 MG/1; MG/1; MG/1; MG/1
10 CAPSULE, EXTENDED RELEASE ORAL EVERY MORNING
Qty: 30 CAPSULE | Refills: 0 | Status: SHIPPED | OUTPATIENT
Start: 2024-03-04

## 2024-03-13 RX ORDER — TIZANIDINE 4 MG/1
4 TABLET ORAL EVERY 6 HOURS PRN
Qty: 90 TABLET | Refills: 0 | Status: SHIPPED | OUTPATIENT
Start: 2024-03-13

## 2024-04-05 DIAGNOSIS — E78.5 HYPERLIPIDEMIA, UNSPECIFIED HYPERLIPIDEMIA TYPE: Primary | ICD-10-CM

## 2024-04-08 DIAGNOSIS — F90.0 ATTENTION DEFICIT HYPERACTIVITY DISORDER (ADHD), PREDOMINANTLY INATTENTIVE TYPE: ICD-10-CM

## 2024-04-08 RX ORDER — DEXTROAMPHETAMINE SACCHARATE, AMPHETAMINE ASPARTATE MONOHYDRATE, DEXTROAMPHETAMINE SULFATE AND AMPHETAMINE SULFATE 2.5; 2.5; 2.5; 2.5 MG/1; MG/1; MG/1; MG/1
10 CAPSULE, EXTENDED RELEASE ORAL EVERY MORNING
Qty: 30 CAPSULE | Refills: 0 | Status: SHIPPED | OUTPATIENT
Start: 2024-04-08

## 2024-04-08 RX ORDER — DEXTROAMPHETAMINE SACCHARATE, AMPHETAMINE ASPARTATE MONOHYDRATE, DEXTROAMPHETAMINE SULFATE AND AMPHETAMINE SULFATE 7.5; 7.5; 7.5; 7.5 MG/1; MG/1; MG/1; MG/1
30 CAPSULE, EXTENDED RELEASE ORAL EVERY MORNING
Qty: 30 CAPSULE | Refills: 0 | Status: SHIPPED | OUTPATIENT
Start: 2024-04-08

## 2024-04-09 LAB
ALBUMIN SERPL-MCNC: 4.3 G/DL (ref 3.5–5.2)
ALBUMIN/GLOB SERPL: 2 G/DL
ALP SERPL-CCNC: 75 U/L (ref 39–117)
ALT SERPL-CCNC: 33 U/L (ref 1–33)
APPEARANCE UR: CLEAR
AST SERPL-CCNC: 27 U/L (ref 1–32)
BACTERIA #/AREA URNS HPF: NORMAL /HPF
BASOPHILS # BLD AUTO: 0.04 10*3/MM3 (ref 0–0.2)
BASOPHILS NFR BLD AUTO: 0.8 % (ref 0–1.5)
BILIRUB SERPL-MCNC: 0.4 MG/DL (ref 0–1.2)
BILIRUB UR QL STRIP: NEGATIVE
BUN SERPL-MCNC: 19 MG/DL (ref 8–23)
BUN/CREAT SERPL: 22.1 (ref 7–25)
CALCIUM SERPL-MCNC: 9.5 MG/DL (ref 8.6–10.5)
CASTS URNS MICRO: NORMAL
CHLORIDE SERPL-SCNC: 102 MMOL/L (ref 98–107)
CHOLEST SERPL-MCNC: 140 MG/DL (ref 0–200)
CO2 SERPL-SCNC: 27.8 MMOL/L (ref 22–29)
COLOR UR: YELLOW
CREAT SERPL-MCNC: 0.86 MG/DL (ref 0.57–1)
EGFRCR SERPLBLD CKD-EPI 2021: 75.1 ML/MIN/1.73
EOSINOPHIL # BLD AUTO: 0.13 10*3/MM3 (ref 0–0.4)
EOSINOPHIL NFR BLD AUTO: 2.7 % (ref 0.3–6.2)
EPI CELLS #/AREA URNS HPF: NORMAL /HPF
ERYTHROCYTE [DISTWIDTH] IN BLOOD BY AUTOMATED COUNT: 12.2 % (ref 12.3–15.4)
GLOBULIN SER CALC-MCNC: 2.1 GM/DL
GLUCOSE SERPL-MCNC: 97 MG/DL (ref 65–99)
GLUCOSE UR QL STRIP: NEGATIVE
HCT VFR BLD AUTO: 40.4 % (ref 34–46.6)
HDLC SERPL-MCNC: 60 MG/DL (ref 40–60)
HGB BLD-MCNC: 13.2 G/DL (ref 12–15.9)
HGB UR QL STRIP: ABNORMAL
IMM GRANULOCYTES # BLD AUTO: 0.01 10*3/MM3 (ref 0–0.05)
IMM GRANULOCYTES NFR BLD AUTO: 0.2 % (ref 0–0.5)
KETONES UR QL STRIP: NEGATIVE
LDLC SERPL CALC-MCNC: 70 MG/DL (ref 0–100)
LEUKOCYTE ESTERASE UR QL STRIP: NEGATIVE
LYMPHOCYTES # BLD AUTO: 1.69 10*3/MM3 (ref 0.7–3.1)
LYMPHOCYTES NFR BLD AUTO: 35.3 % (ref 19.6–45.3)
MCH RBC QN AUTO: 30.6 PG (ref 26.6–33)
MCHC RBC AUTO-ENTMCNC: 32.7 G/DL (ref 31.5–35.7)
MCV RBC AUTO: 93.7 FL (ref 79–97)
MONOCYTES # BLD AUTO: 0.48 10*3/MM3 (ref 0.1–0.9)
MONOCYTES NFR BLD AUTO: 10 % (ref 5–12)
NEUTROPHILS # BLD AUTO: 2.44 10*3/MM3 (ref 1.7–7)
NEUTROPHILS NFR BLD AUTO: 51 % (ref 42.7–76)
NITRITE UR QL STRIP: NEGATIVE
NRBC BLD AUTO-RTO: 0 /100 WBC (ref 0–0.2)
PH UR STRIP: 7 [PH] (ref 5–8)
PLATELET # BLD AUTO: 290 10*3/MM3 (ref 140–450)
POTASSIUM SERPL-SCNC: 4.3 MMOL/L (ref 3.5–5.2)
PROT SERPL-MCNC: 6.4 G/DL (ref 6–8.5)
PROT UR QL STRIP: NEGATIVE
RBC # BLD AUTO: 4.31 10*6/MM3 (ref 3.77–5.28)
RBC #/AREA URNS HPF: NORMAL /HPF
SODIUM SERPL-SCNC: 138 MMOL/L (ref 136–145)
SP GR UR STRIP: 1.02 (ref 1–1.03)
TRIGL SERPL-MCNC: 41 MG/DL (ref 0–150)
TSH SERPL DL<=0.005 MIU/L-ACNC: 1.63 UIU/ML (ref 0.27–4.2)
UROBILINOGEN UR STRIP-MCNC: ABNORMAL MG/DL
VLDLC SERPL CALC-MCNC: 10 MG/DL (ref 5–40)
WBC # BLD AUTO: 4.79 10*3/MM3 (ref 3.4–10.8)
WBC #/AREA URNS HPF: NORMAL /HPF

## 2024-04-12 ENCOUNTER — OFFICE VISIT (OUTPATIENT)
Dept: INTERNAL MEDICINE | Facility: CLINIC | Age: 64
End: 2024-04-12
Payer: MEDICARE

## 2024-04-12 VITALS
HEART RATE: 76 BPM | WEIGHT: 174 LBS | OXYGEN SATURATION: 98 % | HEIGHT: 69 IN | DIASTOLIC BLOOD PRESSURE: 80 MMHG | SYSTOLIC BLOOD PRESSURE: 110 MMHG | BODY MASS INDEX: 25.77 KG/M2

## 2024-04-12 DIAGNOSIS — M54.2 NECK PAIN: ICD-10-CM

## 2024-04-12 DIAGNOSIS — G89.29 CHRONIC BILATERAL LOW BACK PAIN WITH SCIATICA, SCIATICA LATERALITY UNSPECIFIED: ICD-10-CM

## 2024-04-12 DIAGNOSIS — F90.0 ATTENTION DEFICIT HYPERACTIVITY DISORDER (ADHD), PREDOMINANTLY INATTENTIVE TYPE: ICD-10-CM

## 2024-04-12 DIAGNOSIS — M54.40 CHRONIC BILATERAL LOW BACK PAIN WITH SCIATICA, SCIATICA LATERALITY UNSPECIFIED: ICD-10-CM

## 2024-04-12 DIAGNOSIS — E78.5 HYPERLIPIDEMIA, UNSPECIFIED HYPERLIPIDEMIA TYPE: ICD-10-CM

## 2024-04-12 DIAGNOSIS — Z00.00 WELCOME TO MEDICARE PREVENTIVE VISIT: Primary | ICD-10-CM

## 2024-04-12 PROBLEM — H26.9 CATARACTS, BILATERAL: Status: ACTIVE | Noted: 2024-04-12

## 2024-04-12 NOTE — PATIENT INSTRUCTIONS
Medicare Wellness  Personal Prevention Plan of Service     Date of Office Visit:    Encounter Provider:  Cynthia Reid MD  Place of Service:  [facility]  Patient Name: Angely Mathias  :  1960    As part of the Medicare Wellness portion of your visit today, we are providing you with this personalized preventive plan of services (PPPS). This plan is based upon recommendations of the [place] Preventive Services Task Force (USPSTF) and the Advisory Committee on Immunization Practices (ACIP).    This lists the preventive care services that should be considered, and provides dates of when you are due. Items listed as completed are up-to-date and do not require any further intervention.    Health Maintenance   Topic Date Due    ANNUAL WELLNESS VISIT  Never done    RSV Vaccine - Adults (1 - 1-dose 60+ series) Never done    PAP SMEAR  2021    BMI FOLLOWUP  2022    DXA SCAN  2022    Pneumococcal Vaccine 65+ (1 of 1 - PCV) Never done    COVID-19 Vaccine (4 - - season) 2023    ZOSTER VACCINE (2 of 2) 2024    TDAP/TD VACCINES (2 - Td or Tdap) 2024    MAMMOGRAM  2024    INFLUENZA VACCINE  2024    LIPID PANEL  2025    COLORECTAL CANCER SCREENING  2033    HEPATITIS C SCREENING  Completed       No orders of the defined types were placed in this encounter.      No follow-ups on file.

## 2024-04-12 NOTE — PROGRESS NOTES
The ABCs of the Annual Wellness Visit  [place] to Medicare Visit    Subjective     Angely Mathias is a 63 y.o. female who presents for a  Welcome to Medicare Visit.    The following portions of the patient's history were reviewed and   updated as appropriate: allergies, current medications, past family history, past medical history, past social history, past surgical history, and problem list.     Compared to one year ago, the patient feels her physical   health is the same.    Compared to one year ago, the patient feels her mental   health is the same.    Recent Hospitalizations:  She was not admitted to the hospital during the last year.       Current Medical Providers:  Patient Care Team:  Cynthia Reid MD as PCP - General  Cynthia Reid MD as PCP - Family Medicine  ShekharShaka MD as Consulting Physician (Obstetrics and Gynecology)    Outpatient Medications Prior to Visit   Medication Sig Dispense Refill    amphetamine-dextroamphetamine XR (Adderall XR) 10 MG 24 hr capsule Take 1 capsule by mouth Every Morning 30 capsule 0    amphetamine-dextroamphetamine XR (Adderall XR) 30 MG 24 hr capsule Take 1 capsule by mouth Every Morning 30 capsule 0    atorvastatin (LIPITOR) 20 MG tablet Take 1 tablet by mouth Daily. 90 tablet 3    bimatoprost (Lumigan) 0.01 % ophthalmic drops Administer 1 drop to both eyes Every Night. 2.5 mL 6    brimonidine (ALPHAGAN) 0.2 % ophthalmic solution Administer 1 drop to both eyes 2 (Two) Times a Day. 10 mL 6    Calcium Carbonate-Vitamin D (CALCIUM + D PO) Take by mouth.      dorzolamide-timolol (COSOPT) 22.3-6.8 MG/ML ophthalmic solution Administer 1 drop to the right eye 2 (Two) Times a Day. 10 mL 11    estradiol (MINIVELLE, VIVELLE-DOT) 0.05 MG/24HR patch Place 1 patch on the skin as directed by provider 2 (Two) Times a Week. 24 patch 0    estradiol (Vagifem) 10 MCG tablet vaginal tablet Insert 1 (one) tablet intravaginally twice weekly 24 tablet 3     "HYDROcodone-acetaminophen (NORCO)  MG per tablet Take 1 tablet by mouth Every 6 (Six) Hours As Needed for Moderate Pain. 60 tablet 0    Progesterone (PROMETRIUM) 200 MG capsule Take 1 (one) capsule by mouth once daily 90 capsule 3    tiZANidine (ZANAFLEX) 4 MG tablet TAKE 1 TABLET BY MOUTH EVERY 6 HOURS AS NEEDED FOR MUSCLE SPASM 90 tablet 0    estradiol (MINIVELLE, VIVELLE-DOT) 0.05 MG/24HR patch Apply 1 (one) Patch twice weekly 24 patch 3    Scopolamine 1 MG/3DAYS patch Place 1 patch on the skin as directed by provider Every 72 (Seventy-Two) Hours. 4 patch 1     No facility-administered medications prior to visit.       Opioid medication/s are on active medication list.  and I have evaluated her active treatment plan and pain score trends (see table).  Vitals:    04/12/24 1002   PainSc: 0-No pain     I have reviewed the chart for potential of high risk medication and harmful drug interactions in the elderly.          Aspirin is not on active medication list.  Aspirin use is not indicated based on review of current medical condition/s. Risk of harm outweighs potential benefits.  .    Patient Active Problem List   Diagnosis    Atopic rhinitis    Hyperlipidemia    Chronic low back pain    Neck pain    Attention deficit hyperactivity disorder (ADHD), predominantly inattentive type    Other specified glaucoma    Intervertebral disc disorder with radiculopathy of lumbar region    History of adenomatous polyp of colon    Cataracts, bilateral     Advance Care Planning   Advance Care Planning     Advance Directive is not on file.  ACP discussion was held with the patient during this visit. Patient has an advance directive (not in EMR), copy requested.       Objective   Vitals:    04/12/24 1002   BP: 110/80   Pulse: 76   SpO2: 98%   Weight: 78.9 kg (174 lb)   Height: 175.3 cm (69.02\")   PainSc: 0-No pain     Estimated body mass index is 25.68 kg/m² as calculated from the following:    Height as of this encounter: " "175.3 cm (69.02\").    Weight as of this encounter: 78.9 kg (174 lb).    BMI is >= 25 and <30. (Overweight) The following options were offered after discussion;: information on healthy weight added to patient's after visit summary       Does the patient have evidence of cognitive impairment?   No    Lab Results   Component Value Date    CHLPL 140 2024    TRIG 41 2024    HDL 60 2024    LDL 70 2024    VLDL 10 2024         ECG 12 Lead    Date/Time: 2024 12:25 PM  Performed by: Cynthia Reid MD    Authorized by: Cynthia Reid MD  Comparison: compared with previous ECG from 2018  Similar to previous ECG  Rhythm: sinus rhythm  Rate: normal  Conduction: conduction normal  ST Segments: ST segments normal  T Waves: T waves normal  QRS axis: normal    Clinical impression: normal ECG             HEALTH RISK ASSESSMENT    Smoking Status:  Social History     Tobacco Use   Smoking Status Never   Smokeless Tobacco Never   Tobacco Comments    none     Alcohol Consumption:  Social History     Substance and Sexual Activity   Alcohol Use Yes    Alcohol/week: 4.0 standard drinks of alcohol    Types: 2 Glasses of wine, 2 Drinks containing 0.5 oz of alcohol per week    Comment: usually one or other on a weekend night       Fall Risk Screen:    STEADI Fall Risk Assessment was completed, and patient is at LOW risk for falls.Assessment completed on:2024    Depression Screen:       2024    10:00 AM   PHQ-2/PHQ-9 Depression Screening   Little Interest or Pleasure in Doing Things 0-->not at all   Feeling Down, Depressed or Hopeless 0-->not at all   PHQ-9: Brief Depression Severity Measure Score 0       Health Habits and Functional and Cognitive Screenin/12/2024    10:01 AM   Functional & Cognitive Status   Do you have difficulty preparing food and eating? No   Do you have difficulty bathing yourself, getting dressed or grooming yourself? No   Do you have difficulty using the " toilet? No   Do you have difficulty moving around from place to place? No   Do you have trouble with steps or getting out of a bed or a chair? No   Current Diet Well Balanced Diet   Dental Exam Up to date   Eye Exam Up to date   Exercise (times per week) 0 times per week   Current Exercises Include No Regular Exercise   Do you need help using the phone?  No   Are you deaf or do you have serious difficulty hearing?  No   Do you need help to go to places out of walking distance? No   Do you need help shopping? No   Do you need help preparing meals?  No   Do you need help with housework?  No   Do you need help with laundry? No   Do you need help taking your medications? No   Do you need help managing money? No   Do you ever drive or ride in a car without wearing a seat belt? No   Have you felt unusual stress, anger or loneliness in the last month? No   Who do you live with? Spouse   If you need help, do you have trouble finding someone available to you? No   Have you been bothered in the last four weeks by sexual problems? No   Do you have difficulty concentrating, remembering or making decisions? No       Visual Acuity:    No results found.    Age-appropriate Screening Schedule:  Refer to the list below for future screening recommendations based on patient's age, sex and/or medical conditions. Orders for these recommended tests are listed in the plan section. The patient has been provided with a written plan.    Health Maintenance   Topic Date Due    ANNUAL WELLNESS VISIT  Never done    RSV Vaccine - Adults (1 - 1-dose 60+ series) Never done    PAP SMEAR  09/01/2021    DXA SCAN  11/20/2022    Pneumococcal Vaccine 65+ (1 of 1 - PCV) Never done    COVID-19 Vaccine (4 - 2023-24 season) 09/01/2023    ZOSTER VACCINE (2 of 2) 01/19/2024    TDAP/TD VACCINES (2 - Td or Tdap) 01/24/2024    MAMMOGRAM  04/01/2024    INFLUENZA VACCINE  08/01/2024    LIPID PANEL  04/08/2025    BMI FOLLOWUP  04/12/2025    COLORECTAL CANCER  "SCREENING  05/09/2033    HEPATITIS C SCREENING  Completed        CMS Preventative Services Quick Reference  Risk Factors Identified During Encounter    Immunizations Discussed/Encouraged: Tdap, Prevnar 20 (Pneumococcal 20-valent conjugate), Shingrix, COVID19, and RSV (Respiratory Syncytial Virus)  The above risks/problems have been discussed with the patient.  Pertinent information has been shared with the patient in the After Visit Summary.    Follow Up:   Initial Medicare Visit in one year    An After Visit Summary and PPPS were made available to the patient.      Additional E&M Note during same encounter follows:  Patient has multiple medical problems which are significant and separately identifiable that require additional work above and beyond the Medicare Wellness Visit.      Chief Complaint  Welcome To Medicare    Subjective        HPI  Angely Mathias is also being seen today for     HLD.  Reviewed labs.  Good control.      Review of Systems   Respiratory: Negative.     Cardiovascular: Negative.        Objective   Vital Signs:  /80   Pulse 76   Ht 175.3 cm (69.02\")   Wt 78.9 kg (174 lb)   SpO2 98%   BMI 25.68 kg/m²     Physical Exam  Constitutional:       Appearance: She is well-developed.   HENT:      Head: Normocephalic and atraumatic.      Right Ear: Hearing, tympanic membrane and external ear normal.      Left Ear: Hearing, tympanic membrane and external ear normal.      Nose: Nose normal.   Neck:      Thyroid: No thyromegaly.   Cardiovascular:      Rate and Rhythm: Normal rate and regular rhythm.      Heart sounds: Normal heart sounds. No murmur heard.  Pulmonary:      Effort: Pulmonary effort is normal.      Breath sounds: Normal breath sounds.   Chest:   Breasts:     Right: Normal.      Left: Normal.   Abdominal:      General: There is no distension.      Palpations: Abdomen is soft.      Tenderness: There is no abdominal tenderness.   Musculoskeletal:      Cervical back: Neck supple. "   Lymphadenopathy:      Cervical: No cervical adenopathy.   Skin:     General: Skin is warm and dry.   Neurological:      Mental Status: She is alert and oriented to person, place, and time.   Psychiatric:         Speech: Speech normal.         Behavior: Behavior normal.         Thought Content: Thought content normal.         Judgment: Judgment normal.          The following data was reviewed by: Cynthia Reid MD on 04/12/2024:  Common labs          4/8/2024    10:07   Common Labs   Glucose 97    BUN 19    Creatinine 0.86    Sodium 138    Potassium 4.3    Chloride 102    Calcium 9.5    Total Protein 6.4    Albumin 4.3    Total Bilirubin 0.4    Alkaline Phosphatase 75    AST (SGOT) 27    ALT (SGPT) 33    WBC 4.79    Hemoglobin 13.2    Hematocrit 40.4    Platelets 290    Total Cholesterol 140    Triglycerides 41    HDL Cholesterol 60    LDL Cholesterol  70             Assessment and Plan   Diagnoses and all orders for this visit:    1. Welcome to Medicare preventive visit (Primary)    2. Hyperlipidemia, unspecified hyperlipidemia type    3. Attention deficit hyperactivity disorder (ADHD), predominantly inattentive type    4. Chronic bilateral low back pain with sciatica, sciatica laterality unspecified    Other orders  -     Pneumococcal Conjugate Vaccine 20-Valent (PCV20)    HLD.  Control is good.  The patient is advised to continue current dosage of atorvastatin.    ADD.  Control is good.  The patient is advised to continue current dosage of Adderall.  Update contract.  Oniel is reviewed.    Chronic low back pain and neck pain.  Control is good.  Update contract for rare use of hydrocodone.                 Follow Up   Return in about 1 year (around 4/12/2025) for Annual physical.  Patient was given instructions and counseling regarding her condition or for health maintenance advice. Please see specific information pulled into the AVS if appropriate.

## 2024-05-06 DIAGNOSIS — F90.0 ATTENTION DEFICIT HYPERACTIVITY DISORDER (ADHD), PREDOMINANTLY INATTENTIVE TYPE: ICD-10-CM

## 2024-05-06 RX ORDER — DEXTROAMPHETAMINE SACCHARATE, AMPHETAMINE ASPARTATE MONOHYDRATE, DEXTROAMPHETAMINE SULFATE AND AMPHETAMINE SULFATE 2.5; 2.5; 2.5; 2.5 MG/1; MG/1; MG/1; MG/1
10 CAPSULE, EXTENDED RELEASE ORAL EVERY MORNING
Qty: 30 CAPSULE | Refills: 0 | Status: SHIPPED | OUTPATIENT
Start: 2024-05-06

## 2024-05-06 RX ORDER — DEXTROAMPHETAMINE SACCHARATE, AMPHETAMINE ASPARTATE MONOHYDRATE, DEXTROAMPHETAMINE SULFATE AND AMPHETAMINE SULFATE 7.5; 7.5; 7.5; 7.5 MG/1; MG/1; MG/1; MG/1
30 CAPSULE, EXTENDED RELEASE ORAL EVERY MORNING
Qty: 30 CAPSULE | Refills: 0 | Status: SHIPPED | OUTPATIENT
Start: 2024-05-06

## 2024-05-22 RX ORDER — TIZANIDINE 4 MG/1
4 TABLET ORAL EVERY 6 HOURS PRN
Qty: 90 TABLET | Refills: 0 | Status: SHIPPED | OUTPATIENT
Start: 2024-05-22

## 2024-06-05 ENCOUNTER — TRANSCRIBE ORDERS (OUTPATIENT)
Dept: ADMINISTRATIVE | Facility: HOSPITAL | Age: 64
End: 2024-06-05
Payer: MEDICARE

## 2024-06-05 DIAGNOSIS — Z13.6 ENCOUNTER FOR SCREENING FOR VASCULAR DISEASE: Primary | ICD-10-CM

## 2024-06-10 DIAGNOSIS — F90.0 ATTENTION DEFICIT HYPERACTIVITY DISORDER (ADHD), PREDOMINANTLY INATTENTIVE TYPE: ICD-10-CM

## 2024-06-10 RX ORDER — DEXTROAMPHETAMINE SACCHARATE, AMPHETAMINE ASPARTATE MONOHYDRATE, DEXTROAMPHETAMINE SULFATE AND AMPHETAMINE SULFATE 7.5; 7.5; 7.5; 7.5 MG/1; MG/1; MG/1; MG/1
30 CAPSULE, EXTENDED RELEASE ORAL EVERY MORNING
Qty: 30 CAPSULE | Refills: 0 | Status: SHIPPED | OUTPATIENT
Start: 2024-06-10

## 2024-06-10 RX ORDER — DEXTROAMPHETAMINE SACCHARATE, AMPHETAMINE ASPARTATE MONOHYDRATE, DEXTROAMPHETAMINE SULFATE AND AMPHETAMINE SULFATE 2.5; 2.5; 2.5; 2.5 MG/1; MG/1; MG/1; MG/1
10 CAPSULE, EXTENDED RELEASE ORAL EVERY MORNING
Qty: 30 CAPSULE | Refills: 0 | Status: SHIPPED | OUTPATIENT
Start: 2024-06-10

## 2024-07-08 DIAGNOSIS — F90.0 ATTENTION DEFICIT HYPERACTIVITY DISORDER (ADHD), PREDOMINANTLY INATTENTIVE TYPE: ICD-10-CM

## 2024-07-08 RX ORDER — DEXTROAMPHETAMINE SACCHARATE, AMPHETAMINE ASPARTATE MONOHYDRATE, DEXTROAMPHETAMINE SULFATE AND AMPHETAMINE SULFATE 2.5; 2.5; 2.5; 2.5 MG/1; MG/1; MG/1; MG/1
10 CAPSULE, EXTENDED RELEASE ORAL EVERY MORNING
Qty: 30 CAPSULE | Refills: 0 | Status: SHIPPED | OUTPATIENT
Start: 2024-07-08

## 2024-07-08 RX ORDER — DEXTROAMPHETAMINE SACCHARATE, AMPHETAMINE ASPARTATE MONOHYDRATE, DEXTROAMPHETAMINE SULFATE AND AMPHETAMINE SULFATE 7.5; 7.5; 7.5; 7.5 MG/1; MG/1; MG/1; MG/1
30 CAPSULE, EXTENDED RELEASE ORAL EVERY MORNING
Qty: 30 CAPSULE | Refills: 0 | Status: SHIPPED | OUTPATIENT
Start: 2024-07-08

## 2024-08-07 DIAGNOSIS — F90.0 ATTENTION DEFICIT HYPERACTIVITY DISORDER (ADHD), PREDOMINANTLY INATTENTIVE TYPE: ICD-10-CM

## 2024-08-07 RX ORDER — DEXTROAMPHETAMINE SACCHARATE, AMPHETAMINE ASPARTATE MONOHYDRATE, DEXTROAMPHETAMINE SULFATE AND AMPHETAMINE SULFATE 7.5; 7.5; 7.5; 7.5 MG/1; MG/1; MG/1; MG/1
30 CAPSULE, EXTENDED RELEASE ORAL EVERY MORNING
Qty: 30 CAPSULE | Refills: 0 | Status: SHIPPED | OUTPATIENT
Start: 2024-08-07

## 2024-08-07 RX ORDER — DEXTROAMPHETAMINE SACCHARATE, AMPHETAMINE ASPARTATE MONOHYDRATE, DEXTROAMPHETAMINE SULFATE AND AMPHETAMINE SULFATE 2.5; 2.5; 2.5; 2.5 MG/1; MG/1; MG/1; MG/1
10 CAPSULE, EXTENDED RELEASE ORAL EVERY MORNING
Qty: 30 CAPSULE | Refills: 0 | Status: SHIPPED | OUTPATIENT
Start: 2024-08-07

## 2024-08-19 RX ORDER — TIZANIDINE 4 MG/1
4 TABLET ORAL EVERY 6 HOURS PRN
Qty: 90 TABLET | Refills: 0 | Status: SHIPPED | OUTPATIENT
Start: 2024-08-19

## 2024-09-10 DIAGNOSIS — F90.0 ATTENTION DEFICIT HYPERACTIVITY DISORDER (ADHD), PREDOMINANTLY INATTENTIVE TYPE: ICD-10-CM

## 2024-09-10 RX ORDER — DEXTROAMPHETAMINE SACCHARATE, AMPHETAMINE ASPARTATE MONOHYDRATE, DEXTROAMPHETAMINE SULFATE AND AMPHETAMINE SULFATE 2.5; 2.5; 2.5; 2.5 MG/1; MG/1; MG/1; MG/1
10 CAPSULE, EXTENDED RELEASE ORAL EVERY MORNING
Qty: 30 CAPSULE | Refills: 0 | Status: SHIPPED | OUTPATIENT
Start: 2024-09-10

## 2024-09-10 RX ORDER — DEXTROAMPHETAMINE SACCHARATE, AMPHETAMINE ASPARTATE MONOHYDRATE, DEXTROAMPHETAMINE SULFATE AND AMPHETAMINE SULFATE 7.5; 7.5; 7.5; 7.5 MG/1; MG/1; MG/1; MG/1
30 CAPSULE, EXTENDED RELEASE ORAL EVERY MORNING
Qty: 30 CAPSULE | Refills: 0 | Status: SHIPPED | OUTPATIENT
Start: 2024-09-10

## 2024-10-11 DIAGNOSIS — F90.0 ATTENTION DEFICIT HYPERACTIVITY DISORDER (ADHD), PREDOMINANTLY INATTENTIVE TYPE: ICD-10-CM

## 2024-10-11 RX ORDER — DEXTROAMPHETAMINE SACCHARATE, AMPHETAMINE ASPARTATE MONOHYDRATE, DEXTROAMPHETAMINE SULFATE AND AMPHETAMINE SULFATE 2.5; 2.5; 2.5; 2.5 MG/1; MG/1; MG/1; MG/1
10 CAPSULE, EXTENDED RELEASE ORAL EVERY MORNING
Qty: 30 CAPSULE | Refills: 0 | Status: SHIPPED | OUTPATIENT
Start: 2024-10-11

## 2024-10-11 RX ORDER — DEXTROAMPHETAMINE SACCHARATE, AMPHETAMINE ASPARTATE MONOHYDRATE, DEXTROAMPHETAMINE SULFATE AND AMPHETAMINE SULFATE 7.5; 7.5; 7.5; 7.5 MG/1; MG/1; MG/1; MG/1
30 CAPSULE, EXTENDED RELEASE ORAL EVERY MORNING
Qty: 30 CAPSULE | Refills: 0 | Status: SHIPPED | OUTPATIENT
Start: 2024-10-11

## 2024-11-11 DIAGNOSIS — F90.0 ATTENTION DEFICIT HYPERACTIVITY DISORDER (ADHD), PREDOMINANTLY INATTENTIVE TYPE: ICD-10-CM

## 2024-11-11 RX ORDER — DEXTROAMPHETAMINE SACCHARATE, AMPHETAMINE ASPARTATE MONOHYDRATE, DEXTROAMPHETAMINE SULFATE AND AMPHETAMINE SULFATE 2.5; 2.5; 2.5; 2.5 MG/1; MG/1; MG/1; MG/1
10 CAPSULE, EXTENDED RELEASE ORAL EVERY MORNING
Qty: 30 CAPSULE | Refills: 0 | Status: SHIPPED | OUTPATIENT
Start: 2024-11-11

## 2024-11-11 RX ORDER — DEXTROAMPHETAMINE SACCHARATE, AMPHETAMINE ASPARTATE MONOHYDRATE, DEXTROAMPHETAMINE SULFATE AND AMPHETAMINE SULFATE 7.5; 7.5; 7.5; 7.5 MG/1; MG/1; MG/1; MG/1
30 CAPSULE, EXTENDED RELEASE ORAL EVERY MORNING
Qty: 30 CAPSULE | Refills: 0 | Status: SHIPPED | OUTPATIENT
Start: 2024-11-11

## 2024-11-15 DIAGNOSIS — G89.29 CHRONIC BILATERAL LOW BACK PAIN WITH SCIATICA, SCIATICA LATERALITY UNSPECIFIED: ICD-10-CM

## 2024-11-15 DIAGNOSIS — M54.40 CHRONIC BILATERAL LOW BACK PAIN WITH SCIATICA, SCIATICA LATERALITY UNSPECIFIED: ICD-10-CM

## 2024-11-18 RX ORDER — HYDROCODONE BITARTRATE AND ACETAMINOPHEN 10; 325 MG/1; MG/1
1 TABLET ORAL EVERY 6 HOURS PRN
Qty: 60 TABLET | Refills: 0 | Status: SHIPPED | OUTPATIENT
Start: 2024-11-18

## 2024-11-19 RX ORDER — ATORVASTATIN CALCIUM 20 MG/1
20 TABLET, FILM COATED ORAL DAILY
Qty: 90 TABLET | Refills: 0 | Status: SHIPPED | OUTPATIENT
Start: 2024-11-19

## 2024-12-11 DIAGNOSIS — F90.0 ATTENTION DEFICIT HYPERACTIVITY DISORDER (ADHD), PREDOMINANTLY INATTENTIVE TYPE: ICD-10-CM

## 2024-12-11 RX ORDER — DEXTROAMPHETAMINE SACCHARATE, AMPHETAMINE ASPARTATE MONOHYDRATE, DEXTROAMPHETAMINE SULFATE AND AMPHETAMINE SULFATE 7.5; 7.5; 7.5; 7.5 MG/1; MG/1; MG/1; MG/1
30 CAPSULE, EXTENDED RELEASE ORAL EVERY MORNING
Qty: 30 CAPSULE | Refills: 0 | Status: SHIPPED | OUTPATIENT
Start: 2024-12-11

## 2024-12-11 RX ORDER — DEXTROAMPHETAMINE SACCHARATE, AMPHETAMINE ASPARTATE MONOHYDRATE, DEXTROAMPHETAMINE SULFATE AND AMPHETAMINE SULFATE 2.5; 2.5; 2.5; 2.5 MG/1; MG/1; MG/1; MG/1
10 CAPSULE, EXTENDED RELEASE ORAL EVERY MORNING
Qty: 30 CAPSULE | Refills: 0 | Status: SHIPPED | OUTPATIENT
Start: 2024-12-11

## 2025-01-20 DIAGNOSIS — F90.0 ATTENTION DEFICIT HYPERACTIVITY DISORDER (ADHD), PREDOMINANTLY INATTENTIVE TYPE: ICD-10-CM

## 2025-01-20 RX ORDER — DEXTROAMPHETAMINE SACCHARATE, AMPHETAMINE ASPARTATE MONOHYDRATE, DEXTROAMPHETAMINE SULFATE AND AMPHETAMINE SULFATE 2.5; 2.5; 2.5; 2.5 MG/1; MG/1; MG/1; MG/1
10 CAPSULE, EXTENDED RELEASE ORAL EVERY MORNING
Qty: 30 CAPSULE | Refills: 0 | Status: SHIPPED | OUTPATIENT
Start: 2025-01-20

## 2025-01-20 RX ORDER — DEXTROAMPHETAMINE SACCHARATE, AMPHETAMINE ASPARTATE MONOHYDRATE, DEXTROAMPHETAMINE SULFATE AND AMPHETAMINE SULFATE 7.5; 7.5; 7.5; 7.5 MG/1; MG/1; MG/1; MG/1
30 CAPSULE, EXTENDED RELEASE ORAL EVERY MORNING
Qty: 30 CAPSULE | Refills: 0 | Status: SHIPPED | OUTPATIENT
Start: 2025-01-20

## 2025-02-03 ENCOUNTER — OFFICE VISIT (OUTPATIENT)
Dept: INTERNAL MEDICINE | Facility: CLINIC | Age: 65
End: 2025-02-03
Payer: MEDICARE

## 2025-02-03 VITALS
RESPIRATION RATE: 12 BRPM | HEART RATE: 93 BPM | SYSTOLIC BLOOD PRESSURE: 110 MMHG | DIASTOLIC BLOOD PRESSURE: 74 MMHG | WEIGHT: 179.8 LBS | HEIGHT: 69 IN | BODY MASS INDEX: 26.63 KG/M2 | TEMPERATURE: 98.1 F | OXYGEN SATURATION: 98 %

## 2025-02-03 DIAGNOSIS — J01.90 ACUTE SINUSITIS, RECURRENCE NOT SPECIFIED, UNSPECIFIED LOCATION: Primary | ICD-10-CM

## 2025-02-03 DIAGNOSIS — J20.8 ACUTE BRONCHITIS DUE TO OTHER SPECIFIED ORGANISMS: ICD-10-CM

## 2025-02-03 DIAGNOSIS — H66.90 CHRONIC OTITIS MEDIA, UNSPECIFIED OTITIS MEDIA TYPE: ICD-10-CM

## 2025-02-03 LAB
EXPIRATION DATE: NORMAL
FLUAV AG UPPER RESP QL IA.RAPID: NOT DETECTED
FLUBV AG UPPER RESP QL IA.RAPID: NOT DETECTED
INTERNAL CONTROL: NORMAL
Lab: NORMAL
SARS-COV-2 AG UPPER RESP QL IA.RAPID: NOT DETECTED

## 2025-02-03 PROCEDURE — 1159F MED LIST DOCD IN RCRD: CPT | Performed by: INTERNAL MEDICINE

## 2025-02-03 PROCEDURE — 99213 OFFICE O/P EST LOW 20 MIN: CPT | Performed by: INTERNAL MEDICINE

## 2025-02-03 PROCEDURE — 1126F AMNT PAIN NOTED NONE PRSNT: CPT | Performed by: INTERNAL MEDICINE

## 2025-02-03 PROCEDURE — 87428 SARSCOV & INF VIR A&B AG IA: CPT | Performed by: INTERNAL MEDICINE

## 2025-02-03 PROCEDURE — 1160F RVW MEDS BY RX/DR IN RCRD: CPT | Performed by: INTERNAL MEDICINE

## 2025-02-03 RX ORDER — HYDROCODONE POLISTIREX AND CHLORPHENIRAMINE POLISTIREX 10; 8 MG/5ML; MG/5ML
5 SUSPENSION, EXTENDED RELEASE ORAL EVERY 12 HOURS PRN
Qty: 118 ML | Refills: 0 | Status: SHIPPED | OUTPATIENT
Start: 2025-02-03

## 2025-02-03 NOTE — PROGRESS NOTES
Subjective     Angely Mathias is a 64 y.o. female who presents with   Chief Complaint   Patient presents with    Sinus Problem     Pt, C/o Sinus pressure in head and facial sinus pressure. Cough turned in to a drainage cough. Both ears are popping        Sinus Problem  Associated symptoms include congestion and coughing.        URI for five days.  Cough and congestion.  Head and nasal congestion.  Teeth hurt.  Sinuses are painful.  Ears hurt.      Review of Systems   HENT:  Positive for congestion and sinus pain.    Respiratory:  Positive for cough.        The following portions of the patient's history were reviewed and updated as appropriate: allergies, current medications and problem list.    Patient Active Problem List    Diagnosis Date Noted    Cataracts, bilateral 04/12/2024    History of adenomatous polyp of colon 05/13/2022     Note Last Updated: 5/13/2022     Added automatically from request for surgery [id number]      Intervertebral disc disorder with radiculopathy of lumbar region 02/19/2020    Other specified glaucoma 06/11/2019    Attention deficit hyperactivity disorder (ADHD), predominantly inattentive type 10/28/2016     Note Last Updated: 10/28/2016     Neuropsych testing in 10/2016 with [facility].        Atopic rhinitis 02/08/2016    Hyperlipidemia 02/08/2016     Note Last Updated: 11/28/2018     Description: 1/2014.  10 year risk ASCVD was 1.7 %; 2.1% 10 year risk.        Chronic low back pain 02/08/2016     Note Last Updated: 2/8/2016     Description: chronic LBP.  Uses hydrocodone three times a week      Neck pain 02/08/2016       Current Outpatient Medications on File Prior to Visit   Medication Sig Dispense Refill    amphetamine-dextroamphetamine XR (Adderall XR) 10 MG 24 hr capsule Take 1 capsule by mouth Every Morning 30 capsule 0    amphetamine-dextroamphetamine XR (Adderall XR) 30 MG 24 hr capsule Take 1 capsule by mouth Every Morning 30 capsule 0    atorvastatin (LIPITOR) 20 MG  "tablet Take 1 tablet by mouth once daily 90 tablet 0    bimatoprost (Lumigan) 0.01 % ophthalmic drops Administer 1 drop to both eyes Every Night. 2.5 mL 6    brimonidine (ALPHAGAN) 0.2 % ophthalmic solution Administer 1 drop to both eyes 2 (Two) Times a Day. 10 mL 6    Calcium Carbonate-Vitamin D (CALCIUM + D PO) Take by mouth.      dorzolamide-timolol (COSOPT) 22.3-6.8 MG/ML ophthalmic solution Administer 1 drop to the right eye 2 (Two) Times a Day. 10 mL 11    estradiol (MINIVELLE, VIVELLE-DOT) 0.05 MG/24HR patch Place 1 patch on the skin as directed by provider 2 (Two) Times a Week. 24 patch 0    estradiol (Vagifem) 10 MCG tablet vaginal tablet Insert 1 (one) tablet intravaginally twice weekly 24 tablet 3    HYDROcodone-acetaminophen (NORCO)  MG per tablet Take 1 tablet by mouth Every 6 (Six) Hours As Needed for Moderate Pain. 60 tablet 0    Progesterone (PROMETRIUM) 200 MG capsule Take 1 (one) capsule by mouth once daily 90 capsule 3    tiZANidine (ZANAFLEX) 4 MG tablet Take 1 tablet by mouth Every 6 (Six) Hours As Needed for Muscle Spasms. 90 tablet 0     No current facility-administered medications on file prior to visit.       Objective     /74 (BP Location: Left arm, Patient Position: Sitting, Cuff Size: Adult)   Pulse 93   Temp 98.1 °F (36.7 °C) (Oral)   Resp 12   Ht 175.3 cm (69\")   Wt 81.6 kg (179 lb 12.8 oz)   SpO2 98%   BMI 26.55 kg/m²     Physical Exam  Constitutional:       Appearance: She is well-developed.   HENT:      Head: Normocephalic and atraumatic.      Right Ear: Hearing normal. A middle ear effusion is present. Tympanic membrane is injected.      Left Ear: Hearing normal. A middle ear effusion is present. Tympanic membrane is injected.      Mouth/Throat:      Pharynx: No oropharyngeal exudate or posterior oropharyngeal erythema.   Cardiovascular:      Rate and Rhythm: Normal rate and regular rhythm.      Heart sounds: Normal heart sounds.   Pulmonary:      Effort: " Pulmonary effort is normal.      Breath sounds: Normal breath sounds.   Skin:     General: Skin is warm and dry.   Neurological:      Mental Status: She is alert and oriented to person, place, and time.   Psychiatric:         Behavior: Behavior normal.         Assessment & Plan   Diagnoses and all orders for this visit:    1. Acute sinusitis, recurrence not specified, unspecified location (Primary)    2. Acute bronchitis due to other specified organisms  -     Hydrocod Dioni-Chlorphe Dioni ER (TUSSIONEX PENNKINETIC) 10-8 MG/5ML ER suspension; Take 5 mL by mouth Every 12 (Twelve) Hours As Needed for Cough.  Dispense: 118 mL; Refill: 0    3. Chronic otitis media, unspecified otitis media type    Other orders  -     amoxicillin-clavulanate (AUGMENTIN) 875-125 MG per tablet; Take 1 tablet by mouth 2 (Two) Times a Day for 10 days.  Dispense: 20 tablet; Refill: 0        Discussion    Patient presents with an acute sinus infection, otitis and bronchitis.  She is covid and flu negative.  Rx for antibiotics are called in.  The patient is encouraged to take with OTC Mucinex D.  Let me know if not feeling better over the next 3 days or if there is any change in symptoms.           Future Appointments   Date Time Provider Department Center   4/16/2025  9:10 AM [facility]   4/22/2025 10:00 AM Cynthia Reid MD [facility]

## 2025-02-12 RX ORDER — ATORVASTATIN CALCIUM 20 MG/1
20 TABLET, FILM COATED ORAL DAILY
Qty: 90 TABLET | Refills: 0 | Status: SHIPPED | OUTPATIENT
Start: 2025-02-12

## 2025-02-24 DIAGNOSIS — F90.0 ATTENTION DEFICIT HYPERACTIVITY DISORDER (ADHD), PREDOMINANTLY INATTENTIVE TYPE: ICD-10-CM

## 2025-02-24 RX ORDER — DEXTROAMPHETAMINE SACCHARATE, AMPHETAMINE ASPARTATE MONOHYDRATE, DEXTROAMPHETAMINE SULFATE AND AMPHETAMINE SULFATE 7.5; 7.5; 7.5; 7.5 MG/1; MG/1; MG/1; MG/1
30 CAPSULE, EXTENDED RELEASE ORAL EVERY MORNING
Qty: 30 CAPSULE | Refills: 0 | Status: SHIPPED | OUTPATIENT
Start: 2025-02-24

## 2025-02-24 RX ORDER — DEXTROAMPHETAMINE SACCHARATE, AMPHETAMINE ASPARTATE MONOHYDRATE, DEXTROAMPHETAMINE SULFATE AND AMPHETAMINE SULFATE 2.5; 2.5; 2.5; 2.5 MG/1; MG/1; MG/1; MG/1
10 CAPSULE, EXTENDED RELEASE ORAL EVERY MORNING
Qty: 30 CAPSULE | Refills: 0 | Status: SHIPPED | OUTPATIENT
Start: 2025-02-24

## 2025-03-26 DIAGNOSIS — F90.0 ATTENTION DEFICIT HYPERACTIVITY DISORDER (ADHD), PREDOMINANTLY INATTENTIVE TYPE: ICD-10-CM

## 2025-03-26 RX ORDER — DEXTROAMPHETAMINE SACCHARATE, AMPHETAMINE ASPARTATE MONOHYDRATE, DEXTROAMPHETAMINE SULFATE AND AMPHETAMINE SULFATE 2.5; 2.5; 2.5; 2.5 MG/1; MG/1; MG/1; MG/1
10 CAPSULE, EXTENDED RELEASE ORAL EVERY MORNING
Qty: 30 CAPSULE | Refills: 0 | Status: SHIPPED | OUTPATIENT
Start: 2025-03-26

## 2025-03-26 RX ORDER — DEXTROAMPHETAMINE SACCHARATE, AMPHETAMINE ASPARTATE MONOHYDRATE, DEXTROAMPHETAMINE SULFATE AND AMPHETAMINE SULFATE 7.5; 7.5; 7.5; 7.5 MG/1; MG/1; MG/1; MG/1
30 CAPSULE, EXTENDED RELEASE ORAL EVERY MORNING
Qty: 30 CAPSULE | Refills: 0 | Status: SHIPPED | OUTPATIENT
Start: 2025-03-26

## 2025-04-15 DIAGNOSIS — E78.5 HYPERLIPIDEMIA, UNSPECIFIED HYPERLIPIDEMIA TYPE: Primary | ICD-10-CM

## 2025-04-15 DIAGNOSIS — Z79.899 HIGH RISK MEDICATION USE: ICD-10-CM

## 2025-04-16 LAB
ALBUMIN SERPL-MCNC: 4.4 G/DL (ref 3.5–5.2)
ALBUMIN/GLOB SERPL: 1.9 G/DL
ALP SERPL-CCNC: 82 U/L (ref 39–117)
ALT SERPL-CCNC: 17 U/L (ref 1–33)
APPEARANCE UR: CLEAR
AST SERPL-CCNC: 23 U/L (ref 1–32)
BACTERIA #/AREA URNS HPF: ABNORMAL /HPF
BASOPHILS # BLD AUTO: 0.06 10*3/MM3 (ref 0–0.2)
BASOPHILS NFR BLD AUTO: 1.1 % (ref 0–1.5)
BILIRUB SERPL-MCNC: 0.4 MG/DL (ref 0–1.2)
BILIRUB UR QL STRIP: NEGATIVE
BUN SERPL-MCNC: 13 MG/DL (ref 8–23)
BUN/CREAT SERPL: 15.3 (ref 7–25)
CALCIUM SERPL-MCNC: 9.7 MG/DL (ref 8.6–10.5)
CASTS URNS MICRO: ABNORMAL
CHLORIDE SERPL-SCNC: 105 MMOL/L (ref 98–107)
CHOLEST SERPL-MCNC: 164 MG/DL (ref 0–200)
CO2 SERPL-SCNC: 26.8 MMOL/L (ref 22–29)
COLOR UR: YELLOW
CREAT SERPL-MCNC: 0.85 MG/DL (ref 0.57–1)
EGFRCR SERPLBLD CKD-EPI 2021: 75.7 ML/MIN/1.73
EOSINOPHIL # BLD AUTO: 0.18 10*3/MM3 (ref 0–0.4)
EOSINOPHIL NFR BLD AUTO: 3.4 % (ref 0.3–6.2)
EPI CELLS #/AREA URNS HPF: ABNORMAL /HPF
ERYTHROCYTE [DISTWIDTH] IN BLOOD BY AUTOMATED COUNT: 11.9 % (ref 12.3–15.4)
GLOBULIN SER CALC-MCNC: 2.3 GM/DL
GLUCOSE SERPL-MCNC: 98 MG/DL (ref 65–99)
GLUCOSE UR QL STRIP: NEGATIVE
HCT VFR BLD AUTO: 39.7 % (ref 34–46.6)
HDLC SERPL-MCNC: 70 MG/DL (ref 40–60)
HGB BLD-MCNC: 13.2 G/DL (ref 12–15.9)
HGB UR QL STRIP: ABNORMAL
IMM GRANULOCYTES # BLD AUTO: 0.01 10*3/MM3 (ref 0–0.05)
IMM GRANULOCYTES NFR BLD AUTO: 0.2 % (ref 0–0.5)
KETONES UR QL STRIP: NEGATIVE
LDLC SERPL CALC-MCNC: 85 MG/DL (ref 0–100)
LEUKOCYTE ESTERASE UR QL STRIP: ABNORMAL
LYMPHOCYTES # BLD AUTO: 1.34 10*3/MM3 (ref 0.7–3.1)
LYMPHOCYTES NFR BLD AUTO: 25.6 % (ref 19.6–45.3)
MCH RBC QN AUTO: 30.3 PG (ref 26.6–33)
MCHC RBC AUTO-ENTMCNC: 33.2 G/DL (ref 31.5–35.7)
MCV RBC AUTO: 91.3 FL (ref 79–97)
MONOCYTES # BLD AUTO: 0.47 10*3/MM3 (ref 0.1–0.9)
MONOCYTES NFR BLD AUTO: 9 % (ref 5–12)
NEUTROPHILS # BLD AUTO: 3.17 10*3/MM3 (ref 1.7–7)
NEUTROPHILS NFR BLD AUTO: 60.7 % (ref 42.7–76)
NITRITE UR QL STRIP: NEGATIVE
NRBC BLD AUTO-RTO: 0 /100 WBC (ref 0–0.2)
PH UR STRIP: 6 [PH] (ref 5–8)
PLATELET # BLD AUTO: 302 10*3/MM3 (ref 140–450)
POTASSIUM SERPL-SCNC: 5.1 MMOL/L (ref 3.5–5.2)
PROT SERPL-MCNC: 6.7 G/DL (ref 6–8.5)
PROT UR QL STRIP: NEGATIVE
RBC # BLD AUTO: 4.35 10*6/MM3 (ref 3.77–5.28)
RBC #/AREA URNS HPF: ABNORMAL /HPF
SODIUM SERPL-SCNC: 141 MMOL/L (ref 136–145)
SP GR UR STRIP: 1.02 (ref 1–1.03)
TRIGL SERPL-MCNC: 42 MG/DL (ref 0–150)
TSH SERPL DL<=0.005 MIU/L-ACNC: 2.68 UIU/ML (ref 0.27–4.2)
UROBILINOGEN UR STRIP-MCNC: ABNORMAL MG/DL
VLDLC SERPL CALC-MCNC: 9 MG/DL (ref 5–40)
WBC # BLD AUTO: 5.23 10*3/MM3 (ref 3.4–10.8)
WBC #/AREA URNS HPF: ABNORMAL /HPF

## 2025-04-23 DIAGNOSIS — F90.0 ATTENTION DEFICIT HYPERACTIVITY DISORDER (ADHD), PREDOMINANTLY INATTENTIVE TYPE: ICD-10-CM

## 2025-04-23 RX ORDER — DEXTROAMPHETAMINE SACCHARATE, AMPHETAMINE ASPARTATE MONOHYDRATE, DEXTROAMPHETAMINE SULFATE AND AMPHETAMINE SULFATE 2.5; 2.5; 2.5; 2.5 MG/1; MG/1; MG/1; MG/1
10 CAPSULE, EXTENDED RELEASE ORAL EVERY MORNING
Qty: 30 CAPSULE | Refills: 0 | Status: SHIPPED | OUTPATIENT
Start: 2025-04-23

## 2025-04-23 RX ORDER — DEXTROAMPHETAMINE SACCHARATE, AMPHETAMINE ASPARTATE MONOHYDRATE, DEXTROAMPHETAMINE SULFATE AND AMPHETAMINE SULFATE 7.5; 7.5; 7.5; 7.5 MG/1; MG/1; MG/1; MG/1
30 CAPSULE, EXTENDED RELEASE ORAL EVERY MORNING
Qty: 30 CAPSULE | Refills: 0 | Status: SHIPPED | OUTPATIENT
Start: 2025-04-23

## 2025-04-25 ENCOUNTER — OFFICE VISIT (OUTPATIENT)
Dept: INTERNAL MEDICINE | Facility: CLINIC | Age: 65
End: 2025-04-25
Payer: MEDICARE

## 2025-04-25 VITALS
WEIGHT: 186 LBS | DIASTOLIC BLOOD PRESSURE: 76 MMHG | HEIGHT: 69 IN | HEART RATE: 77 BPM | SYSTOLIC BLOOD PRESSURE: 132 MMHG | BODY MASS INDEX: 27.55 KG/M2 | OXYGEN SATURATION: 98 %

## 2025-04-25 DIAGNOSIS — F90.0 ATTENTION DEFICIT HYPERACTIVITY DISORDER (ADHD), PREDOMINANTLY INATTENTIVE TYPE: ICD-10-CM

## 2025-04-25 DIAGNOSIS — G89.29 CHRONIC BILATERAL LOW BACK PAIN WITH SCIATICA, SCIATICA LATERALITY UNSPECIFIED: ICD-10-CM

## 2025-04-25 DIAGNOSIS — M54.42 CHRONIC BILATERAL LOW BACK PAIN WITH SCIATICA, SCIATICA LATERALITY UNSPECIFIED: ICD-10-CM

## 2025-04-25 DIAGNOSIS — M54.41 CHRONIC BILATERAL LOW BACK PAIN WITH SCIATICA, SCIATICA LATERALITY UNSPECIFIED: ICD-10-CM

## 2025-04-25 DIAGNOSIS — E78.5 HYPERLIPIDEMIA, UNSPECIFIED HYPERLIPIDEMIA TYPE: ICD-10-CM

## 2025-04-25 DIAGNOSIS — Z79.899 HIGH RISK MEDICATION USE: ICD-10-CM

## 2025-04-25 DIAGNOSIS — Z00.00 MEDICARE ANNUAL WELLNESS VISIT, SUBSEQUENT: Primary | ICD-10-CM

## 2025-04-25 NOTE — PROGRESS NOTES
Subjective   The ABCs of the Annual Wellness Visit  Medicare Wellness Visit      Angely Mathias is a 64 y.o. patient who presents for a Medicare Wellness Visit.    The following portions of the patient's history were reviewed and   updated as appropriate: allergies, current medications, past family history, past medical history, past social history, past surgical history, and problem list.    Compared to one year ago, the patient's physical   health is the same.  Compared to one year ago, the patient's mental   health is the same.    Recent Hospitalizations:  She was not admitted to the hospital during the last year.     Current Medical Providers:  Patient Care Team:  Cynthia Reid MD as PCP - General  Cynthia Reid MD as PCP - Family Medicine  Shaka Corrigan MD as Consulting Physician (Obstetrics and Gynecology)    Outpatient Medications Prior to Visit   Medication Sig Dispense Refill    amphetamine-dextroamphetamine XR (Adderall XR) 10 MG 24 hr capsule Take 1 capsule by mouth Every Morning 30 capsule 0    amphetamine-dextroamphetamine XR (Adderall XR) 30 MG 24 hr capsule Take 1 capsule by mouth Every Morning 30 capsule 0    atorvastatin (LIPITOR) 20 MG tablet Take 1 tablet by mouth once daily 90 tablet 0    Calcium Carbonate-Vitamin D (CALCIUM + D PO) Take by mouth.      estradiol (Vagifem) 10 MCG tablet vaginal tablet Insert 1 (one) tablet intravaginally twice weekly 24 tablet 3    Hydrocod Dioni-Chlorphe Dioni ER (TUSSIONEX PENNKINETIC) 10-8 MG/5ML ER suspension Take 5 mL by mouth Every 12 (Twelve) Hours As Needed for Cough. 118 mL 0    HYDROcodone-acetaminophen (NORCO)  MG per tablet Take 1 tablet by mouth Every 6 (Six) Hours As Needed for Moderate Pain. 60 tablet 0    tiZANidine (ZANAFLEX) 4 MG tablet Take 1 tablet by mouth Every 6 (Six) Hours As Needed for Muscle Spasms. 90 tablet 0    bimatoprost (Lumigan) 0.01 % ophthalmic drops Administer 1 drop to both eyes Every Night. 2.5 mL 6     "brimonidine (ALPHAGAN) 0.2 % ophthalmic solution Administer 1 drop to both eyes 2 (Two) Times a Day. 10 mL 6    dorzolamide-timolol (COSOPT) 22.3-6.8 MG/ML ophthalmic solution Administer 1 drop to the right eye 2 (Two) Times a Day. 10 mL 11    estradiol (MINIVELLE, VIVELLE-DOT) 0.05 MG/24HR patch Place 1 patch on the skin as directed by provider 2 (Two) Times a Week. 24 patch 0    Progesterone (PROMETRIUM) 200 MG capsule Take 1 (one) capsule by mouth once daily 90 capsule 3     No facility-administered medications prior to visit.     Opioid medication/s are on active medication list.  and I have evaluated her active treatment plan and pain score trends (see table).  Vitals:    04/25/25 1435   PainSc: 0-No pain     I have reviewed the chart for potential of high risk medication and harmful drug interactions in the elderly.        Aspirin is not on active medication list.  Aspirin use is not indicated based on review of current medical condition/s. Risk of harm outweighs potential benefits.  .    Patient Active Problem List   Diagnosis    Atopic rhinitis    Hyperlipidemia    Chronic low back pain    Neck pain    Attention deficit hyperactivity disorder (ADHD), predominantly inattentive type    Other specified glaucoma    Intervertebral disc disorder with radiculopathy of lumbar region    History of adenomatous polyp of colon    Cataracts, bilateral             Objective   Vitals:    04/25/25 1435   BP: 132/76   Pulse: 77   SpO2: 98%   Weight: 84.4 kg (186 lb)   Height: 175.3 cm (69.02\")   PainSc: 0-No pain       Estimated body mass index is 27.45 kg/m² as calculated from the following:    Height as of this encounter: 175.3 cm (69.02\").    Weight as of this encounter: 84.4 kg (186 lb).    BMI is >= 25 and <30. (Overweight) The following options were offered after discussion;: weight loss educational material (shared in after visit summary)        Does the patient have evidence of cognitive impairment? No  Lab Results "   Component Value Date    CHLPL 164 2025    TRIG 42 2025    HDL 70 (H) 2025    LDL 85 2025    VLDL 9 2025                                                                                                Health  Risk Assessment    Smoking Status:  Social History     Tobacco Use   Smoking Status Never   Smokeless Tobacco Never   Tobacco Comments    none     Alcohol Consumption:  Social History     Substance and Sexual Activity   Alcohol Use Yes    Alcohol/week: 4.0 standard drinks of alcohol    Types: 2 Glasses of wine, 2 Drinks containing 0.5 oz of alcohol per week    Comment: usually one or other on a weekend night       Fall Risk Screen  STEADI Fall Risk Assessment was completed, and patient is at LOW risk for falls.Assessment completed on:2025    Depression Screening   Little interest or pleasure in doing things? Not at all   Feeling down, depressed, or hopeless? Not at all   PHQ-2 Total Score 0      Health Habits and Functional and Cognitive Screenin/15/2025     8:54 AM   Functional & Cognitive Status   Do you have difficulty preparing food and eating? No   Do you have difficulty bathing yourself, getting dressed or grooming yourself? No   Do you have difficulty using the toilet? No   Do you have difficulty moving around from place to place? No   Do you have trouble with steps or getting out of a bed or a chair? No   Current Diet Other   Dental Exam Up to date   Eye Exam Up to date   Exercise (times per week) Other   Current Exercises Include Gardening;Yard Work   Do you need help using the phone?  No   Are you deaf or do you have serious difficulty hearing?  No   Do you need help to go to places out of walking distance? No   Do you need help shopping? No   Do you need help preparing meals?  No   Do you need help with housework?  No   Do you need help with laundry? No   Do you need help taking your medications? No   Do you need help managing money? No   Do you ever drive  or ride in a car without wearing a seat belt? No   Have you felt unusual stress, anger or loneliness in the last month? No   Who do you live with? Spouse   If you need help, do you have trouble finding someone available to you? No   Have you been bothered in the last four weeks by sexual problems? No   Do you have difficulty concentrating, remembering or making decisions? No           Age-appropriate Screening Schedule:  Refer to the list below for future screening recommendations based on patient's age, sex and/or medical conditions. Orders for these recommended tests are listed in the plan section. The patient has been provided with a written plan.    Health Maintenance List  Health Maintenance   Topic Date Due    DXA SCAN  11/20/2022    TDAP/TD VACCINES (2 - Td or Tdap) 01/24/2024    COVID-19 Vaccine (4 - 2024-25 season) 09/01/2024    ANNUAL WELLNESS VISIT  04/12/2025    INFLUENZA VACCINE  07/01/2025    LIPID PANEL  04/16/2026    MAMMOGRAM  06/04/2026    COLORECTAL CANCER SCREENING  05/09/2033    HEPATITIS C SCREENING  Completed    Pneumococcal Vaccine 50+  Completed    ZOSTER VACCINE  Completed                                                                                                                                                CMS Preventative Services Quick Reference  Risk Factors Identified During Encounter  None Identified    The above risks/problems have been discussed with the patient.  Pertinent information has been shared with the patient in the After Visit Summary.  An After Visit Summary and PPPS were made available to the patient.    Follow Up:   Next Medicare Wellness visit to be scheduled in 1 year.         Additional E&M Note during same encounter follows:  Patient has additional, significant, and separately identifiable condition(s)/problem(s) that require work above and beyond the Medicare Wellness Visit     Chief Complaint  Medicare Wellness-subsequent    Subjective   HPI  Angely is also  "being seen today for additional medical problem/s.        Overall labs look good.    HLD.  Good control.    ADD.   Well controlled.    Chronic low back pain.  Infrequent use of hydrocodone.          Objective   Vital Signs:  /76   Pulse 77   Ht 175.3 cm (69.02\")   Wt 84.4 kg (186 lb)   SpO2 98%   BMI 27.45 kg/m²   Physical Exam    The following data was reviewed by: Cynthia Reid MD on 04/25/2025:    Common labs          4/16/2025    09:41   Common Labs   Glucose 98    BUN 13    Creatinine 0.85    Sodium 141    Potassium 5.1    Chloride 105    Calcium 9.7    Albumin 4.4    Total Bilirubin 0.4    Alkaline Phosphatase 82    AST (SGOT) 23    ALT (SGPT) 17    WBC 5.23    Hemoglobin 13.2    Hematocrit 39.7    Platelets 302    Total Cholesterol 164    Triglycerides 42    HDL Cholesterol 70    LDL Cholesterol  85           Assessment and Plan Additional age appropriate preventative wellness advice topics were discussed during today's preventative wellness exam(some topics already addressed during AWV portion of the note above):    Physical Activity: Advised cardiovascular activity 150 minutes per week as tolerated. (example brisk walk for 30 minutes, 5 days a week).     Medicare annual wellness visit, subsequent         Hyperlipidemia, unspecified hyperlipidemia type            Attention deficit hyperactivity disorder (ADHD), predominantly inattentive type           High risk medication use    Orders:    [id number] 8+Oxycodone+Crt-Unbund - Urine, Clean Catch    HLD.  Control is good.  The patient is advised to continue current dosage of atorvastatin.    ADD.  Control is good.  The patient is advised to continue current dosage of Adderall.  Update contract.  Oniel is reviewed.    Chronic low back pain and neck pain.  Control is good.  Update contract for rare use of hydrocodone.       Reviewed and updated on 4/25/2025.    Follow Up   No follow-ups on file.  Patient was given instructions and counseling regarding " her condition or for health maintenance advice. Please see specific information pulled into the AVS if appropriate.

## 2025-04-25 NOTE — LETTER
Controlled Substance Prescribing Agreement          I, Angely Mathias [PATIENT],  1960 [] a patient of  Cynthia Reid MD   [PROVIDER] at [facility] [PRACTICE], have been informed that  individuals who are prescribed certain Controlled Substances including, but not limited to, narcotic pain medicines, stimulants, benzodiazepine tranquilizers, and barbiturate sedatives, can abuse those substances or may allow abuse by others, and have some risk of developing an addictive disorder or suffering a relapse of a prior addiction. Therefore, I have been informed that it is necessary to observe strict rules pertaining to their use, and I agree to follow the terms and procedures described in this Agreement as consideration for, and as a condition of, the willingness of the physician whose signature appears below to consider prescribing or to continue prescribing Controlled Substances to treat my pain.     1. I will inform my physician of any current or past substance abuse, or any current or past substance abuse of any immediate member of my immediate family.     2. I agree that I may be subject to a voluntary evaluation by psychologists and/or psychiatrists, possibly at my own expense, before any Controlled Substances will be prescribed to me. I agree that the need to be evaluated by psychologists and/or psychiatrists may be revisited every three (3) to six (6) months thereafter while taking the medication.     3. All Controlled Substances must come from a provider in the PROVIDER’S PRACTICE. My Controlled Substances will come from the PROVIDER whose signature appears below, or during his or her absence, by the covering provider, unless specific written authorization is obtained from the office for an exception.     4. I will obtain all Controlled Substances from the same pharmacy. Should the need arise to change pharmacies, I will inform the PROVIDER’S office.     5. I will inform the  PROVIDER’S office of any new medications or medical conditions, and of any adverse effects I experience from any of the medications that I take.     6. I will inform my other health care providers that I am taking the Controlled Substances listed above, and of the existence of this Agreement. In the event of an emergency, I will provide the foregoing information to emergency department providers.     7. I agree that my prescribing PROVIDER has permission to discuss all diagnostic and treatment details with other health care providers, pharmacists, or other professionals who provide my health care regarding my use of Controlled Substances for purposes of maintaining accountability.     8. I will not allow anyone else to have, use sell, or otherwise have access to these medications. The sharing of medications with anyone is absolutely forbidden and is against the law.         9. I understand that Controlled Substances may be hazardous or lethal to a person who is not tolerant to their effects, especially a child, and that I must keep them out of reach of such people for their own safety.     10. I understand that tampering with a written prescription is a felony and I will not change or tamper with the PROVIDER’S written prescription.     11. I am aware that attempting to obtain a Controlled Substance under false pretenses is illegal.     12. I agree not to alter my medication in any way, and I will take my medication whole, and it will not be broken, chewed, crushed, injected, or snorted.     13. I will take my medication as instructed and prescribed, and I will not exceed the maximum prescribed dose. Any change in dosage must be approved by the PROVIDER or a physician within the PRACTICE.     14. I understand that these drugs should not be stopped abruptly, as withdrawal syndromes may develop.     15. I will cooperate with unannounced urine or serum toxicology screenings as may be requested, as well as any random  pill counts of medication by the PROVIDER. Failure to comply may result in termination of the PROVIDER-patient relationship.     16. I understand that the presence of unauthorized and/or illegal substances in the screenings described in the paragraph above may prompt referral for assessment for a substance abuse disorder or termination of the PROVIDER-patient relationship.     17. I understand that medications may not be replaced if they are lost, damaged, or stolen. If any of these situations arise that cause me to request an early refill of my medication, a copy of a filed police report or a statement from me explaining the circumstances may be required before additional prescriptions are considered. If I request an early refill secondary to lost, damaged, or stolen prescriptions twice within a year, I may be discharged from the practice.     18. I understand that a prescription may be given early if the PROVIDER or the patient will be out of town when the refill is due. These prescriptions will contain instructions to the pharmacist that the prescriptions(s) may not be filled prior to the appropriate date.     19. If the responsible legal authorities have questions concerning my treatment, as may occur, for example, if I obtained medication at several pharmacies, all confidentiality is waived, and these authorities may be given full access to my full records of Controlled Substances administration.     20. I will keep my scheduled appointments in order to receive medication renewals. If I need to cancel my appointment, I will do so a minimum of twenty-four (24) hours before it is scheduled.     21. I understand that I may be asked to bring my medications in their original container to the PROVIDER’s office while I am on controlled medication.     22. Refills generally will not be given over the phone, after office hours, during the weekends, and on holidays.     23. I understand that any medical treatment is  initially a trial, with the goal of treatment being to improve the quality of life and ability to function and/or work. These parameters will be assessed periodically to determine the benefits of continued therapy, and continued prescription is contingent on whether my physician believes that the medication usage benefits me. I will comply with all treatments as outlined by the PROVIDER.     24. I have been explained the risks and potential benefits of these therapies, including, but not limited to, psychological addiction, physical dependence, withdrawal and over dosage.     25. I understand that failure to adhere to these policies and/or failure to comply with the PROVIDER’S treatment plan may result in cessation of therapy with Controlled Substance prescribing by the PROVIDER or referral for further specialty assessment, as well as possible discharge from the PRACTICE.     26. I, the undersigned patient, attest that the foregoing was discussed with me, and that I have read, fully understand, and agree to all of the above requirements and instructions. I affirm that I have the full right and power to sign and be bound by this  Agreement.         Date:  __________________________________________    Time:  __________________________________________    Patient Printed Name:  _____________________________    Patient Signature:  ________________________________           Date:  __________________________________________    Time:  __________________________________________    Provider Signature:  _______________________________

## 2025-04-28 PROBLEM — Z86.0101 HISTORY OF ADENOMATOUS POLYP OF COLON: Status: RESOLVED | Noted: 2022-05-13 | Resolved: 2025-04-28

## 2025-04-29 LAB
ACCEPTABLE CREAT UR QL: 43.9 MG/DL (ref 20–300)
AMPHET UR QL CFM: >3000 NG/ML
AMPHET UR QL CFM: POSITIVE
AMPHET+METHAMPHET UR-MCNC: POSITIVE NG/ML
AMPHETAMINES UR QL SCN: NORMAL NG/ML
BARBITURATES UR QL SCN: NEGATIVE NG/ML
BENZODIAZ UR QL SCN: NEGATIVE NG/ML
COCAINE UR QL SCN: NEGATIVE NG/ML
METHADONE UR QL SCN: NEGATIVE NG/ML
METHAMPHET UR QL CFM: NEGATIVE
OPIATES UR QL SCN: NEGATIVE NG/ML
OXYCODONE+OXYMORPHONE UR QL SCN: NEGATIVE NG/ML
PCP UR QL SCN: NEGATIVE NG/ML
PH UR: 5.3 [PH] (ref 4.5–8.9)
PROPOXYPH UR QL SCN: NEGATIVE NG/ML

## 2025-05-09 RX ORDER — ATORVASTATIN CALCIUM 20 MG/1
20 TABLET, FILM COATED ORAL DAILY
Qty: 90 TABLET | Refills: 0 | Status: SHIPPED | OUTPATIENT
Start: 2025-05-09

## 2025-05-30 DIAGNOSIS — F90.0 ATTENTION DEFICIT HYPERACTIVITY DISORDER (ADHD), PREDOMINANTLY INATTENTIVE TYPE: ICD-10-CM

## 2025-05-30 RX ORDER — DEXTROAMPHETAMINE SACCHARATE, AMPHETAMINE ASPARTATE MONOHYDRATE, DEXTROAMPHETAMINE SULFATE AND AMPHETAMINE SULFATE 7.5; 7.5; 7.5; 7.5 MG/1; MG/1; MG/1; MG/1
30 CAPSULE, EXTENDED RELEASE ORAL EVERY MORNING
Qty: 30 CAPSULE | Refills: 0 | Status: SHIPPED | OUTPATIENT
Start: 2025-05-30

## 2025-05-30 RX ORDER — DEXTROAMPHETAMINE SACCHARATE, AMPHETAMINE ASPARTATE MONOHYDRATE, DEXTROAMPHETAMINE SULFATE AND AMPHETAMINE SULFATE 2.5; 2.5; 2.5; 2.5 MG/1; MG/1; MG/1; MG/1
10 CAPSULE, EXTENDED RELEASE ORAL EVERY MORNING
Qty: 30 CAPSULE | Refills: 0 | Status: SHIPPED | OUTPATIENT
Start: 2025-05-30

## 2025-07-11 DIAGNOSIS — F90.0 ATTENTION DEFICIT HYPERACTIVITY DISORDER (ADHD), PREDOMINANTLY INATTENTIVE TYPE: ICD-10-CM

## 2025-07-11 RX ORDER — DEXTROAMPHETAMINE SACCHARATE, AMPHETAMINE ASPARTATE MONOHYDRATE, DEXTROAMPHETAMINE SULFATE AND AMPHETAMINE SULFATE 2.5; 2.5; 2.5; 2.5 MG/1; MG/1; MG/1; MG/1
10 CAPSULE, EXTENDED RELEASE ORAL EVERY MORNING
Qty: 30 CAPSULE | Refills: 0 | Status: SHIPPED | OUTPATIENT
Start: 2025-07-11

## 2025-07-11 RX ORDER — DEXTROAMPHETAMINE SACCHARATE, AMPHETAMINE ASPARTATE MONOHYDRATE, DEXTROAMPHETAMINE SULFATE AND AMPHETAMINE SULFATE 7.5; 7.5; 7.5; 7.5 MG/1; MG/1; MG/1; MG/1
30 CAPSULE, EXTENDED RELEASE ORAL EVERY MORNING
Qty: 30 CAPSULE | Refills: 0 | Status: SHIPPED | OUTPATIENT
Start: 2025-07-11

## 2025-08-04 RX ORDER — ATORVASTATIN CALCIUM 20 MG/1
20 TABLET, FILM COATED ORAL DAILY
Qty: 90 TABLET | Refills: 0 | Status: SHIPPED | OUTPATIENT
Start: 2025-08-04

## 2025-08-05 DIAGNOSIS — Z12.31 ENCOUNTER FOR SCREENING MAMMOGRAM FOR BREAST CANCER: Primary | ICD-10-CM

## 2025-08-05 DIAGNOSIS — Z78.0 MENOPAUSE: ICD-10-CM

## 2025-08-13 DIAGNOSIS — F90.0 ATTENTION DEFICIT HYPERACTIVITY DISORDER (ADHD), PREDOMINANTLY INATTENTIVE TYPE: ICD-10-CM

## 2025-08-13 RX ORDER — DEXTROAMPHETAMINE SACCHARATE, AMPHETAMINE ASPARTATE MONOHYDRATE, DEXTROAMPHETAMINE SULFATE AND AMPHETAMINE SULFATE 7.5; 7.5; 7.5; 7.5 MG/1; MG/1; MG/1; MG/1
30 CAPSULE, EXTENDED RELEASE ORAL EVERY MORNING
Qty: 30 CAPSULE | Refills: 0 | Status: SHIPPED | OUTPATIENT
Start: 2025-08-13

## 2025-08-13 RX ORDER — DEXTROAMPHETAMINE SACCHARATE, AMPHETAMINE ASPARTATE MONOHYDRATE, DEXTROAMPHETAMINE SULFATE AND AMPHETAMINE SULFATE 2.5; 2.5; 2.5; 2.5 MG/1; MG/1; MG/1; MG/1
10 CAPSULE, EXTENDED RELEASE ORAL EVERY MORNING
Qty: 30 CAPSULE | Refills: 0 | Status: SHIPPED | OUTPATIENT
Start: 2025-08-13

## 2025-08-21 ENCOUNTER — APPOINTMENT (OUTPATIENT)
Dept: WOMENS IMAGING | Facility: HOSPITAL | Age: 65
End: 2025-08-21
Payer: MEDICARE

## 2025-08-21 ENCOUNTER — HOSPITAL ENCOUNTER (OUTPATIENT)
Dept: PET IMAGING | Facility: HOSPITAL | Age: 65
Discharge: HOME | End: 2025-08-21
Payer: MEDICARE

## 2025-08-21 DIAGNOSIS — Z78.0 MENOPAUSE: ICD-10-CM

## 2025-08-21 PROCEDURE — 77080 DXA BONE DENSITY AXIAL: CPT

## 2025-08-27 DIAGNOSIS — Z13.6 ENCOUNTER FOR SCREENING FOR VASCULAR DISEASE: Primary | ICD-10-CM

## (undated) DEVICE — JACKT LAB F/R KNIT CUFF/COLR XLG BLU

## (undated) DEVICE — KT ORCA ORCAPOD DISP STRL

## (undated) DEVICE — GOWN ISOL W/THUMB UNIV BLU BX/15

## (undated) DEVICE — FLEX ADVANTAGE 1500CC: Brand: FLEX ADVANTAGE

## (undated) DEVICE — CANN NASL CO2 TRULINK W/O2 A/

## (undated) DEVICE — Device